# Patient Record
Sex: MALE | Race: WHITE | URBAN - METROPOLITAN AREA
[De-identification: names, ages, dates, MRNs, and addresses within clinical notes are randomized per-mention and may not be internally consistent; named-entity substitution may affect disease eponyms.]

---

## 2017-02-16 ENCOUNTER — TELEPHONE (OUTPATIENT)
Dept: DERMATOLOGY | Facility: CLINIC | Age: 47
End: 2017-02-16

## 2017-02-16 NOTE — TELEPHONE ENCOUNTER
History of a solid organ transplant:  no     Bone Marrow Transplant :  no    HIV or Hepatitis B or C :  no    Any Bleeding disorders:  no    Do you have a Pacemaker or Defibrillator: no    Do you wear a C Pap Mask: no  If yes and it is a face procedure ask Pt to bring mask only in with them    Do you have any mobility issues: no    Joint Replacement in the last 2 years:  no      Do you eder Prophylactic Antibiotics:  no  If yes patient will be instructed to take them one hour before they come to clinic    History of Skin cancer : yes    History of Mohs Surgery:  Yes      Smoking History:  Yes, 1/2 pack per day     Immunosuppresisve Medication:  no      Do you have any other health issues that we should know about?: no    Do you take ASA:  no IBU: as needed Vit. E.:  no Fish Oil:   noDo you take Plavix:  no  Warfarin:  no  Pradaxa:  No      Allergies to medication : NKDA    Are medications in Epic:  Yes     The patient was reminded that this can be an all day long appointment, to bring a  and his medications.

## 2017-02-27 ENCOUNTER — OFFICE VISIT (OUTPATIENT)
Dept: DERMATOLOGY | Facility: CLINIC | Age: 47
End: 2017-02-27

## 2017-02-27 VITALS — SYSTOLIC BLOOD PRESSURE: 120 MMHG | HEART RATE: 90 BPM | DIASTOLIC BLOOD PRESSURE: 76 MMHG

## 2017-02-27 DIAGNOSIS — C44.311 BASAL CELL CARCINOMA OF RIGHT SIDE OF NOSE: Primary | ICD-10-CM

## 2017-02-27 NOTE — MR AVS SNAPSHOT
After Visit Summary   2/27/2017    Valeriano Rendon    MRN: [id number]           Patient Information     Date Of Birth          1970        Visit Information        Provider Department      2/27/2017 8:30 AM Grace Mercado MD [facility] Dermatologic Surgery        Care Instructions    Sutured Wound Care  Caring for your skin after surgery:    After your surgery, a pressure bandage will be placed over the area that has stitches. This will prevent bleeding. Please follow these instructions over the next 1 to 2 weeks. Following this regimen will help to prevent complications as your wound heals.      No strenuous activity for 48 hours. Resume moderate activity in 48 hours. No heavy exercising until you are seen for follow up.    Take Tylenol one hour after surgery. Take Tylenol every 4 hours as needed and do not take any aspirin products for pain (continue taking aspirin if prescribed by your doctor to prevent heart attacks and strokes).    Do not drink alcoholic beverages for 48 hours.    No dietary restrictions.    Keep the pressure bandage in place for 24 hours. If the bandage becomes blood tinged or loose, reinforce it with gauze and tape.     Keep the bandage dry. Wash around it carefully    If the tape becomes soiled or starts to come off, reinforce it with additional paper tape.    Do not smoke for 3 weeks; smoking is detrimental to wound healing.    It is normal to have swelling and bruising around the surgical site. The bruising will fade in approximately 10-14 days. Elevate the area to reduce swelling.    Numbness, itchiness and sensitivity to temperature changes can occur after surgery and may take up to 18 months to normalize.  Remove the outer pressure bandage in 24 hours. Leave the flat bandage in place for 10 days or until your follow-up appointment if it is within those 10 days.     Bleeding:  You may see a small amount of drainage or blood on your pressure dressing. This  is normal and the following instructions should be followed if the wound is bleeding saturates the dressing.    1. Leave the bandage in place.  2. Use tightly rolled up gauze or a cloth to apply direct pressure over the bandage for 20 minutes.  3. Reapply pressure for an additional 20 minutes if necessary.  4. Call the office or go to the nearest emergency room if pressure fails to stop the bleeding.  5. Use additional gauze and tape to maintain pressure once the bleeding has stopped.    Pain:  1. Post operative pain should slowly get better, never worse.  2. A severe increase in pain may indicate a problem. Call the office if this occurs.  3. You may use ice directly over the dressing, but make sure the dressing does not get wet.    Phone numbers:  During business hours (M-F 8:00-4:30 p.m.)  [facility]-  [phone] Option 1 appt. desk  [phone]  Option 3 nurse triage line  ---------------------------------------------------------  Evenings/Weekends/Holidays  Hospital - [phone]   TTY for hearing gnybzzpb-[phone]  *Ask  to page dermatologist on-call  Emergency Qupu-[phone]  TTY for hearing impaired- [phone]          Follow-ups after your visit        Who to contact     Please call your clinic at [phone] to:    Ask questions about your health    Make or cancel appointments    Discuss your medicines    Learn about your test results    Speak to your doctor   If you have compliments or concerns about an experience at your clinic, or if you wish to file a complaint, please contact [facility] Patient Relations at [phone] or email us at [email or web address]         Additional Information About Your Visit        [employer] Information     [employer] is an electronic gateway that provides easy, online access to your medical records. With [employer], you can request a clinic appointment, read your test results, renew a  prescription or communicate with your care team.     To sign up for MyChart visit the website at [email or web address]   You will be asked to enter the access code listed below, as well as some personal information. Please follow the directions to create your username and password.     Your access code is: [email or web address]  Expires: 2017  6:31 AM     Your access code will  in 90 days. If you need help or a new code, please contact your [facility] or call [phone] for assistance.        Care EveryWhere ID     This is your Care EveryWhere ID. This could be used by other organizations to access your [facility] medical records  [id number]        Your Vitals Were     Pulse                   90            Blood Pressure from Last 3 Encounters:   17 120/76   16 106/72   16 116/72    Weight from Last 3 Encounters:   16 72.1 kg (159 lb)   08/10/16 72.2 kg (159 lb 3.2 oz)   16 72.2 kg (159 lb 3.2 oz)              Today, you had the following     No orders found for display       Primary Care Provider Office Phone # Fax #    Cosme Nando Parham [phone]       [facility] [address] [address]        Thank you!     Thank you for choosing [facility] DERMATOLOGIC SURGERY  for your care. Our goal is always to provide you with excellent care. Hearing back from our patients is one way we can continue to improve our services. Please take a few minutes to complete the written survey that you may receive in the mail after your visit with us. Thank you!             Your Updated Medication List - Protect others around you: Learn how to safely use, store and throw away your medicines at www.disposemymeds.org.      Notice  As of 2017 12:17 PM    You have not been prescribed any medications.

## 2017-02-27 NOTE — PATIENT INSTRUCTIONS
Sutured Wound Care  Caring for your skin after surgery:    After your surgery, a pressure bandage will be placed over the area that has stitches. This will prevent bleeding. Please follow these instructions over the next 1 to 2 weeks. Following this regimen will help to prevent complications as your wound heals.      No strenuous activity for 48 hours. Resume moderate activity in 48 hours. No heavy exercising until you are seen for follow up.    Take Tylenol one hour after surgery. Take Tylenol every 4 hours as needed and do not take any aspirin products for pain (continue taking aspirin if prescribed by your doctor to prevent heart attacks and strokes).    Do not drink alcoholic beverages for 48 hours.    No dietary restrictions.    Keep the pressure bandage in place for 24 hours. If the bandage becomes blood tinged or loose, reinforce it with gauze and tape.     Keep the bandage dry. Wash around it carefully    If the tape becomes soiled or starts to come off, reinforce it with additional paper tape.    Do not smoke for 3 weeks; smoking is detrimental to wound healing.    It is normal to have swelling and bruising around the surgical site. The bruising will fade in approximately 10-14 days. Elevate the area to reduce swelling.    Numbness, itchiness and sensitivity to temperature changes can occur after surgery and may take up to 18 months to normalize.  Remove the outer pressure bandage in 24 hours. Leave the flat bandage in place for 10 days or until your follow-up appointment if it is within those 10 days.     Bleeding:  You may see a small amount of drainage or blood on your pressure dressing. This is normal and the following instructions should be followed if the wound is bleeding saturates the dressing.    1. Leave the bandage in place.  2. Use tightly rolled up gauze or a cloth to apply direct pressure over the bandage for 20 minutes.  3. Reapply pressure for an additional 20 minutes if necessary.  4. Call  the office or go to the nearest emergency room if pressure fails to stop the bleeding.  5. Use additional gauze and tape to maintain pressure once the bleeding has stopped.    Pain:  1. Post operative pain should slowly get better, never worse.  2. A severe increase in pain may indicate a problem. Call the office if this occurs.  3. You may use ice directly over the dressing, but make sure the dressing does not get wet.    Phone numbers:  During business hours (M-F 8:00-4:30 p.m.)  [facility]-  [phone] Option 1 appt. desk  [phone]  Option 3 nurse triage line  ---------------------------------------------------------  Evenings/Weekends/Holidays  Hospital - [phone]   TTY for hearing haxgkswu-[phone]  *Ask  to page dermatologist on-call  Emergency Tjnj-[phone]  TTY for hearing impaired- [phone]

## 2017-02-27 NOTE — NURSING NOTE
Closure performed on the right sulcus. Injected 9.0ml of Xylocaine  (Lidocaine 1% and Epinephrine  (1:100,000)      Present for procedure: Dr. Mercado and Karoline Calzada, CMA

## 2017-02-27 NOTE — LETTER
2/27/2017       RE: Valeriano Rendon  [address] [facility] [address]     Dear Colleague,    Thank you for referring your patient, Valeriano Rendon, to the [facility] DERMATOLOGIC SURGERY at [facility]. Please see a copy of my visit note below.    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 Mohs surgery was indicated. After appropriate discussion and informed consent for Mohs surgery and possible repair of the Mohs surgery defect the patient underwent Mohs surgery using the fresh tissue technique as follows:    STAGE I:  The patient was placed on the operating room table.  The area was infiltrated with 1% Lidocaine and epinephrine. Using a #15-blade, complete excision was made around the tumor in 1 section.  Hemostasis was obtained by electrodesiccation.  A dressing was placed.  Tissue was divided into 1 tissue block that was subsequently mapped, color coded and processed in the Mohs Laboratory.  Microscopic tumor  was not found in the tissue block.    With the lesion clear of micrographic tumor, surgery was considered complete.  The defect extended to the subcutis and measured 0.8 cm x 0.8 cm.      RECONSTRUCTIVE DERMATOLOGIC SURGERY REPORT  We discussed the options for wound management in full with the patient including risks/benefits/possible outcomes.     Crescentic Advancement Flap  Because of the full-thickness nature of the defect, tightness of surrounding skin, and proximity to the nasal ala a crescentic advancement flap was carefully planned. Patient was prepped with  Betasept,  local anesthesia administered, and the site draped in a sterile fashion. The Mohs defect/wound was circumferentially debeveled. A Burow's triangle was then excised superior to the defect.  A tangential and curvilinear incision was made laterally onto the cheek paralleling the alar crease and nasolabial fold. Here a crescentic triangle was excised.  The laterally-based flap was then raised by dissection in the deep subcutaneous plane and the remainder of the wound edges were also undermined.  After hemostasis, the flap was advanced into the defect with periosteal sutures placed to sink the flap into the nasofacial sulcus. The flap was then sutured together in a layered fashion.   Postoperative size was 8.4 cm2.  Estimated blood loss, minimal; complications, none; bandaging and wound care, routine. Patient was discharged in good condition and will return for bandage change in 1 week.    Grace Mercado MD  , Department of Dermatology  Director, [facility]  Phone: [phone]; Fax: [phone]    [facility]   [address] [place] [address]   Mail Code [id number]   [place] [address]          [facility] Mohs Micrographic Surgery Note:  Feb 27, 2017  Valeriano Rendon is a 46 year old male who returns for Mohs excision of a BCC, nodular with fibrosis located on the right nasofacial sulcus. The lesion was excised using Mohs micrographic surgery in 1 stage(s), and the defect repaired by crescentic advancement flap.Please refer to the operative report(s).     Smoking cessation discussed.     SCAR FROM PRIOR MOHS, Left nasal sidewall  Additionally, scar from left nasofacial sulcus addressed. No spitting sutures were discovered on nicking of skin. Some comedones were expressed. Scar is firm. Discussed that resulting scar appearance is due to combination of factors, most notably smoking cigarettes, as well as  small post-op hematoma.       The patient was asked to return in 1 week(s) for bandage change/wound check.     Follow up with referring physician/provider for history of skin cancer.    Thank you for the opportunity to see and treat your patient. Please do not hesitate to contact me with any questions or concerns regarding the findings and/or operation.    Grace Mercado MD  , Department of Dermatology  Director, [facility]  Phone: [phone]; Fax: [phone]  [email or web address] Dermatologic Surgery [facility]   [address] [place] [address]   Mail Code [id number]   [place] [address]

## 2017-02-27 NOTE — PROGRESS NOTES
[facility] Mohs Micrographic Surgery Note:  Feb 27, 2017  Valeriano Rendon is a 46 year old male who returns for Mohs excision of a BCC, nodular with fibrosis located on the right nasofacial sulcus. The lesion was excised using Mohs micrographic surgery in 1 stage(s), and the defect repaired by crescentic advancement flap.Please refer to the operative report(s).     Smoking cessation discussed.     SCAR FROM PRIOR MOHS, Left nasal sidewall  Additionally, scar from left nasofacial sulcus addressed. No spitting sutures were discovered on nicking of skin. Some comedones were expressed. Scar is firm. Discussed that resulting scar appearance is due to combination of factors, most notably smoking cigarettes, as well as small post-op hematoma.       The patient was asked to return in 1 week(s) for bandage change/wound check.     Follow up with referring physician/provider for history of skin cancer.    Thank you for the opportunity to see and treat your patient. Please do not hesitate to contact me with any questions or concerns regarding the findings and/or operation.    Grace Mercado MD  , Department of Dermatology  Director, D[facility]  Phone: [phone]; Fax: [phone]  [email or web address]   [address] [place] [address]   Mail Code [id number] [address]

## 2017-02-27 NOTE — NURSING NOTE
1st layer performed on the right sulcus. Injected 3.0ml of Xylocaine  (Lidocaine 1% and Epinephrine  (1:100,000)      Present for procedure: Dr. Mercado and Karoline Calzada, CMA

## 2017-02-27 NOTE — NURSING NOTE
Dermatology Rooming Note    Valeriano Rendon's goals for this visit include:   Chief Complaint   Patient presents with     Skin Cancer     MOHS x1 left sulcus     Karoline Calzada, ALFONSO

## 2017-02-28 NOTE — PROGRESS NOTES
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 Mohs surgery was indicated. After appropriate discussion and informed consent for Mohs surgery and possible repair of the Mohs surgery defect the patient underwent Mohs surgery using the fresh tissue technique as follows:    STAGE I:  The patient was placed on the operating room table.  The area was infiltrated with 1% Lidocaine and epinephrine. Using a #15-blade, complete excision was made around the tumor in 1 section.  Hemostasis was obtained by electrodesiccation.  A dressing was placed.  Tissue was divided into 1 tissue block that was subsequently mapped, color coded and processed in the Mohs Laboratory.  Microscopic tumor  was not found in the tissue block.    With the lesion clear of micrographic tumor, surgery was considered complete.  The defect extended to the subcutis and measured 0.8 cm x 0.8 cm.      RECONSTRUCTIVE DERMATOLOGIC SURGERY REPORT  We discussed the options for wound management in full with the patient including risks/benefits/possible outcomes.     Crescentic Advancement Flap  Because of the full-thickness nature of the defect, tightness of surrounding skin, and proximity to the nasal ala a crescentic advancement flap was carefully planned. Patient was prepped with Betasept,  local anesthesia administered, and the site draped in a sterile fashion. The Mohs defect/wound was circumferentially debeveled. A Burow's triangle was then excised superior to the defect.  A tangential and curvilinear incision was made laterally onto the cheek paralleling the alar crease and  nasolabial fold. Here a crescentic triangle was excised.  The laterally-based flap was then raised by dissection in the deep subcutaneous plane and the remainder of the wound edges were also undermined.  After hemostasis, the flap was advanced into the defect with periosteal sutures placed to sink the flap into the nasofacial sulcus. The flap was then sutured together in a layered fashion.   Postoperative size was 8.4 cm2.  Estimated blood loss, minimal; complications, none; bandaging and wound care, routine. Patient was discharged in good condition and will return for bandage change in 1 week.    Grace Mercado MD  , Department of Dermatology  Director, [facility]  Phone: [phone]; Fax: [phone]    [facility]   [address] [place] [address]   Mail Code [id number]   [place] [address]

## 2017-03-03 ENCOUNTER — TELEPHONE (OUTPATIENT)
Dept: DERMATOLOGY | Facility: CLINIC | Age: 47
End: 2017-03-03

## 2017-03-03 NOTE — TELEPHONE ENCOUNTER
Spoke with Valeriano and discussed wound care instructions. He notes this area is not actively bleeding. He states that gauze and tape are not sticking to the area. I explained that Valeriano should remove the initial bandage (not the steri strips) and clean with water, pat dry and apply gauze and tape. Valeriano questioned if the steri strips came loose under his bandage, what would his next steps be? He informed me that he was sent home with steri strips and that his girlfriend is an RN. I explained that she could apply steri strips and we can recheck this wound on Wednesday. Valeriano was happy with the call back.

## 2017-03-03 NOTE — TELEPHONE ENCOUNTER
----- Message from Suni Elise sent at 3/3/2017  4:00 PM CST -----  Regarding: URGENT:  Pt had MOHS surgery on Monday with Dr. Mercado...  Contact: [phone]  He said that his dressing is so saturated that he cannot keep it on.  Can he come in yet today and get it clean up and changed?    Please call Pt at ph# [phone].    Thank you,    Natan    Please DO NOT send this message and/or reply back to sender.  Call Center Representatives DO NOT respond to messages.

## 2017-03-06 ENCOUNTER — ALLIED HEALTH/NURSE VISIT (OUTPATIENT)
Dept: DERMATOLOGY | Facility: CLINIC | Age: 47
End: 2017-03-06

## 2017-03-06 DIAGNOSIS — T81.40XA POSTOPERATIVE INFECTION, INITIAL ENCOUNTER: Primary | ICD-10-CM

## 2017-03-06 RX ORDER — CEPHALEXIN 500 MG/1
500 CAPSULE ORAL 3 TIMES DAILY
Qty: 30 CAPSULE | Refills: 0 | Status: SHIPPED | OUTPATIENT
Start: 2017-03-06 | End: 2017-05-18

## 2017-03-06 NOTE — MR AVS SNAPSHOT
After Visit Summary   3/6/2017    Valeriano Rendon    MRN: [id number]           Patient Information     Date Of Birth          1970        Visit Information        Provider Department      3/6/2017 10:30 AM Nurse, Caroline Dowling [facility] Dermatologic Surgery        Today's Diagnoses     Postoperative infection, initial encounter    -  1      Care Instructions    Wound care instructions for  ONE WEEK AFTER SURGERY      1. If the wound is open or bleeding in any part of the incision/graft site, you should cover the area with a bandage daily as directed below:    1. Clean and dry area with plain water using a Q-tip or sterile gauze pad.  2. Apply vaseline, aquaphor, polysporin, or bacitracin ointment to the wound  3. Cover the wound with a band-aid or a sterile non-stick gauze pad and micropore paper tape.      Repeat the instructions above until wound is completely healed    If you notice that the scar is red and firm (after you remove the dressing) this is normal and will fade over time. It may take up to 6-12 months for this to occur.    Massaging the area helps the scar fade and soften quicker. Begin to massage the area one month after the dressings have been removed. Apply pressure directly and firmly over the scar with the fingertips and move in circular motions. You may massage up to 10 times daily, each time for 30 seconds.    It is normal for there to be a tender, pimple-like bump along the scar about 6-8 weeks after surgery. This sometimes happens as the scar matures and the stitches beneath begin to dissolve. Do not pick or squeeze. It will resolve in time. If an area of the wound does open and there appears to be drainage, you may apply one of the ointments listed in the above directions a few times every day until the wound is completely healed.    Numbness around the surgical site is normal. It can take up to 12-18 months for the feeling to return to normal. Itchiness, tingling,  and occasional sharp pains might be present during this portion of the healing. All of these feelings will subside once the nerves have completely healed.      Phone numbers:  During business hours (M-F 8:00-4:30 p.m.)  [facility]-  [phone] Option 1 appt. desk  [phone]  Option 3 nurse triage line  ---------------------------------------------------------  Evenings/Weekends/Holidays  Hospital - [phone]   TTY for hearing [email or web address]-[phone]  *Ask  to page dermatologist on-call  Emergency Tisr-[phone]  TTY for hearing impaired- [phone]            Follow-ups after your visit        Who to contact     Please call your clinic at [phone] to:    Ask questions about your health    Make or cancel appointments    Discuss your medicines    Learn about your test results    Speak to your doctor   If you have compliments or concerns about an experience at your clinic, or if you wish to file a complaint, please contact [facility] Patient Relations at [phone] or email us at [email or web address]         Additional Information About Your Visit        [employer] Information     [employer] is an electronic gateway that provides easy, online access to your medical records. With [employer], you can request a clinic appointment, read your test results, renew a prescription or communicate with your care team.     To sign up for [employer] visit the website at [email or web address]   You will be asked to enter the access code listed below, as well as some personal information. Please follow the directions to create your username and password.     Your access code is: [email or web address]  Expires: 2017  6:31 AM     Your access code will  in 90 days. If you need help or a new code, please contact your [facility] or call [phone] for assistance.        Care EveryWhere ID     This is your Care  EveryWhere ID. This could be used by other organizations to access your [place] medical records  [id number]         Blood Pressure from Last 3 Encounters:   02/27/17 120/76   11/28/16 106/72   08/24/16 116/72    Weight from Last 3 Encounters:   08/24/16 72.1 kg (159 lb)   08/10/16 72.2 kg (159 lb 3.2 oz)   05/09/16 72.2 kg (159 lb 3.2 oz)              We Performed the Following     Skin Culture Aerobic Bacterial          Today's Medication Changes          These changes are accurate as of: 3/6/17 11:11 AM.  If you have any questions, ask your nurse or doctor.               Start taking these medicines.        Dose/Directions    cephALEXin 500 MG capsule   Commonly known as:  KEFLEX   Used for:  Postoperative infection, initial encounter        Dose:  500 mg   Take 1 capsule (500 mg) by mouth 3 times daily   Quantity:  30 capsule   Refills:  0            Where to get your medicines      These medications were sent to [address] [place] [address] [address] [place] [address]    Hours:  TRANSPLANT PHONE NUMBER [phone] Phone:  [phone]     cephALEXin 500 MG capsule                Primary Care Provider Office Phone # Fax #    Cosme Nando Parham [phone]       [facility] [address] [facility] [address]  [facility] [address]        Thank you!     Thank you for choosing [facility] DERMATOLOGIC SURGERY  for your care. Our goal is always to provide you with excellent care. Hearing back from our patients is one way we can continue to improve our services. Please take a few minutes to complete the written survey that you may receive in the mail after your visit with us. Thank you!             Your Updated Medication List - Protect others around you: Learn how to safely use, store and throw away your medicines at www.disposemymeds.org.          This list is accurate as of: 3/6/17 11:11 AM.  Always use your most recent  med list.                   Brand Name Dispense Instructions for use    cephALEXin 500 MG capsule    KEFLEX    30 capsule    Take 1 capsule (500 mg) by mouth 3 times daily

## 2017-03-06 NOTE — PATIENT INSTRUCTIONS
Wound care instructions for  ONE WEEK AFTER SURGERY      1. If the wound is open or bleeding in any part of the incision/graft site, you should cover the area with a bandage daily as directed below:    1. Clean and dry area with plain water using a Q-tip or sterile gauze pad.  2. Apply vaseline, aquaphor, polysporin, or bacitracin ointment to the wound  3. Cover the wound with a band-aid or a sterile non-stick gauze pad and micropore paper tape.      Repeat the instructions above until wound is completely healed    If you notice that the scar is red and firm (after you remove the dressing) this is normal and will fade over time. It may take up to 6-12 months for this to occur.    Massaging the area helps the scar fade and soften quicker. Begin to massage the area one month after the dressings have been removed. Apply pressure directly and firmly over the scar with the fingertips and move in circular motions. You may massage up to 10 times daily, each time for 30 seconds.    It is normal for there to be a tender, pimple-like bump along the scar about 6-8 weeks after surgery. This sometimes happens as the scar matures and the stitches beneath begin to dissolve. Do not pick or squeeze. It will resolve in time. If an area of the wound does open and there appears to be drainage, you may apply one of the ointments listed in the above directions a few times every day until the wound is completely healed.    Numbness around the surgical site is normal. It can take up to 12-18 months for the feeling to return to normal. Itchiness, tingling, and occasional sharp pains might be present during this portion of the healing. All of these feelings will subside once the nerves have completely healed.      Phone numbers:  During business hours (M-F 8:00-4:30 p.m.)  [facility]-  [phone] Option 1 appt. desk  [phone]  Option 3 nurse triage  line  ---------------------------------------------------------  Evenings/Weekends/Holidays  Hospital - [phone]   TTY for hearing ukxbovsa-[phone]  *Ask  to page dermatologist on-call  Emergency Jbzj-[phone]  TTY for hearing impaired- [phone]

## 2017-03-06 NOTE — NURSING NOTE
"Chief Complaint   Patient presents with     Follow Up For     Mohs F/U one week BCC right side of nose      Shea Sidhu RN       Erythema,swelling, purulent drainage noted. Md Mercado aware. Abx Rx given. Aerobic bacterial culture taken of site. Denies pain at this time. Patient continues to smoke. He reports \"cutting down 50%:. Granulating wound care instructions given. All questions answered.     Shea Sidhu RN     "

## 2017-03-08 LAB
BACTERIA SPEC CULT: NORMAL
Lab: NORMAL
MICRO REPORT STATUS: NORMAL
SPECIMEN SOURCE: NORMAL

## 2017-03-09 ENCOUNTER — TELEPHONE (OUTPATIENT)
Dept: DERMATOLOGY | Facility: CLINIC | Age: 47
End: 2017-03-09

## 2017-03-09 NOTE — TELEPHONE ENCOUNTER
"I called the patient and he was given the following message per Dr. Mercado: \"OK to continue antibiotics - can help decrease inflammation/redness. As long as he is not experiencing side effects, best to complete a course once it has been started.     Thanks!\"  The patient verbalized that he understood.   Karoline Calzada [facility]     "

## 2017-05-18 ENCOUNTER — RADIANT APPOINTMENT (OUTPATIENT)
Dept: GENERAL RADIOLOGY | Facility: CLINIC | Age: 47
End: 2017-05-18
Attending: PHYSICIAN ASSISTANT
Payer: OTHER MISCELLANEOUS

## 2017-05-18 ENCOUNTER — TELEPHONE (OUTPATIENT)
Dept: FAMILY MEDICINE | Facility: CLINIC | Age: 47
End: 2017-05-18

## 2017-05-18 ENCOUNTER — OFFICE VISIT (OUTPATIENT)
Dept: URGENT CARE | Facility: URGENT CARE | Age: 47
End: 2017-05-18
Payer: OTHER MISCELLANEOUS

## 2017-05-18 VITALS
WEIGHT: 163 LBS | HEART RATE: 82 BPM | OXYGEN SATURATION: 97 % | DIASTOLIC BLOOD PRESSURE: 84 MMHG | SYSTOLIC BLOOD PRESSURE: 118 MMHG | BODY MASS INDEX: 27.12 KG/M2 | TEMPERATURE: 98 F

## 2017-05-18 DIAGNOSIS — M54.50 ACUTE BILATERAL LOW BACK PAIN WITHOUT SCIATICA: Primary | ICD-10-CM

## 2017-05-18 DIAGNOSIS — M62.830 BACK MUSCLE SPASM: ICD-10-CM

## 2017-05-18 DIAGNOSIS — M54.50 ACUTE BILATERAL LOW BACK PAIN WITHOUT SCIATICA: ICD-10-CM

## 2017-05-18 DIAGNOSIS — S39.92XA BACK INJURY, INITIAL ENCOUNTER: Primary | ICD-10-CM

## 2017-05-18 PROCEDURE — 72100 X-RAY EXAM L-S SPINE 2/3 VWS: CPT

## 2017-05-18 PROCEDURE — 99214 OFFICE O/P EST MOD 30 MIN: CPT | Performed by: PHYSICIAN ASSISTANT

## 2017-05-18 RX ORDER — IBUPROFEN 800 MG/1
800 TABLET, FILM COATED ORAL EVERY 8 HOURS PRN
Qty: 30 TABLET | Refills: 1 | Status: SHIPPED | OUTPATIENT
Start: 2017-05-18

## 2017-05-18 RX ORDER — METHOCARBAMOL 500 MG/1
1000 TABLET, FILM COATED ORAL 3 TIMES DAILY PRN
Qty: 30 TABLET | Refills: 1 | Status: SHIPPED | OUTPATIENT
Start: 2017-05-18

## 2017-05-18 ASSESSMENT — PAIN SCALES - GENERAL: PAINLEVEL: SEVERE PAIN (6)

## 2017-05-18 NOTE — LETTER
[address] [address]  [phone]        May 18, 2017    REPORT OF WORK ABILITY    PATIENT DATA  Employee Name: Valeriano Rendon        : 1970   [id number]  Work related injury: YES  Today's date: May 18, 2017  Date of injury: 2017     PROVIDER EVALUATION: Please fill in as needed.  Please give copy to employee for employer.  1. Diagnosis: lower back pain, spasms  2. Treatment: ice, rest, medication  3. Medication: robaxin, motrin  NOTE: When ordering a medication, [place] Rules require Work Comp or WC on prescriptions.  4. Return to work date: 17      RESTRICTIONS:  No lifting below the knee  No lifting over 20 lbs  No working at heights  No driving if taking muscle relaxant  Pushing and pulling limitations over 50 lbs    Provider comments:   Medical Examiner: Jeff Grier  [facility] PAC      Next appointment: As needed   Work comp restrictions  17.    CC: Employer, Managed Care Plan/Payor, Patient

## 2017-05-18 NOTE — MR AVS SNAPSHOT
"              After Visit Summary   5/18/2017    Valeriano Rendon    MRN: [id number]           Patient Information     Date Of Birth          1970        Visit Information        Provider Department      5/18/2017 2:05 PM Jeff Grier PA-C [facility]        Today's Diagnoses     Acute bilateral low back pain without sciatica    -  1    Back muscle spasm           Follow-ups after your visit        Your next 10 appointments already scheduled     May 22, 2017  2:00 PM CDT   Office Visit with Cosme Parham MD   [facility] ([facility])    [address] [place] [address] [phone]           Bring a current list of meds and any records pertaining to this visit.  For Physicals, please bring immunization records and any forms needing to be filled out.  Please arrive 10 minutes early to complete paperwork.              Who to contact     If you have questions or need follow up information about today's clinic visit or your schedule please contact [facility] directly at [phone].  Normal or non-critical lab and imaging results will be communicated to you by [employer], letter or phone within 4 business days after the clinic has received the results. If you do not hear from us within 7 days, please contact the clinic through FAD ? IOt or phone. If you have a critical or abnormal lab result, we will notify you by phone as soon as possible.  Submit refill requests through [email or web address] or call your pharmacy and they will forward the refill request to us. Please allow 3 business days for your refill to be completed.          Additional Information About Your Visit        MyChart Information     [email or web address] lets you send messages to your doctor, view your test results, renew your prescriptions, schedule appointments and more. To sign up, go to [email or web address] . Click on \"Log in\" on the " "left side of the screen, which will take you to the Welcome page. Then click on \"Sign up Now\" on the right side of the page.     You will be asked to enter the access code listed below, as well as some personal information. Please follow the directions to create your username and password.     Your access code is: [email or web address]  Expires: 2017 12:39 PM     Your access code will  in 90 days. If you need help or a new code, please call your [facility] or [phone].        Care EveryWhere ID     This is your Care EveryWhere ID. This could be used by other organizations to access your [place] medical records  [id number]        Your Vitals Were     Pulse Temperature Pulse Oximetry BMI (Body Mass Index)          82 98  F (36.7  C) (Oral) 97% 27.12 kg/m2         Blood Pressure from Last 3 Encounters:   17 118/84   17 120/76   16 106/72    Weight from Last 3 Encounters:   17 163 lb (73.9 kg)   16 159 lb (72.1 kg)   08/10/16 159 lb 3.2 oz (72.2 kg)                 Today's Medication Changes          These changes are accurate as of: 17 11:59 PM.  If you have any questions, ask your nurse or doctor.               Start taking these medicines.        Dose/Directions    ibuprofen 800 MG tablet   Commonly known as:  ADVIL/MOTRIN   Used for:  Acute bilateral low back pain without sciatica   Started by:  Jeff Grier PA-C        Dose:  800 mg   Take 1 tablet (800 mg) by mouth every 8 hours as needed for moderate pain   Quantity:  30 tablet   Refills:  1       methocarbamol 500 MG tablet   Commonly known as:  ROBAXIN   Used for:  Back muscle spasm   Started by:  Jeff Grier PA-C        Dose:  1000 mg   Take 2 tablets (1,000 mg) by mouth 3 times daily as needed for muscle spasms   Quantity:  30 tablet   Refills:  1            Where to get your medicines      Some of these will need a paper prescription and others can be bought over the counter.  Ask your nurse if you " have questions.     Bring a paper prescription for each of these medications     ibuprofen 800 MG tablet    methocarbamol 500 MG tablet                Primary Care Provider Office Phone # Fax #    Cosme Parham [phone]       [facility] [address] [facility] [address]  [facility] [address]        Thank you!     Thank you for choosing [facility]  for your care. Our goal is always to provide you with excellent care. Hearing back from our patients is one way we can continue to improve our services. Please take a few minutes to complete the written survey that you may receive in the mail after your visit with us. Thank you!             Your Updated Medication List - Protect others around you: Learn how to safely use, store and throw away your medicines at www.disposemymeds.org.          This list is accurate as of: 5/18/17 11:59 PM.  Always use your most recent med list.                   Brand Name Dispense Instructions for use    ibuprofen 800 MG tablet    ADVIL/MOTRIN    30 tablet    Take 1 tablet (800 mg) by mouth every 8 hours as needed for moderate pain       methocarbamol 500 MG tablet    ROBAXIN    30 tablet    Take 2 tablets (1,000 mg) by mouth 3 times daily as needed for muscle spasms

## 2017-05-18 NOTE — TELEPHONE ENCOUNTER
Pt is requesting a referral for a back specialist due to a workman's comp issue    Pt can be reached @ [phone] penny

## 2017-05-18 NOTE — NURSING NOTE
"Chief Complaint   Patient presents with     Work Comp     back and shoulder injury. DOI: 05/17/2017 at 11:30p. Patient was lifting a door on a truck        Initial /84 (BP Location: Right arm, Patient Position: Chair, Cuff Size: Adult Regular)  Pulse 82  Temp 98  F (36.7  C) (Oral)  Wt 163 lb (73.9 kg)  SpO2 97%  BMI 27.12 kg/m2 Estimated body mass index is 27.12 kg/(m^2) as calculated from the following:    Height as of 8/24/16: 5' 5\" (1.651 m).    Weight as of this encounter: 163 lb (73.9 kg).  Medication Reconciliation: complete  "

## 2017-05-18 NOTE — TELEPHONE ENCOUNTER
Call to patient. Informed him of provider message below.     He has scheduled an appointment with Dr. Parham on 5/22/17 at 1400.     Chelo Price RN  [facility]

## 2017-05-18 NOTE — TELEPHONE ENCOUNTER
Dr. 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     He has not taken anything for pain, reports that it is tolerable but causing discomfort. Asked patient to schedule an appointment, he requests a message be sent to MD.    He is requesting a referral to a back specialist    Triage: please call patient back on his cell number [phone]    Chelo Price RN  [facility]

## 2017-05-18 NOTE — TELEPHONE ENCOUNTER
I can refer him to an orthopedic/ spine clinic BUT he needs to first notify with WORKMANS COMP person and find out what their requirements are

## 2017-05-19 NOTE — PROGRESS NOTES
SUBJECTIVE  HPI: Valeriano Rendon is a 46 year old male who presents for evaluation of back pain lower and mild upper back  Symptoms began 1 day(s) ago, have been onset gradual and are worse.  Pain is located in the low back bilateral, middle of back bilateral and upper back bilateral region, with radiation to does not radiate, and are at worst a 5 on a scale of 1-10.  Recent injury:recent injury at work  , recent heavy lifting and turning/bending   Personal hx of back pain is recurrent self limited episodes of low back pain in the past.  Pain is exacerbated by: bending, changing position and lifting.  Pain is relieved by: none   Associated sx include: spasms.  Red flag symptoms: negative, fever, chills, night sweats.    PMH:  None    Allergies   Allergen Reactions     Vicodin [Hydrocodone-Acetaminophen] GI Disturbance     Social History   Substance Use Topics     Smoking status: Current Every Day Smoker     Smokeless tobacco: Never Used     Alcohol use No       ROS:  CONSTITUTIONAL:NEGATIVE for fever, chills, change in weight  INTEGUMENTARY/SKIN: NEGATIVE for worrisome rashes, moles or lesions  ENT/MOUTH: NEGATIVE for ear, mouth and throat problems  RESP:NEGATIVE for significant cough or SOB  CV: NEGATIVE for chest pain, palpitations or peripheral edema  GI: NEGATIVE for nausea, abdominal pain, heartburn, or change in bowel habits  MUSCULOSKELETAL: POSITIVE  for lower back, mid back and upper back pain with spasms  NEURO: NEGATIVE for weakness, dizziness or paresthesias    OBJECTIVE:  /84 (BP Location: Right arm, Patient Position: Chair, Cuff Size: Adult Regular)  Pulse 82  Temp 98  F (36.7  C) (Oral)  Wt 163 lb (73.9 kg)  SpO2 97%  BMI 27.12 kg/m2  Back examination: positive findings: paraspinal muscle spasm, tenderness to palpation lower lumbar region, mid and upper back laterally  STRAIGHT leg raise test: negative  GENERAL APPEARANCE: healthy, alert and no distress  RESP: lungs clear to  auscultation - no rales, rhonchi or wheezes  CV: regular rates and rhythm, normal S1 S2, no murmur noted  ABDOMEN:  soft, nontender, no HSM or masses and bowel sounds normal  NEURO: Normal strength and tone with no weakness or sensory deficit noted, reflexes normal   Extremities: no peripheral edema or tenderness, peripheral pulses normal  SKIN: no suspicious lesions or rashes    Lumbar xray Negative for acute findings, read by Jeff ESPINOSA at time of visit.    ASSESSMENT/IMPRESSION/PLAN:      ICD-10-CM    1. Acute bilateral low back pain without sciatica M54.5 XR Lumbar Spine 2/3 Views     ibuprofen (ADVIL/MOTRIN) 800 MG tablet   2. Back muscle spasm M62.830 methocarbamol (ROBAXIN) 500 MG tablet     EDUCATION      1.  Continue stretching and strengthening exercises.       2.  Continue prn heat or ice application.      Alternate ice and warm moist compresses  Follow up as needed

## 2017-05-22 ENCOUNTER — OFFICE VISIT (OUTPATIENT)
Dept: FAMILY MEDICINE | Facility: CLINIC | Age: 47
End: 2017-05-22
Payer: OTHER MISCELLANEOUS

## 2017-05-22 VITALS
SYSTOLIC BLOOD PRESSURE: 120 MMHG | DIASTOLIC BLOOD PRESSURE: 74 MMHG | HEART RATE: 85 BPM | WEIGHT: 163.1 LBS | HEIGHT: 65 IN | OXYGEN SATURATION: 100 % | BODY MASS INDEX: 27.17 KG/M2 | TEMPERATURE: 97.3 F

## 2017-05-22 DIAGNOSIS — M54.50 BILATERAL LOW BACK PAIN WITHOUT SCIATICA, UNSPECIFIED CHRONICITY: Primary | ICD-10-CM

## 2017-05-22 DIAGNOSIS — F34.1 DYSTHYMIC DISORDER: ICD-10-CM

## 2017-05-22 PROCEDURE — 99213 OFFICE O/P EST LOW 20 MIN: CPT | Performed by: FAMILY MEDICINE

## 2017-05-22 NOTE — PROGRESS NOTES
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   Symptoms have been improving since that time. Prior history of back problems: recurrent self limited episodes of low back pain melissa the right  in the past. There is no numbness in the legs.    OBJECTIVE:  Vital signs as noted above. Patient appears to be in mild   pain, normal  gait noted. Lumbosacral spine area reveals mild lower LS local tenderness but no  Painful but normal  LS ROM noted. Straight leg raise is negative  on both sides. DTR's, motor strength and sensation normal, including heel and toe gait.  Peripheral pulses are palpable.     ASSESSMENT:   lumbar strain , mostly resolved on the left with still some slight tendernes son the right     PLAN:  For acute pain, rest, intermittent application of cold packs (later, may switch to heat, but do not sleep on heating pad), analgesics and muscle relaxants are recommended. Discussed longer term treatment plan of prn NSAID's and discussed a home back care exercise program with flexion exercise routine. Proper lifting with avoidance of heavy lifting discussed. Will try r Physical Therapy and " consider XRay studies if not improving. Call or return to clinic prn if these symptoms worsen or fail to improve as anticipated.    Orders Placed This Encounter     [facility]     Referral Type:   [facility]

## 2017-05-22 NOTE — PROGRESS NOTES
"Chief Complaint   Patient presents with     Back Pain       Initial /74  Pulse 85  Temp 97.3  F (36.3  C) (Oral)  Ht 5' 5\" (1.651 m)  Wt 163 lb 1.6 oz (74 kg)  SpO2 100%  BMI 27.14 kg/m2 Estimated body mass index is 27.14 kg/(m^2) as calculated from the following:    Height as of this encounter: 5' 5\" (1.651 m).    Weight as of this encounter: 163 lb 1.6 oz (74 kg).  Medication Reconciliation: complete     Jessi Jameson MA    \  "

## 2017-05-22 NOTE — MR AVS SNAPSHOT
After Visit Summary   5/22/2017    Valeriano Rendon    MRN: [id number]           Patient Information     Date Of Birth          1970        Visit Information        Provider Department      5/22/2017 2:00 PM [place]ham [place] [facility] [place]        Today's Diagnoses     Bilateral low back pain without sciatica, unspecified chronicity    -  1       Follow-ups after your visit        Additional Services     CONNIE PT, HAND, AND CHIROPRACTIC REFERRAL       **This order will print in the [facility] Scheduling Office**    Physical Therapy, Hand Therapy and Chiropractic Care are available through:    *[facility]  *[facility]  *[facility]    Call one number to schedule at any of the above locations: [phone].    Your provider has referred you to: Chiropractic at [facility] or [facility]    Indication/Reason for Referral: Bilateral low back pain without sciatica, unspecified chronicity  (primary encounter diagnosis)   Onset of Illness: 5/17/2017  Therapy Orders: Evaluate and Treat  Special Programs: None  Special Request: None    Alis Snell      Additional Comments for the Therapist or Chiropractor:     Please be aware that coverage of these services is subject to the terms and limitations of your health insurance plan.  Call member services at your health plan with any benefit or coverage questions.      Please bring the following to your appointment:    *Your personal calendar for scheduling future appointments  *Comfortable clothing                  Who to contact     If you have questions or need follow up information about today's clinic visit or your schedule please contact [facility] directly at [phone].  Normal or non-critical lab and imaging results will be communicated to you by MyChart, letter or phone within 4 business days after the clinic has received the results. If you do not hear from us within 7  "days, please contact the clinic through SightCine or phone. If you have a critical or abnormal lab result, we will notify you by phone as soon as possible.  Submit refill requests through SightCine or call your pharmacy and they will forward the refill request to us. Please allow 3 business days for your refill to be completed.          Additional Information About Your Visit        SightCine Information     SightCine lets you send messages to your doctor, view your test results, renew your prescriptions, schedule appointments and more. To sign up, go to [email or web address] . Click on \"Log in\" on the left side of the screen, which will take you to the Welcome page. Then click on \"Sign up Now\" on the right side of the page.     You will be asked to enter the access code listed below, as well as some personal information. Please follow the directions to create your username and password.     Your access code is: [email or web address]  Expires: 2017 12:39 PM     Your access code will  in 90 days. If you need help or a new code, please call your [place] clinic or [phone].        Care EveryWhere ID     This is your Care EveryWhere ID. This could be used by other organizations to access your [place] medical records  [id number]        Your Vitals Were     Pulse Temperature Height Pulse Oximetry BMI (Body Mass Index)       85 97.3  F (36.3  C) (Oral) 5' 5\" (1.651 m) 100% 27.14 kg/m2        Blood Pressure from Last 3 Encounters:   17 120/74   17 118/84   17 120/76    Weight from Last 3 Encounters:   17 163 lb 1.6 oz (74 kg)   17 163 lb (73.9 kg)   16 159 lb (72.1 kg)              We Performed the Following     CONNIE PT, HAND, AND CHIROPRACTIC REFERRAL        Primary Care Provider Office Phone # Fax #    Cosme Parham [phone]       [facility] [address] [facility] [address]  [facility] [address]        Thank you!     Thank you for choosing [facility] [place]  for your care. Our goal is always to provide you with excellent care. Hearing back from our patients is one way we can continue to improve our services. Please take a few minutes to complete the written survey that you may receive in the mail after your visit with us. Thank you!             Your Updated Medication List - Protect others around you: Learn how to safely use, store and throw away your medicines at www.disposemymeds.org.          This list is accurate as of: 5/22/17  2:25 PM.  Always use your most recent med list.                   Brand Name Dispense Instructions for use    ibuprofen 800 MG tablet    ADVIL/MOTRIN    30 tablet    Take 1 tablet (800 mg) by mouth every 8 hours as needed for moderate pain       methocarbamol 500 MG tablet    ROBAXIN    30 tablet    Take 2 tablets (1,000 mg) by mouth 3 times daily as needed for muscle spasms

## 2017-06-02 ENCOUNTER — TELEPHONE (OUTPATIENT)
Dept: FAMILY MEDICINE | Facility: CLINIC | Age: 47
End: 2017-06-02

## 2017-06-02 DIAGNOSIS — M54.50 BILATERAL LOW BACK PAIN WITHOUT SCIATICA, UNSPECIFIED CHRONICITY: Primary | ICD-10-CM

## 2017-06-02 NOTE — TELEPHONE ENCOUNTER
MD message noted. No further action needed, closing encounter.     Chelo Price RN  [facility]

## 2017-06-02 NOTE — TELEPHONE ENCOUNTER
Call from Viktor Garcia . She reports she is unable to schedule PT for patient as referral is listed for chiropractic.     Dr. Parham,     Please review and sign cued referral for PT, if okay.     Thank you,   Chelo Price RN  [facility]

## 2017-06-02 NOTE — TELEPHONE ENCOUNTER
I called and spoke to Radha, this is the SAME referral that I  already did  He does not have to do chiro but it is an option

## 2017-06-02 NOTE — TELEPHONE ENCOUNTER
Radha is calling from [employer] who is handling the w/c for Valeriano and they had some questions regarding his therapy orders. She can be reached at [phone] and her extension is [phone].

## 2017-06-02 NOTE — TELEPHONE ENCOUNTER
Left VM for Radha from [employer] to return call to clinic with best way to get ahold of her    Moni Haas RN

## 2017-06-06 ENCOUNTER — THERAPY VISIT (OUTPATIENT)
Dept: PHYSICAL THERAPY | Facility: CLINIC | Age: 47
End: 2017-06-06
Payer: OTHER MISCELLANEOUS

## 2017-06-06 DIAGNOSIS — M54.50 BILATERAL LOW BACK PAIN WITHOUT SCIATICA: Primary | ICD-10-CM

## 2017-06-06 PROCEDURE — 97110 THERAPEUTIC EXERCISES: CPT | Mod: GP | Performed by: PHYSICAL THERAPIST

## 2017-06-06 PROCEDURE — 97161 PT EVAL LOW COMPLEX 20 MIN: CPT | Mod: GP | Performed by: PHYSICAL THERAPIST

## 2017-06-06 NOTE — PROGRESS NOTES
Subjective:    HPI                  Physical Therapy Initial Examination/Evaluation  June 6, 2017    Valeriano Rendon is a 46 year old male referred to physical therapy by Dr. 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  Low back pain lifting up the door to the back of the truck.  Pt saw MD the next day-musculoskeletal-ice and meds decreased the tightness and nodule.  Pt persists more on the right side vs left.  Subjective:  DOI/onset: 5/17/17   Acute Injury or Gradual Onset?: Acute injury onset  Mechanism of Injury: Lifting work injury  Related PMH: chronic low back pain Previous Treatment: NA   Imaging: none  Chief Complaint/Functional Limitations:   Pain with upright sitting and see below in therapy evaluation codes   Pain: rest 3 /10, activity 4/10 Location: LY lumbar region Frequency: Constant Described as: aching and dull Alleviated by: Nothing Progression of Symptoms: Unchanged-right side, left is better Time of day when pain is worse: Activity related  Sleeping: Interrupted due to current issue   Occupation: trunk   Job duties: prolonged sitting, repetitive tasks, lifting/carrying  Current HEP/exercise regimen: none  Patient's goals are see chief complaints return to prior level    Other pertinent PMH/Red Flags: smoking   Barriers at home/work: None as reported by patient  Pertinent Surgical History: none  Medications: Anti-inflammatory  General health as reported by patient: good  Return to MD:  none    No current work restrictions  Normal exercise is walking the dog.  Golf 2-3 times a week.  No numbness or tingling  Objective:    System         Lumbar/SI Evaluation  ROM:    AROM Lumbar:   Flexion:            100%  Ext:                    75% and pain   Side Bend:        Left:  100% and pain    Right:  100%  Rotation:           Left:  100%    Right:  100% and pain  Side Glide:         Left:     Right:           Lumbar Myotomes:  normal                    Lumbar Palpation:  Palpation (lumbar): TTP: right LS fascia.                                                         General     ROS  SI screen: negative   Assessment/Plan:      Patient is a 46 year old male with lumbar complaints.    Patient has the following significant findings with corresponding treatment plan.                Diagnosis 1:  Lumbar strain  Pain -  hot/cold therapy, US, manual therapy, self management, education and home program  Decreased strength - therapeutic exercise and therapeutic activities  Decreased function - therapeutic activities  Impaired posture - neuro re-education    Therapy Evaluation Codes:   1) History comprised of:   Personal factors that impact the plan of care:      None.    Comorbidity factors that impact the plan of care are:      None.     Medications impacting care: None.  2) Examination of Body Systems comprised of:   Body structures and functions that impact the plan of care:      Lumbar spine.   Activity limitations that impact the plan of care are:      Driving, Lifting and Sitting.  3) Clinical presentation characteristics are:   Stable/Uncomplicated.  4) Decision-Making    Low complexity using standardized patient assessment instrument and/or measureable assessment of functional outcome.  Cumulative Therapy Evaluation is: Low complexity.    Previous and current functional limitations:  (See Goal Flow Sheet for this information)    Short term and Long term goals: (See Goal Flow Sheet for this information)     Communication ability:  Patient appears to be able to clearly communicate and understand verbal and written communication and follow directions correctly.  Treatment Explanation - The following has been discussed with the patient:   RX ordered/plan of care  Anticipated outcomes  Possible risks and side effects  This patient would benefit from PT intervention to resume normal activities.    Rehab potential is good.    Frequency:  2 X week, once daily  Duration:  for 2 weeks tapering to 1 X a week over 6 weeks  Discharge Plan:  Achieve all LTG.  Independent in home treatment program.  Reach maximal therapeutic benefit.    Please refer to the daily flowsheet for treatment today, total treatment time and time spent performing 1:1 timed codes.

## 2017-06-06 NOTE — MR AVS SNAPSHOT
"              After Visit Summary   6/6/2017    Valeriano Rendon    MRN: [id number]           Patient Information     Date Of Birth          1970        Visit Information        Provider Department      6/6/2017 4:10 PM Pepe Bermudez, PT [facility]        Today's Diagnoses     Bilateral low back pain without sciatica    -  1       Follow-ups after your visit        Your next 10 appointments already scheduled     Jun 12, 2017  2:50 PM CDT   CONNIE Spine with Pepe Bermudez PT   [facility] ( [facility]    [address] [place] [address] [phone]            Jun 20, 2017  3:30 PM CDT   CONNIE Spine with Pepe Bermudez PT   [facility]    [address] [place] [address] [phone]              Who to contact     If you have questions or need follow up information about today's clinic visit or your schedule please contact [facility] directly at [phone].  Normal or non-critical lab and imaging results will be communicated to you by Mitrohart, letter or phone within 4 business days after the clinic has received the results. If you do not hear from us within 7 days, please contact the clinic through Stickyt or phone. If you have a critical or abnormal lab result, we will notify you by phone as soon as possible.  Submit refill requests through Ubertesters or call your pharmacy and they will forward the refill request to us. Please allow 3 business days for your refill to be completed.          Additional Information About Your Visit        Mitrohart Information     Ubertesters lets you send messages to your doctor, view your test results, renew your prescriptions, schedule appointments and more. To sign up, go to [email or web address] . Click on \"Log in\" on the left side of " "the screen, which will take you to the Welcome page. Then click on \"Sign up Now\" on the right side of the page.     You will be asked to enter the access code listed below, as well as some personal information. Please follow the directions to create your username and password.     Your access code is: [email or web address]  Expires: 2017 12:39 PM     Your access code will  in 90 days. If you need help or a new code, please call your [facility] or [phone].        Care EveryWhere ID     This is your Care EveryWhere ID. This could be used by other organizations to access your [facility] medical records  [id number]         Blood Pressure from Last 3 Encounters:   17 120/74   17 118/84   17 120/76    Weight from Last 3 Encounters:   17 74 kg (163 lb 1.6 oz)   17 73.9 kg (163 lb)   16 72.1 kg (159 lb)              We Performed the Following     CONNIE Inital Eval Report     PT Eval, Low Complexity (93235)     Therapeutic Exercises        Primary Care Provider Office Phone # Fax #    Cosme Nando Parham [phone]       [facility] [address] [address]        Thank you!     Thank you for choosing [facility]  for your care. Our goal is always to provide you with excellent care. Hearing back from our patients is one way we can continue to improve our services. Please take a few minutes to complete the written survey that you may receive in the mail after your visit with us. Thank you!             Your Updated Medication List - Protect others around you: Learn how to safely use, store and throw away your medicines at www.disposemymeds.org.          This list is accurate as of: 17  4:57 PM.  Always use your most recent med list.                   Brand Name Dispense Instructions for use    ibuprofen 800 MG tablet    ADVIL/MOTRIN    30 tablet    Take 1 tablet (800 mg) by mouth every 8 " hours as needed for moderate pain       methocarbamol 500 MG tablet    ROBAXIN    30 tablet    Take 2 tablets (1,000 mg) by mouth 3 times daily as needed for muscle spasms

## 2017-06-12 ENCOUNTER — THERAPY VISIT (OUTPATIENT)
Dept: PHYSICAL THERAPY | Facility: CLINIC | Age: 47
End: 2017-06-12
Payer: OTHER MISCELLANEOUS

## 2017-06-12 DIAGNOSIS — M54.50 BILATERAL LOW BACK PAIN WITHOUT SCIATICA: ICD-10-CM

## 2017-06-12 PROCEDURE — 97110 THERAPEUTIC EXERCISES: CPT | Mod: GP | Performed by: PHYSICAL THERAPIST

## 2017-06-12 PROCEDURE — 97140 MANUAL THERAPY 1/> REGIONS: CPT | Mod: GP | Performed by: PHYSICAL THERAPIST

## 2017-06-20 ENCOUNTER — THERAPY VISIT (OUTPATIENT)
Dept: PHYSICAL THERAPY | Facility: CLINIC | Age: 47
End: 2017-06-20
Payer: OTHER MISCELLANEOUS

## 2017-06-20 DIAGNOSIS — M54.50 BILATERAL LOW BACK PAIN WITHOUT SCIATICA: ICD-10-CM

## 2017-06-20 PROCEDURE — 97140 MANUAL THERAPY 1/> REGIONS: CPT | Mod: GP | Performed by: PHYSICAL THERAPIST

## 2017-06-20 PROCEDURE — 97110 THERAPEUTIC EXERCISES: CPT | Mod: GP | Performed by: PHYSICAL THERAPIST

## 2017-07-05 ENCOUNTER — THERAPY VISIT (OUTPATIENT)
Dept: PHYSICAL THERAPY | Facility: CLINIC | Age: 47
End: 2017-07-05
Payer: OTHER MISCELLANEOUS

## 2017-07-05 DIAGNOSIS — M54.50 BILATERAL LOW BACK PAIN WITHOUT SCIATICA: ICD-10-CM

## 2017-07-05 PROCEDURE — 97140 MANUAL THERAPY 1/> REGIONS: CPT | Mod: GP | Performed by: PHYSICAL THERAPIST

## 2017-07-05 PROCEDURE — 97110 THERAPEUTIC EXERCISES: CPT | Mod: GP | Performed by: PHYSICAL THERAPIST

## 2017-07-19 ENCOUNTER — THERAPY VISIT (OUTPATIENT)
Dept: PHYSICAL THERAPY | Facility: CLINIC | Age: 47
End: 2017-07-19
Payer: OTHER MISCELLANEOUS

## 2017-07-19 DIAGNOSIS — M54.50 BILATERAL LOW BACK PAIN WITHOUT SCIATICA: ICD-10-CM

## 2017-07-19 PROCEDURE — 97140 MANUAL THERAPY 1/> REGIONS: CPT | Mod: GP | Performed by: PHYSICAL THERAPIST

## 2017-07-19 PROCEDURE — 97110 THERAPEUTIC EXERCISES: CPT | Mod: GP | Performed by: PHYSICAL THERAPIST

## 2017-08-07 ENCOUNTER — OFFICE VISIT (OUTPATIENT)
Dept: FAMILY MEDICINE | Facility: CLINIC | Age: 47
End: 2017-08-07
Payer: OTHER MISCELLANEOUS

## 2017-08-07 VITALS
OXYGEN SATURATION: 97 % | DIASTOLIC BLOOD PRESSURE: 76 MMHG | HEART RATE: 85 BPM | TEMPERATURE: 97.6 F | BODY MASS INDEX: 26.66 KG/M2 | WEIGHT: 160.2 LBS | SYSTOLIC BLOOD PRESSURE: 120 MMHG

## 2017-08-07 DIAGNOSIS — G89.29 CHRONIC RIGHT-SIDED LOW BACK PAIN WITHOUT SCIATICA: Primary | ICD-10-CM

## 2017-08-07 DIAGNOSIS — M54.50 CHRONIC RIGHT-SIDED LOW BACK PAIN WITHOUT SCIATICA: Primary | ICD-10-CM

## 2017-08-07 PROCEDURE — 99213 OFFICE O/P EST LOW 20 MIN: CPT | Performed by: FAMILY MEDICINE

## 2017-08-07 NOTE — MR AVS SNAPSHOT
"              After Visit Summary   2017    Valeriano Rendon    MRN: [id number]           Patient Information     Date Of Birth          1970        Visit Information        Provider Department      2017 8:00 AM Cosme Parham MD [facility]        Today's Diagnoses     Chronic right-sided low back pain without sciatica    -  1       Follow-ups after your visit        Who to contact     If you have questions or need follow up information about today's clinic visit or your schedule please contact [facility] directly at [phone].  Normal or non-critical lab and imaging results will be communicated to you by Maskless Lithographyhart, letter or phone within 4 business days after the clinic has received the results. If you do not hear from us within 7 days, please contact the clinic through Maskless Lithographyhart or phone. If you have a critical or abnormal lab result, we will notify you by phone as soon as possible.  Submit refill requests through OneSchool or call your pharmacy and they will forward the refill request to us. Please allow 3 business days for your refill to be completed.          Additional Information About Your Visit        MyChart Information     OneSchool lets you send messages to your doctor, view your test results, renew your prescriptions, schedule appointments and more. To sign up, go to [email or web address] . Click on \"Log in\" on the left side of the screen, which will take you to the Welcome page. Then click on \"Sign up Now\" on the right side of the page.     You will be asked to enter the access code listed below, as well as some personal information. Please follow the directions to create your username and password.     Your access code is: [email or web address]  Expires: 2017 12:39 PM     Your access code will  in 90 days. If you need help or a new code, please call your [facility] or [phone].        Care EveryWhere ID     This is your Care " EveryWhere ID. This could be used by other organizations to access your [facility] medical records  [id number]        Your Vitals Were     Pulse Temperature Pulse Oximetry BMI (Body Mass Index)          85 97.6  F (36.4  C) (Oral) 97% 26.66 kg/m2         Blood Pressure from Last 3 Encounters:   08/07/17 120/76   05/22/17 120/74   05/18/17 118/84    Weight from Last 3 Encounters:   08/07/17 160 lb 3.2 oz (72.7 kg)   05/22/17 163 lb 1.6 oz (74 kg)   05/18/17 163 lb (73.9 kg)              Today, you had the following     No orders found for display       Primary Care Provider Office Phone # Fax #    Cosme Nando Parham [phone]       [facility] [address] [facility] [address]  [facility] [address]        Equal Access to Services     JAVIER ALVAREZ : Hadii aad ku hadasho Soarminali, waaxda luqadaha, qaybta kaalmada adeegyada, waxay mayurin hayvik cazares . So [facility] [phone].    ATENCIÓN: Si habla español, tiene a jean-baptiste disposición servicios gratuitos de asistencia lingüística. Llame al [phone].    We comply with applicable federal civil rights laws and [place] laws. We do not discriminate on the basis of race, color, national origin, age, disability sex, sexual orientation or gender identity.            Thank you!     Thank you for choosing [facility]  for your care. Our goal is always to provide you with excellent care. Hearing back from our patients is one way we can continue to improve our services. Please take a few minutes to complete the written survey that you may receive in the mail after your visit with us. Thank you!             Your Updated Medication List - Protect others around you: Learn how to safely use, store and throw away your medicines at www.disposemymeds.org.          This list is accurate as of: 8/7/17  8:56 AM.  Always use your most recent med list.                   Brand Name Dispense Instructions for use Diagnosis    ibuprofen 800 MG  tablet    ADVIL/MOTRIN    30 tablet    Take 1 tablet (800 mg) by mouth every 8 hours as needed for moderate pain    Acute bilateral low back pain without sciatica       methocarbamol 500 MG tablet    ROBAXIN    30 tablet    Take 2 tablets (1,000 mg) by mouth 3 times daily as needed for muscle spasms    Back muscle spasm

## 2017-08-07 NOTE — PROGRESS NOTES
"  SUBJECTIVE:                                                    Valeriano Rendon is a 47 year old male who presents to clinic today for the following health issues:    Back Injury Follow up      Duration: Middle of May 2017        Specific cause: work-related    Description:    Location of pain: low back right  Character of pain: dull ache and soreness on and off on the right side\" no clear pattern\"   PHYSICAL THERAPY has helped a lot, still doing exercises  Feels like he is 85-90% betttr  Is able to exercise  No job limitaions  Pain radiation:none  New numbness or weakness in legs, not attributed to pain:  no     Intensity: Currently 0-3/10    History:   Pain interferes with job: No,   History of back problems: no prior back problems  Any previous MRI or X-rays: Yes- at [facility].  Date 5/17-18/2017  Sees a specialist for back pain:  No  Therapies tried without relief: none    Alleviating factors:   Improved by: Physical Therapy      Precipitating factors:  Worsened by: Sitting and laying in wrong position      Functional and Psychosocial Screen (Alis STarT Back):      Not performed today          Accompanying Signs & Symptoms:  Risk of Fracture:  None  Risk of Cauda Equina:  None  Risk of Infection:  None  Risk of Cancer:  None  Risk of Ankylosing Spondylitis:  Onset at age <35, male, AND morning back stiffness. no                       Problem list and histories reviewed & adjusted, as indicated.  Additional history: as documented    Labs reviewed in EPIC    Reviewed and updated as needed this visit by clinical staff       Reviewed and updated as needed this visit by Provider         ROS:  Constitutional, HEENT, cardiovascular, pulmonary, gi and gu systems are negative, except as otherwise noted.      OBJECTIVE:   /76  Pulse 85  Temp 97.6  F (36.4  C) (Oral)  Wt 160 lb 3.2 oz (72.7 kg)  SpO2 97%  BMI 26.66 kg/m2  Body mass index is 26.66 kg/(m^2).  GENERAL: healthy, alert and no distress  NECK: " no adenopathy, no asymmetry, masses, or scars and thyroid normal to palpation  RESP: lungs clear to auscultation - no rales, rhonchi or wheezes  CV: regular rate and rhythm, normal S1 S2, no S3 or S4, no murmur, click or rub, no peripheral edema and peripheral pulses strong  ABDOMEN: soft, nontender, no hepatosplenomegaly, no masses and bowel sounds normal  MS: normal muscle tone, normal range of motion and tenderness to palpation right lower SI and right  paraspinal tenderness  negative straight leg raising tests  normal ROM  SKIN: no suspicious lesions or rashes  NEURO: Normal strength and tone, mentation intact and speech normal          ASSESSMENT/PLAN:             1. Chronic right-sided low back pain without sciatica  Slowly improving but not up to MMI  Continue PHYSICAL THERAPY  Follow up in 2 months.       See Patient Instructions    Cosme Parham MD  [facility]

## 2017-08-07 NOTE — NURSING NOTE
"Chief Complaint   Patient presents with     Musculoskeletal Problem       Initial /76  Pulse 85  Temp 97.6  F (36.4  C) (Oral)  Wt 160 lb 3.2 oz (72.7 kg)  SpO2 97%  BMI 26.66 kg/m2 Estimated body mass index is 26.66 kg/(m^2) as calculated from the following:    Height as of 5/22/17: 5' 5\" (1.651 m).    Weight as of this encounter: 160 lb 3.2 oz (72.7 kg).  Medication Reconciliation: complete     Jefe Pearce MA         "
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
## 2017-10-30 ENCOUNTER — HOSPITAL ENCOUNTER (OUTPATIENT)
Dept: GENERAL RADIOLOGY | Facility: CLINIC | Age: 47
Discharge: HOME | End: 2017-10-30
Attending: FAMILY MEDICINE | Admitting: FAMILY MEDICINE
Payer: OTHER MISCELLANEOUS

## 2017-10-30 ENCOUNTER — OFFICE VISIT (OUTPATIENT)
Dept: FAMILY MEDICINE | Facility: CLINIC | Age: 47
End: 2017-10-30
Payer: OTHER MISCELLANEOUS

## 2017-10-30 VITALS
HEART RATE: 114 BPM | OXYGEN SATURATION: 99 % | BODY MASS INDEX: 27.87 KG/M2 | SYSTOLIC BLOOD PRESSURE: 116 MMHG | WEIGHT: 167.5 LBS | DIASTOLIC BLOOD PRESSURE: 82 MMHG | TEMPERATURE: 98 F

## 2017-10-30 DIAGNOSIS — G89.29 CHRONIC RIGHT-SIDED LOW BACK PAIN WITHOUT SCIATICA: Primary | ICD-10-CM

## 2017-10-30 DIAGNOSIS — G89.29 CHRONIC RIGHT-SIDED LOW BACK PAIN WITHOUT SCIATICA: ICD-10-CM

## 2017-10-30 DIAGNOSIS — M54.50 CHRONIC RIGHT-SIDED LOW BACK PAIN WITHOUT SCIATICA: Primary | ICD-10-CM

## 2017-10-30 DIAGNOSIS — M54.50 CHRONIC RIGHT-SIDED LOW BACK PAIN WITHOUT SCIATICA: ICD-10-CM

## 2017-10-30 PROCEDURE — 72100 X-RAY EXAM L-S SPINE 2/3 VWS: CPT

## 2017-10-30 PROCEDURE — 99213 OFFICE O/P EST LOW 20 MIN: CPT | Performed by: FAMILY MEDICINE

## 2017-10-30 NOTE — NURSING NOTE
"Chief Complaint   Patient presents with     Back Pain     Work Comp       Initial /82 (BP Location: Right arm, Patient Position: Chair, Cuff Size: Adult Regular)  Pulse 114  Temp 98  F (36.7  C) (Oral)  Wt 167 lb 8 oz (76 kg)  SpO2 99%  BMI 27.87 kg/m2 Estimated body mass index is 27.87 kg/(m^2) as calculated from the following:    Height as of 5/22/17: 5' 5\" (1.651 m).    Weight as of this encounter: 167 lb 8 oz (76 kg).  Medication Reconciliation: complete     Denisse Hong CMA    "

## 2017-10-30 NOTE — MR AVS SNAPSHOT
After Visit Summary   10/30/2017    Valeriano Rendon    MRN: [id number]           Patient Information     Date Of Birth          1970        Visit Information        Provider Department      10/30/2017 2:00 PM Cosme Parham MD [facility]        Today's Diagnoses     Chronic right-sided low back pain without sciatica    -  1       Follow-ups after your visit        Additional Services     ORTHO  REFERRAL       [facility] is referring you to the Orthopedic  Services at [facility].       The  Representative will assist you in the coordination of your Orthopedic and Musculoskeletal Care as prescribed by your physician.    The  Representative will call you within 1 business day to help schedule your appointment, or you may contact the [facility] Representative at:    All areas ~ [phone]     Type of Referral : Spine: Lumbar  **Choose Medical Spine Specialist (unless patient was seen by a Medical Spine Specialist within the past 6 months).**  Surgical Evaluation is advised if the patient presents with one or more of the following red flags: Evidence of Spinal Tumor, Infection or Fracture, Cauda Equina Syndrome, Sudden or Progressive Weakness, Loss of Bowel or Bladder Control, or any other documented emergent neurological condition resulting from a Lumbar Spinal Condition. Medical Spine Specialist        Timeframe requested: Within 2 weeks    Coverage of these services is subject to the terms and limitations of your health insurance plan.  Please call member services at your health plan with any benefit or coverage questions.      If X-rays, CT or MRI's have been performed, please contact the facility where they were done to arrange for , prior to your scheduled appointment.  Please bring this referral request to your appointment and present it to your specialist.                 "  Future tests that were ordered for you today     Open Future Orders        Priority Expected Expires Ordered    XR Lumbar Spine 2/3 Views Routine 10/30/2017 10/30/2018 10/30/2017            Who to contact     If you have questions or need follow up information about today's clinic visit or your schedule please contact [facility] directly at [phone].  Normal or non-critical lab and imaging results will be communicated to you by MyChart, letter or phone within 4 business days after the clinic has received the results. If you do not hear from us within 7 days, please contact the clinic through [employer] or phone. If you have a critical or abnormal lab result, we will notify you by phone as soon as possible.  Submit refill requests through [employer] or call your pharmacy and they will forward the refill request to us. Please allow 3 business days for your refill to be completed.          Additional Information About Your Visit        [employer] Information     [employer] lets you send messages to your doctor, view your test results, renew your prescriptions, schedule appointments and more. To sign up, go to [email or web address] . Click on \"Log in\" on the left side of the screen, which will take you to the Welcome page. Then click on \"Sign up Now\" on the right side of the page.     You will be asked to enter the access code listed below, as well as some personal information. Please follow the directions to create your username and password.     Your access code is: [email or web address]  Expires: 2018  2:21 PM     Your access code will  in 90 days. If you need help or a new code, please call your [facility]] or [phone].        Care EveryWhere ID     This is your Care EveryWhere ID. This could be used by other organizations to access your [place] medical records  [id number]        Your Vitals Were     Pulse Temperature Pulse Oximetry BMI (Body Mass Index)          114 98  F (36.7  C) (Oral) " 99% 27.87 kg/m2         Blood Pressure from Last 3 Encounters:   10/30/17 116/82   08/07/17 120/76   05/22/17 120/74    Weight from Last 3 Encounters:   10/30/17 167 lb 8 oz (76 kg)   08/07/17 160 lb 3.2 oz (72.7 kg)   05/22/17 163 lb 1.6 oz (74 kg)              We Performed the Following     ORTHO  REFERRAL        Primary Care Provider Office Phone # Fax #    Cosme Nando Parham [phone]       [address] [facility] [address]  [facility] [address]        Equal Access to Services     [facility]: Hadii aad kishan hadcolbyo Sooctavio, waaxda luqadaha, qaybta kaalmada adebudyada, nam cazares . So [facility] [phone].    ATENCIÓN: Si habla español, tiene a jean-baptiste disposición servicios gratuitos de asistencia lingüística. [facility] [phone].    We comply with applicable federal civil rights laws and [place] laws. We do not discriminate on the basis of race, color, national origin, age, disability, sex, sexual orientation, or gender identity.            Thank you!     Thank you for choosing [facility]  for your care. Our goal is always to provide you with excellent care. Hearing back from our patients is one way we can continue to improve our services. Please take a few minutes to complete the written survey that you may receive in the mail after your visit with us. Thank you!             Your Updated Medication List - Protect others around you: Learn how to safely use, store and throw away your medicines at www.disposemymeds.org.          This list is accurate as of: 10/30/17  2:21 PM.  Always use your most recent med list.                   Brand Name Dispense Instructions for use Diagnosis    ibuprofen 800 MG tablet    ADVIL/MOTRIN    30 tablet    Take 1 tablet (800 mg) by mouth every 8 hours as needed for moderate pain    Acute bilateral low back pain without sciatica       methocarbamol 500 MG tablet    ROBAXIN    30 tablet    Take 2 tablets (1,000 mg) by  mouth 3 times daily as needed for muscle spasms    Back muscle spasm

## 2017-11-08 ENCOUNTER — OFFICE VISIT (OUTPATIENT)
Dept: NEUROSURGERY | Facility: CLINIC | Age: 47
End: 2017-11-08
Attending: PHYSICIAN ASSISTANT
Payer: OTHER MISCELLANEOUS

## 2017-11-08 VITALS
TEMPERATURE: 98.2 F | BODY MASS INDEX: 27.49 KG/M2 | HEART RATE: 118 BPM | SYSTOLIC BLOOD PRESSURE: 119 MMHG | WEIGHT: 165 LBS | HEIGHT: 65 IN | OXYGEN SATURATION: 96 % | DIASTOLIC BLOOD PRESSURE: 79 MMHG

## 2017-11-08 DIAGNOSIS — M54.16 LUMBAR RADICULOPATHY: Primary | ICD-10-CM

## 2017-11-08 PROCEDURE — 99211 OFF/OP EST MAY X REQ PHY/QHP: CPT | Performed by: PHYSICIAN ASSISTANT

## 2017-11-08 PROCEDURE — 99244 OFF/OP CNSLTJ NEW/EST MOD 40: CPT | Performed by: PHYSICIAN ASSISTANT
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
## 2017-11-08 NOTE — MR AVS SNAPSHOT
"              After Visit Summary   11/8/2017    Valeriano Rendon    MRN: [id number]           Patient Information     Date Of Birth          1970        Visit Information        Provider Department      11/8/2017 11:45 AM Tonya Mercer PA-C [facility]        Today's Diagnoses     Lumbar radiculopathy    -  1      Care Instructions    Lumbar MRI ordered - I will call you with results          Follow-ups after your visit        Future tests that were ordered for you today     Open Future Orders        Priority Expected Expires Ordered    MR Lumbar Spine w/o Contrast Routine  11/8/2018 11/8/2017            Who to contact     If you have questions or need follow up information about today's clinic visit or your schedule please contact [facility] directly at [phone].  Normal or non-critical lab and imaging results will be communicated to you by MyChart, letter or phone within 4 business days after the clinic has received the results. If you do not hear from us within 7 days, please contact the clinic through MyChart or phone. If you have a critical or abnormal lab result, we will notify you by phone as soon as possible.  Submit refill requests through [employer] or call your pharmacy and they will forward the refill request to us. Please allow 3 business days for your refill to be completed.          Additional Information About Your Visit        MyChart Information     [employer] lets you send messages to your doctor, view your test results, renew your prescriptions, schedule appointments and more. To sign up, go to [email or web address] . Click on \"Log in\" on the left side of the screen, which will take you to the Welcome page. Then click on \"Sign up Now\" on the right side of the page.     You will be asked to enter the access code listed below, as well as some personal information. Please follow the directions to create your username and " "password.     Your access code is: [email or web address]  Expires: 2018  1:21 PM     Your access code will  in 90 days. If you need help or a new code, please call your [place] clinic or [phone].        Care EveryWhere ID     This is your Care EveryWhere ID. This could be used by other organizations to access your [place] medical records  [id number]        Your Vitals Were     Pulse Temperature Height Pulse Oximetry BMI (Body Mass Index)       118 98.2  F (36.8  C) (Oral) 5' 5\" (1.651 m) 96% 27.46 kg/m2        Blood Pressure from Last 3 Encounters:   17 119/79   10/30/17 116/82   17 120/76    Weight from Last 3 Encounters:   17 165 lb (74.8 kg)   10/30/17 167 lb 8 oz (76 kg)   17 160 lb 3.2 oz (72.7 kg)               Primary Care Provider Office Phone # Fax #    Cosme Nando Parham [phone]       [address]  [facility] [address]        Equal Access to Services     [facility]: Hadii anne-marie holley hadasho Soomaali, waaxda luqadaha, qaybta kaalmada adeegyada, nam gonzalez. So [facility] [phone].    ATENCIÓN: Si habla español, tiene a jean-baptiste disposición servicios gratuitos de asistencia lingüística. Llame al [phone].    We comply with applicable federal civil rights laws and [place] laws. We do not discriminate on the basis of race, color, national origin, age, disability, sex, sexual orientation, or gender identity.            Thank you!     Thank you for choosing [facility]  for your care. Our goal is always to provide you with excellent care. Hearing back from our patients is one way we can continue to improve our services. Please take a few minutes to complete the written survey that you may receive in the mail after your visit with us. Thank you!             Your Updated Medication List - Protect others around you: Learn how to safely use, store and throw away your medicines at " www.disposemymeds.org.          This list is accurate as of: 11/8/17 12:24 PM.  Always use your most recent med list.                   Brand Name Dispense Instructions for use Diagnosis    ibuprofen 800 MG tablet    ADVIL/MOTRIN    30 tablet    Take 1 tablet (800 mg) by mouth every 8 hours as needed for moderate pain    Acute bilateral low back pain without sciatica       methocarbamol 500 MG tablet    ROBAXIN    30 tablet    Take 2 tablets (1,000 mg) by mouth 3 times daily as needed for muscle spasms    Back muscle spasm

## 2017-11-08 NOTE — NURSING NOTE
"Valeriano Rendon is a 47 year old male who presents for:  Chief Complaint   Patient presents with     Neurologic Problem     referral from Dr. Yates for right sided low back pain with sciatica        Initial Vitals:  There were no vitals taken for this visit. Estimated body mass index is 27.87 kg/(m^2) as calculated from the following:    Height as of 5/22/17: 5' 5\" (1.651 m).    Weight as of 10/30/17: 167 lb 8 oz (76 kg).. There is no height or weight on file to calculate BSA. BP completed using cuff size: regular  Data Unavailable    Do you feel safe in your environment?  Yes  Do you need any refills today? No    Nursing Comments: referral from Dr. Yates for right sided low back pain with sciatica.  Patient rates his pain today as 1-2 now,  6-7 at worst,  W/C  5-18-17      5 min. nursing intake time  Ella Hernandez CMA, AAS      Discharge plan:   See providers dictation   2 min. nursing discharge time  Ella Hernandez CMA, AAS       "

## 2017-12-12 ENCOUNTER — HOSPITAL ENCOUNTER (OUTPATIENT)
Dept: MRI IMAGING | Facility: CLINIC | Age: 47
Discharge: HOME | End: 2017-12-12
Attending: PHYSICIAN ASSISTANT | Admitting: PHYSICIAN ASSISTANT
Payer: OTHER MISCELLANEOUS

## 2017-12-12 DIAGNOSIS — M54.16 LUMBAR RADICULOPATHY: ICD-10-CM

## 2017-12-12 PROCEDURE — 72148 MRI LUMBAR SPINE W/O DYE: CPT

## 2017-12-14 ENCOUNTER — TELEPHONE (OUTPATIENT)
Dept: NEUROSURGERY | Facility: CLINIC | Age: 47
End: 2017-12-14

## 2017-12-14 DIAGNOSIS — M54.16 LUMBAR RADICULOPATHY: Primary | ICD-10-CM

## 2017-12-14 NOTE — TELEPHONE ENCOUNTER
Tonya Mercer PA-C reviewed recent MRI, and recommends referral for LESI and PT. Discussed this with patient, and he's in agreement with PT, but would like to hold off on an injection at this point. He will call back if he decides on trying an injection.     Placed order for physical therapy. Patient will call back with any questions or concerns.

## 2018-01-02 ENCOUNTER — THERAPY VISIT (OUTPATIENT)
Dept: PHYSICAL THERAPY | Facility: CLINIC | Age: 48
End: 2018-01-02
Payer: OTHER MISCELLANEOUS

## 2018-01-02 DIAGNOSIS — M54.50 BILATERAL LOW BACK PAIN WITHOUT SCIATICA: ICD-10-CM

## 2018-01-02 PROCEDURE — 97530 THERAPEUTIC ACTIVITIES: CPT | Mod: GP | Performed by: PHYSICAL THERAPIST

## 2018-01-02 PROCEDURE — 97110 THERAPEUTIC EXERCISES: CPT | Mod: GP | Performed by: PHYSICAL THERAPIST

## 2018-01-02 PROCEDURE — 97140 MANUAL THERAPY 1/> REGIONS: CPT | Mod: GP | Performed by: PHYSICAL THERAPIST

## 2018-01-02 NOTE — PROGRESS NOTES
Subjective:  HPI                    Objective:  System    Physical Exam    General     ROS    Assessment/Plan:    PROGRESS  REPORT      SUBJECTIVE  Pt reports the right low back pain never really improved but the left side did from the core exercises.  Pt reports he also got an MRI which showed a mild disc herniation.  Pt reports he also got a pop out of his low back which really seemed to loosen things up.   Pt reports he would like to try something that helps stretch his back out.    OBJECTIVE     Trailed lumbar traction     ASSESSMENT/PLAN  Updated problem list and treatment plan: Diagnosis 1:  LBP  Pain -  hot/cold therapy  Decreased ROM/flexibility - manual therapy and therapeutic exercise  Decreased joint mobility - manual therapy and therapeutic exercise  Decreased strength - therapeutic exercise and therapeutic activities  Impaired balance - neuro re-education and therapeutic activities  Decreased proprioception - neuro re-education and therapeutic activities  Inflammation - cold therapy  Impaired muscle performance - neuro re-education  Decreased function - therapeutic activities  Impaired posture - neuro re-education  STG/LTGs have been met or progress has been made towards goals:  Yes (See Goal flow sheet completed today.)  Assessment of Progress: The patient's condition is improving.  Self Management Plans:  Patient has been instructed in a home treatment program.  Patient is independent in a home treatment program.  I have re-evaluated this patient and find that the nature, scope, duration and intensity of the therapy is appropriate for the medical condition of the patient.  Valeriano continues to require the following intervention to meet STG and LTG's:  PT    Recommendations:  This patient would benefit from continued therapy.     Frequency:  1 X week, once daily  Duration:  for 6 weeks          Please refer to the daily flowsheet for treatment today, total treatment time and time spent performing 1:1  timed codes.

## 2018-01-02 NOTE — MR AVS SNAPSHOT
"              After Visit Summary   1/2/2018    Valeriano Rendon    MRN: [id number]           Patient Information     Date Of Birth          1970        Visit Information        Provider Department      1/2/2018 2:40 PM Ron Lund, PT [facility]        Today's Diagnoses     Bilateral low back pain without sciatica           Follow-ups after your visit        Your next 10 appointments already scheduled     Jan 09, 2018 12:40 PM CST   CONN[facility] Spine with Ron Lund PT   [facility] ( [facility])    [address] [place] [address] [id number] [phone]              Who to contact     If you have questions or need follow up information about today's clinic visit or your schedule please contact [facility] directly at [phone].  Normal or non-critical lab and imaging results will be communicated to you by [employer], letter or phone within 4 business days after the clinic has received the results. If you do not hear from us within 7 days, please contact the clinic through [employer] or phone. If you have a critical or abnormal lab result, we will notify you by phone as soon as possible.  Submit refill requests through [employer] or call your pharmacy and they will forward the refill request to us. Please allow 3 business days for your refill to be completed.          Additional Information About Your Visit        [employer] Information     [employer] lets you send messages to your doctor, view your test results, renew your prescriptions, schedule appointments and more. To sign up, go to [email or web address] . Click on \"Log in\" on the left side of the screen, which will take you to the Welcome page. Then click on \"Sign up Now\" on the right side of the page.     You will be asked to enter the access code listed below, as well as some personal information. Please " follow the directions to create your username and password.     Your access code is: [email or web address]  Expires: 2018  1:21 PM     Your access code will  in 90 days. If you need help or a new code, please call your [place] clinic or [phone].        Care EveryWhere ID     This is your Care EveryWhere ID. This could be used by other organizations to access your [place] medical records  [id number]         Blood Pressure from Last 3 Encounters:   17 119/79   10/30/17 116/82   17 120/76    Weight from Last 3 Encounters:   17 74.8 kg (165 lb)   10/30/17 76 kg (167 lb 8 oz)   17 72.7 kg (160 lb 3.2 oz)              We Performed the Following     Manual Ther Tech, 1+Regions, EA 15 min     Therapeutic Activities     Therapeutic Exercises        Primary Care Provider Office Phone # Fax #    Cosme Nando Parham [phone]       [address] [address]        Equal Access to Services     [facility] : Hadii aad ku hadasho Soomaali, waaxda luqadaha, qaybta kaalmada donald, nam cazares . So [facility] [phone].    ATENCIÓN: Si habla español, tiene a jean-baptiste disposición servicios gratuitos de asistencia lingüística. Kindra al [phone].    We comply with applicable federal civil rights laws and [place] laws. We do not discriminate on the basis of race, color, national origin, age, disability, sex, sexual orientation, or gender identity.            Thank you!     Thank you for choosing [facility] [place]  for your care. Our goal is always to provide you with excellent care. Hearing back from our patients is one way we can continue to improve our services. Please take a few minutes to complete the written survey that you may receive in the mail after your visit with us. Thank you!             Your Updated Medication List - Protect others around you: Learn how to safely use, store and throw away  your medicines at www.disposemymeds.org.          This list is accurate as of: 1/2/18  3:56 PM.  Always use your most recent med list.                   Brand Name Dispense Instructions for use Diagnosis    ibuprofen 800 MG tablet    ADVIL/MOTRIN    30 tablet    Take 1 tablet (800 mg) by mouth every 8 hours as needed for moderate pain    Acute bilateral low back pain without sciatica       methocarbamol 500 MG tablet    ROBAXIN    30 tablet    Take 2 tablets (1,000 mg) by mouth 3 times daily as needed for muscle spasms    Back muscle spasm

## 2018-01-09 ENCOUNTER — TELEPHONE (OUTPATIENT)
Dept: PHYSICAL THERAPY | Facility: CLINIC | Age: 48
End: 2018-01-09

## 2018-01-09 NOTE — TELEPHONE ENCOUNTER
Contacted and left voicemail for :Avinash Angel [phone] (claim [id number]) to request 4 more PT visits.

## 2018-01-16 ENCOUNTER — THERAPY VISIT (OUTPATIENT)
Dept: PHYSICAL THERAPY | Facility: CLINIC | Age: 48
End: 2018-01-16
Payer: OTHER MISCELLANEOUS

## 2018-01-16 DIAGNOSIS — M54.50 BILATERAL LOW BACK PAIN WITHOUT SCIATICA: ICD-10-CM

## 2018-01-16 PROCEDURE — 97530 THERAPEUTIC ACTIVITIES: CPT | Mod: GP | Performed by: PHYSICAL THERAPIST

## 2018-01-16 PROCEDURE — 97012 MECHANICAL TRACTION THERAPY: CPT | Mod: GP | Performed by: PHYSICAL THERAPIST

## 2018-01-23 ENCOUNTER — THERAPY VISIT (OUTPATIENT)
Dept: PHYSICAL THERAPY | Facility: CLINIC | Age: 48
End: 2018-01-23
Payer: OTHER MISCELLANEOUS

## 2018-01-23 DIAGNOSIS — M54.50 BILATERAL LOW BACK PAIN WITHOUT SCIATICA: ICD-10-CM

## 2018-01-23 PROCEDURE — 97530 THERAPEUTIC ACTIVITIES: CPT | Mod: GP | Performed by: PHYSICAL THERAPIST

## 2018-01-23 PROCEDURE — 97012 MECHANICAL TRACTION THERAPY: CPT | Mod: GP | Performed by: PHYSICAL THERAPIST

## 2018-01-30 ENCOUNTER — THERAPY VISIT (OUTPATIENT)
Dept: PHYSICAL THERAPY | Facility: CLINIC | Age: 48
End: 2018-01-30
Payer: OTHER MISCELLANEOUS

## 2018-01-30 DIAGNOSIS — M54.50 BILATERAL LOW BACK PAIN WITHOUT SCIATICA: ICD-10-CM

## 2018-01-30 PROCEDURE — 97530 THERAPEUTIC ACTIVITIES: CPT | Mod: GP | Performed by: PHYSICAL THERAPIST

## 2018-01-30 PROCEDURE — 97012 MECHANICAL TRACTION THERAPY: CPT | Mod: GP | Performed by: PHYSICAL THERAPIST

## 2018-02-06 ENCOUNTER — THERAPY VISIT (OUTPATIENT)
Dept: PHYSICAL THERAPY | Facility: CLINIC | Age: 48
End: 2018-02-06
Payer: OTHER MISCELLANEOUS

## 2018-02-06 DIAGNOSIS — M54.50 BILATERAL LOW BACK PAIN WITHOUT SCIATICA: ICD-10-CM

## 2018-02-06 PROCEDURE — 97530 THERAPEUTIC ACTIVITIES: CPT | Mod: GP | Performed by: PHYSICAL THERAPIST

## 2018-02-06 PROCEDURE — 97110 THERAPEUTIC EXERCISES: CPT | Mod: GP | Performed by: PHYSICAL THERAPIST

## 2018-02-06 PROCEDURE — 97012 MECHANICAL TRACTION THERAPY: CPT | Mod: GP | Performed by: PHYSICAL THERAPIST

## 2018-02-06 NOTE — MR AVS SNAPSHOT
"              After Visit Summary   2018    Valeriano Rendon    MRN: [id number]           Patient Information     Date Of Birth          1970        Visit Information        Provider Department      2018 2:00 PM Ron Lund PT [facility]        Today's Diagnoses     Bilateral low back pain without sciatica           Follow-ups after your visit        Who to contact     If you have questions or need follow up information about today's clinic visit or your schedule please contact [facility] directly at [phone].  Normal or non-critical lab and imaging results will be communicated to you by Dot[employer]art, letter or phone within 4 business days after the clinic has received the results. If you do not hear from us within 7 days, please contact the clinic through [employer] or phone. If you have a critical or abnormal lab result, we will notify you by phone as soon as possible.  Submit refill requests through [employer] or call your pharmacy and they will forward the refill request to us. Please allow 3 business days for your refill to be completed.          Additional Information About Your Visit        MyChart Information     [employer] lets you send messages to your doctor, view your test results, renew your prescriptions, schedule appointments and more. To sign up, go to [email or web address] . Click on \"Log in\" on the left side of the screen, which will take you to the Welcome page. Then click on \"Sign up Now\" on the right side of the page.     You will be asked to enter the access code listed below, as well as some personal information. Please follow the directions to create your username and password.     Your access code is: [email or web address]  Expires: 2018  2:42 PM     Your access code will  in 90 days. If you need help or a new code, please call your [place] clinic or [phone].        Care EveryWhere ID     " This is your Care EveryWhere ID. This could be used by other organizations to access your [place] medical records  [id number]         Blood Pressure from Last 3 Encounters:   11/08/17 119/79   10/30/17 116/82   08/07/17 120/76    Weight from Last 3 Encounters:   11/08/17 74.8 kg (165 lb)   10/30/17 76 kg (167 lb 8 oz)   08/07/17 72.7 kg (160 lb 3.2 oz)              We Performed the Following     Mechanical Traction Therapy     Therapeutic Activities        Primary Care Provider Office Phone # Fax #    Cosme Nando Parham [phone]       [address] [facility] [address]  [facility] [address]        Equal Access to Services     EDGAR ALVAREZ : Hadii anne-marie velásquezo Sooctavio, waaxda luqadaha, qaybta kaalmada adeegyada, nam cazares . So [facility] [phone].    ATENCIÓN: Si habla español, tiene a jean-baptiste disposición servicios gratuitos de asistencia lingüística. [facility] [phone].    We comply with applicable federal civil rights laws and [place] laws. We do not discriminate on the basis of race, color, national origin, age, disability, sex, sexual orientation, or gender identity.            Thank you!     [facility] [place]  for your care. Our goal is always to provide you with excellent care. Hearing back from our patients is one way we can continue to improve our services. Please take a few minutes to complete the written survey that you may receive in the mail after your visit with us. Thank you!             Your Updated Medication List - Protect others around you: Learn how to safely use, store and throw away your medicines at www.disposemymeds.org.          This list is accurate as of 2/6/18  2:25 PM.  Always use your most recent med list.                   Brand Name Dispense Instructions for use Diagnosis    ibuprofen 800 MG tablet    ADVIL/MOTRIN    30 tablet    Take 1 tablet (800 mg) by mouth every 8 hours as needed  for moderate pain    Acute bilateral low back pain without sciatica       methocarbamol 500 MG tablet    ROBAXIN    30 tablet    Take 2 tablets (1,000 mg) by mouth 3 times daily as needed for muscle spasms    Back muscle spasm

## 2018-02-13 ENCOUNTER — THERAPY VISIT (OUTPATIENT)
Dept: PHYSICAL THERAPY | Facility: CLINIC | Age: 48
End: 2018-02-13
Payer: OTHER MISCELLANEOUS

## 2018-02-13 ENCOUNTER — TELEPHONE (OUTPATIENT)
Dept: PHYSICAL THERAPY | Facility: CLINIC | Age: 48
End: 2018-02-13

## 2018-02-13 DIAGNOSIS — M54.50 BILATERAL LOW BACK PAIN WITHOUT SCIATICA: ICD-10-CM

## 2018-02-13 PROCEDURE — 97530 THERAPEUTIC ACTIVITIES: CPT | Mod: GP | Performed by: PHYSICAL THERAPIST

## 2018-02-13 PROCEDURE — 97012 MECHANICAL TRACTION THERAPY: CPT | Mod: GP | Performed by: PHYSICAL THERAPIST

## 2018-02-13 NOTE — MR AVS SNAPSHOT
"              After Visit Summary   2018    Valeriano Rendon    MRN: [id number]           Patient Information     Date Of Birth          1970        Visit Information        Provider Department      2018 4:00 PM Ron Lund PT [facility]        Today's Diagnoses     Bilateral low back pain without sciatica           Follow-ups after your visit        Who to contact     If you have questions or need follow up information about today's clinic visit or your schedule please contact [facility] directly at [phone].  Normal or non-critical lab and imaging results will be communicated to you by [employer], letter or phone within 4 business days after the clinic has received the results. If you do not hear from us within 7 days, please contact the clinic through [employer] or phone. If you have a critical or abnormal lab result, we will notify you by phone as soon as possible.  Submit refill requests through [employer] or call your pharmacy and they will forward the refill request to us. Please allow 3 business days for your refill to be completed.          Additional Information About Your Visit        MyChart Information     [employer] lets you send messages to your doctor, view your test results, renew your prescriptions, schedule appointments and more. To sign up, go to [email or web address] . Click on \"Log in\" on the left side of the screen, which will take you to the Welcome page. Then click on \"Sign up Now\" on the right side of the page.     You will be asked to enter the access code listed below, as well as some personal information. Please follow the directions to create your username and password.     Your access code is: [email or web address]  Expires: 2018  2:42 PM     Your access code will  in 90 days. If you need help or a new code, please call your [place] clinic or [phone].        Care EveryWhere ID     " This is your Care EveryWhere ID. This could be used by other organizations to access your [place] medical records  [id number]         Blood Pressure from Last 3 Encounters:   11/08/17 119/79   10/30/17 116/82   08/07/17 120/76    Weight from Last 3 Encounters:   11/08/17 74.8 kg (165 lb)   10/30/17 76 kg (167 lb 8 oz)   08/07/17 72.7 kg (160 lb 3.2 oz)              We Performed the Following     Mechanical Traction Therapy     Therapeutic Activities        Primary Care Provider Office Phone # Fax #    Cosme Nando Parham [phone]       [address] [facility] [address]  [facility] [address]        Equal Access to Services     EDGAR ALVAREZ : Hadii anne-marie velásquezo Sooctavio, waaxda luqadaha, qaybta kaalmada adeegyada, nam cazares . So [facility] [phone].    ATENCIÓN: Si habla español, tiene a jean-baptiste disposición servicios gratuitos de asistencia lingüística. [facility] [phone].    We comply with applicable federal civil rights laws and [place] laws. We do not discriminate on the basis of race, color, national origin, age, disability, sex, sexual orientation, or gender identity.            Thank you!     [facility] [place]  for your care. Our goal is always to provide you with excellent care. Hearing back from our patients is one way we can continue to improve our services. Please take a few minutes to complete the written survey that you may receive in the mail after your visit with us. Thank you!             Your Updated Medication List - Protect others around you: Learn how to safely use, store and throw away your medicines at www.disposemymeds.org.          This list is accurate as of 2/13/18  4:32 PM.  Always use your most recent med list.                   Brand Name Dispense Instructions for use Diagnosis    ibuprofen 800 MG tablet    ADVIL/MOTRIN    30 tablet    Take 1 tablet (800 mg) by mouth every 8 hours as needed  for moderate pain    Acute bilateral low back pain without sciatica       methocarbamol 500 MG tablet    ROBAXIN    30 tablet    Take 2 tablets (1,000 mg) by mouth 3 times daily as needed for muscle spasms    Back muscle spasm

## 2018-02-13 NOTE — PROGRESS NOTES
Subjective:  HPI                    Objective:  System    Physical Exam    General     ROS2    Assessment/Plan:    PROGRESS  REPORT    Overall impression:  Pt could benefit from future PT to decreased pain and allow to for functional bending and lifting.      SUBJECTIVE  JOCELYNE 22.  Patient reports the last few days the low back has felt pretty sore.  He is not sure why.  Overall he feels 70-80% better.  He feels like the traction is helping and would to try a few more sessions.   He has gotten the most benefit from the traction.        OBJECTIVE    Lumbar AROM:   Flex 50%, ++   Ext 80%, +   Rot WNL   SB WNL    Squat WNL    SL squat WNL.    Amb WNL without deviation    Lifting: painful lifting more than 5lbs that is in front of his body      ASSESSMENT/PLAN  Updated problem list and treatment plan: Diagnosis 1:  LBP  Pain -  hot/cold therapy  Decreased ROM/flexibility - manual therapy and therapeutic exercise  Decreased joint mobility - manual therapy and therapeutic exercise  Decreased strength - therapeutic exercise and therapeutic activities  Impaired balance - neuro re-education and therapeutic activities  Decreased proprioception - neuro re-education and therapeutic activities  Inflammation - cold therapy  Impaired gait - gait training  Impaired muscle performance - neuro re-education  Decreased function - therapeutic activities  Impaired posture - neuro re-education  STG/LTGs have been met or progress has been made towards goals:  Yes (See Goal flow sheet completed today.)  Assessment of Progress: The patient's condition has potential to improve.  Self Management Plans:  Patient has been instructed in a home treatment program.  I have re-evaluated this patient and find that the nature, scope, duration and intensity of the therapy is appropriate for the medical condition of the patient.  Valeriano continues to require the following intervention to meet STG and LTG's:  PT    Recommendations:  This patient would  benefit from continued therapy.     Frequency:  1 X week, once daily  Duration:  for 4 weeks        Please refer to the daily flowsheet for treatment today, total treatment time and time spent performing 1:1 timed codes.

## 2018-02-13 NOTE — LETTER
[facility] [place] [id number] [address]  [address]  [facility] [address]  [phone]    2018    Re: Valeriano Rendon   :   1970  MRN:  [id number]   REFERRING PHYSICIAN:   Tonya Mercer    [facility] [place]    Date of Initial Evaluation:  17  Visits:  Rxs Used: 11  Reason for Referral:  Bilateral low back pain without sciatica    EVALUATION SUMMARY    Assessment/Plan:    PROGRESS  REPORT    Overall impression:  Pt could benefit from future PT to decreased pain and allow to for functional bending and lifting.      SUBJECTIVE  JOCELYNE 22.  Patient reports the last few days the low back has felt pretty sore.  He is not sure why.  Overall he feels 70-80% better.  He feels like the traction is helping and would to try a few more sessions.   He has gotten the most benefit from the traction.        OBJECTIVE    Lumbar AROM:   Flex 50%, ++   Ext 80%, +   Rot WNL   SB WNL    Squat WNL    SL squat WNL.    Amb WNL without deviation    Lifting: painful lifting more than 5lbs that is in front of his body      ASSESSMENT/PLAN  Updated problem list and treatment plan: Diagnosis 1:  LBP  Pain -  hot/cold therapy  Decreased ROM/flexibility - manual therapy and therapeutic exercise  Decreased joint mobility - manual therapy and therapeutic exercise  Decreased strength - therapeutic exercise and therapeutic activities  Impaired balance - neuro re-education and therapeutic activities  Decreased proprioception - neuro re-education and therapeutic activities  Inflammation - cold therapy  Impaired gait - gait training  Impaired muscle performance - neuro re-education  Decreased function - therapeutic activities  Impaired posture - neuro re-education  STG/LTGs have been met or progress has been made towards goals:  Yes (See Goal flow sheet completed today.)  Assessment of Progress: The patient's condition has potential to improve.  Self  Management Plans:  Patient has been instructed in a home treatment program.  I have re-evaluated this patient and find that the nature, scope, duration and intensity of the therapy is appropriate for the medical condition of the patient.  Valeriano continues to require the following intervention to meet STG and LTG's:  PT    Recommendations:  This patient would benefit from continued therapy.     Frequency:  1 X week, once daily  Duration:  for 4 weeks        Please refer to the daily flowsheet for treatment today, total treatment time and time spent performing 1:1 timed codes.              Thank you for your referral.    INQUIRIES  Therapist: Ron Lund, PT, DPT, SCS, [facility]  [address] [place] [address]  Phone: [phone]  Fax: [phone]

## 2018-02-20 ENCOUNTER — THERAPY VISIT (OUTPATIENT)
Dept: PHYSICAL THERAPY | Facility: CLINIC | Age: 48
End: 2018-02-20
Payer: OTHER MISCELLANEOUS

## 2018-02-20 ENCOUNTER — TELEPHONE (OUTPATIENT)
Dept: PHYSICAL THERAPY | Facility: CLINIC | Age: 48
End: 2018-02-20

## 2018-02-20 DIAGNOSIS — M54.50 BILATERAL LOW BACK PAIN WITHOUT SCIATICA: ICD-10-CM

## 2018-02-20 PROCEDURE — 97530 THERAPEUTIC ACTIVITIES: CPT | Mod: GP | Performed by: PHYSICAL THERAPIST

## 2018-02-20 PROCEDURE — 97012 MECHANICAL TRACTION THERAPY: CPT | Mod: GP | Performed by: PHYSICAL THERAPIST

## 2018-02-27 ENCOUNTER — THERAPY VISIT (OUTPATIENT)
Dept: PHYSICAL THERAPY | Facility: CLINIC | Age: 48
End: 2018-02-27
Payer: OTHER MISCELLANEOUS

## 2018-02-27 DIAGNOSIS — M54.50 BILATERAL LOW BACK PAIN WITHOUT SCIATICA: ICD-10-CM

## 2018-02-27 PROCEDURE — 97012 MECHANICAL TRACTION THERAPY: CPT | Mod: GP | Performed by: PHYSICAL THERAPIST

## 2018-02-27 PROCEDURE — 97530 THERAPEUTIC ACTIVITIES: CPT | Mod: GP | Performed by: PHYSICAL THERAPIST

## 2018-03-06 ENCOUNTER — THERAPY VISIT (OUTPATIENT)
Dept: PHYSICAL THERAPY | Facility: CLINIC | Age: 48
End: 2018-03-06
Payer: OTHER MISCELLANEOUS

## 2018-03-06 DIAGNOSIS — M54.50 BILATERAL LOW BACK PAIN WITHOUT SCIATICA: ICD-10-CM

## 2018-03-06 PROCEDURE — 97012 MECHANICAL TRACTION THERAPY: CPT | Mod: GP | Performed by: PHYSICAL THERAPIST

## 2018-03-06 PROCEDURE — 97530 THERAPEUTIC ACTIVITIES: CPT | Mod: GP | Performed by: PHYSICAL THERAPIST

## 2018-03-13 ENCOUNTER — THERAPY VISIT (OUTPATIENT)
Dept: PHYSICAL THERAPY | Facility: CLINIC | Age: 48
End: 2018-03-13
Payer: OTHER MISCELLANEOUS

## 2018-03-13 DIAGNOSIS — M54.50 BILATERAL LOW BACK PAIN WITHOUT SCIATICA: ICD-10-CM

## 2018-03-13 PROCEDURE — 97012 MECHANICAL TRACTION THERAPY: CPT | Mod: GP | Performed by: PHYSICAL THERAPIST

## 2018-03-13 PROCEDURE — 97530 THERAPEUTIC ACTIVITIES: CPT | Mod: GP | Performed by: PHYSICAL THERAPIST

## 2018-03-13 NOTE — MR AVS SNAPSHOT
"              After Visit Summary   3/13/2018    Valeriano Rendon    MRN: [id number]           Patient Information     Date Of Birth          1970        Visit Information        Provider Department      3/13/2018 1:50 PM Ron Lund PT [facility]        Today's Diagnoses     Bilateral low back pain without sciatica           Follow-ups after your visit        Who to contact     If you have questions or need follow up information about today's clinic visit or your schedule please contact [facility] directly at [phone].  Normal or non-critical lab and imaging results will be communicated to you by eTippinghart, letter or phone within 4 business days after the clinic has received the results. If you do not hear from us within 7 days, please contact the clinic through Globitelt or phone. If you have a critical or abnormal lab result, we will notify you by phone as soon as possible.  Submit refill requests through [email or web address] or call your pharmacy and they will forward the refill request to us. Please allow 3 business days for your refill to be completed.          Additional Information About Your Visit        MyChart Information     [email or web address] lets you send messages to your doctor, view your test results, renew your prescriptions, schedule appointments and more. To sign up, go to [email or web address] . Click on \"Log in\" on the left side of the screen, which will take you to the Welcome page. Then click on \"Sign up Now\" on the right side of the page.     You will be asked to enter the access code listed below, as well as some personal information. Please follow the directions to create your username and password.     Your access code is: [email or web address]  Expires: 2018  3:42 PM     Your access code will  in 90 days. If you need help or a new code, please call your [facility] clinic or [phone].        Care EveryWhere ID     This is " your Care EveryWhere ID. This could be used by other organizations to access your [facility] medical records  [id number]         Blood Pressure from Last 3 Encounters:   11/08/17 119/79   10/30/17 116/82   08/07/17 120/76    Weight from Last 3 Encounters:   11/08/17 74.8 kg (165 lb)   10/30/17 76 kg (167 lb 8 oz)   08/07/17 72.7 kg (160 lb 3.2 oz)              We Performed the Following     CONNIE Progress Notes Report     Mechanical Traction Therapy     Therapeutic Activities        Primary Care Provider Office Phone # Fax #    Cosme Nando Parham [phone]       [address] [facility] [address]  [facility] [address]        Equal Access to Services     EDGAR ALVAREZ : Hadii anne-marie ku hadasho Sooctavio, waaxda luqadaha, qaybta kaalmada adeegyada, waxay mayurin rebeka cazares . So [facility] [phone].    ATENCIÓN: Si habla español, tiene a jean-baptiste disposición servicios gratuitos de asistencia lingüística. [facility] [phone].    We comply with applicable federal civil rights laws and [place] laws. We do not discriminate on the basis of race, color, national origin, age, disability, sex, sexual orientation, or gender identity.            Thank you!     Thank you for choosing [facility] [place]  for your care. Our goal is always to provide you with excellent care. Hearing back from our patients is one way we can continue to improve our services. Please take a few minutes to complete the written survey that you may receive in the mail after your visit with us. Thank you!             Your Updated Medication List - Protect others around you: Learn how to safely use, store and throw away your medicines at www.disposemymeds.org.          This list is accurate as of 3/13/18  2:37 PM.  Always use your most recent med list.                   Brand Name Dispense Instructions for use Diagnosis    ibuprofen 800 MG tablet    ADVIL/MOTRIN    30 tablet    Take 1 tablet (800 mg) by mouth every 8  hours as needed for moderate pain    Acute bilateral low back pain without sciatica       methocarbamol 500 MG tablet    ROBAXIN    30 tablet    Take 2 tablets (1,000 mg) by mouth 3 times daily as needed for muscle spasms    Back muscle spasm

## 2018-03-13 NOTE — PROGRESS NOTES
Subjective:  HPI                    Objective:  System    Physical Exam    General     ROS    Assessment/Plan:    PROGRESS  REPORT    Overall impression:  Patient has shown mild improvements with PT focusing on strengthening, ROM, and lumbar mechanical traction.  Pt has plateaued with PT at this time and I recommend he follows up with referring MD for further care as the patient is still having pain that is preventing him from returning to all physical activities.      SUBJECTIVE  Pt reports he feels a little better recently.  He still feels pain with bending forward and back.  His walking has felt off like something is out of place, but since he got a pop out of his back the last week its felt a little better.  Pt still feels he can't do anything too physical without flaring things.    OBJECTIVE    Amb: Pt amb without AD with increased L rotation  Lumbar AROM:   Flex 75%, +   Ext 100%, +   Rot WNL   SB R+, L WNL    Squat WNL  SL Squat L leg unsteadiness and low back pain    Strength: mild hip abductor weakness      ASSESSMENT/PLAN  Updated problem list and treatment plan: Diagnosis 1:  LBP  Pain -  hot/cold therapy  Decreased ROM/flexibility - manual therapy and therapeutic exercise  Decreased joint mobility - manual therapy and therapeutic exercise  Decreased strength - therapeutic exercise and therapeutic activities  Impaired balance - neuro re-education and therapeutic activities  Decreased proprioception - neuro re-education and therapeutic activities  Inflammation - cold therapy  Impaired gait - gait training  Impaired muscle performance - neuro re-education  Decreased function - therapeutic activities  Impaired posture - neuro re-education  STG/LTGs have been met or progress has been made towards goals:  Yes (See Goal flow sheet completed today.)  Assessment of Progress: The patient's condition is improving.  Self Management Plans:  Patient has been instructed in a home treatment program.  I have re-evaluated  this patient and find that the nature, scope, duration and intensity of the therapy is appropriate for the medical condition of the patient.  Valeriano continues to require the following intervention to meet STG and LTG's:  PT    Recommendations:  This patient is ready to be discharged from therapy and continue their home treatment program.    Please refer to the daily flowsheet for treatment today, total treatment time and time spent performing 1:1 timed codes.

## 2018-04-30 ENCOUNTER — OFFICE VISIT (OUTPATIENT)
Dept: NEUROSURGERY | Facility: CLINIC | Age: 48
End: 2018-04-30
Attending: NEUROLOGICAL SURGERY
Payer: OTHER MISCELLANEOUS

## 2018-04-30 VITALS
OXYGEN SATURATION: 98 % | HEART RATE: 101 BPM | TEMPERATURE: 98.6 F | SYSTOLIC BLOOD PRESSURE: 125 MMHG | DIASTOLIC BLOOD PRESSURE: 77 MMHG

## 2018-04-30 DIAGNOSIS — M47.816 LUMBAR SPONDYLOSIS: Primary | ICD-10-CM

## 2018-04-30 PROCEDURE — 99213 OFFICE O/P EST LOW 20 MIN: CPT | Performed by: NEUROLOGICAL SURGERY

## 2018-04-30 PROCEDURE — G0463 HOSPITAL OUTPT CLINIC VISIT: HCPCS | Performed by: NEUROLOGICAL SURGERY

## 2018-04-30 ASSESSMENT — PAIN SCALES - GENERAL: PAINLEVEL: MODERATE PAIN (4)
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
## 2018-04-30 NOTE — PATIENT INSTRUCTIONS
1. Referral to [facility] for facet rhizotomy.     2. Please call our clinic with any questions or concerns: [phone]

## 2018-04-30 NOTE — NURSING NOTE
"Valeriano Rendon is a 47 year old male who presents for:  Chief Complaint   Patient presents with     Neurologic Problem     follow up lumbar radiculopathy- discuss surgical options        Initial Vitals:  There were no vitals taken for this visit. Estimated body mass index is 27.46 kg/(m^2) as calculated from the following:    Height as of 11/8/17: 5' 5\" (1.651 m).    Weight as of 11/8/17: 165 lb (74.8 kg).. There is no height or weight on file to calculate BSA. BP completed using cuff size: regular  Data Unavailable    Do you feel safe in your environment?  Yes  Do you need any refills today? No    Nursing Comments: follow up lumbar radiculopathy- discuss surgical options.  Patient rates his pain today as 4        Ella Hernandez CMA, AAS      Discharge plan:   See providers dictation   Ella Hernandez CMA, AAS       "

## 2018-04-30 NOTE — MR AVS SNAPSHOT
After Visit Summary   4/30/2018    Valeriano Rendon    MRN: [id number]           Patient Information     Date Of Birth          1970        Visit Information        Provider Department      4/30/2018 10:40 AM Elio Vaca MD [facility] Neurosurgery [facility]        Today's Diagnoses     Lumbar radiculopathy    -  1      Care Instructions    1. Referral to Dr. Killian for facet rhizotomy. You can call to schedule: [phone]     2. Please call our clinic with any questions or concerns: [phone]            Follow-ups after your visit        Additional Services     PHYSIATRY REFERRAL       Your provider has referred you to: Buck Killian MD - [place] [phone]   [email or web address]  Reason: facet rhizotomy    Please note these locations do not prescribe narcotics or manage chronic pain.    Please be aware that coverage of these services is subject to the terms and limitations of your health insurance plan.  Call member services at your health plan with any benefit or coverage questions.      Please bring the following to your appointment:  >>   Any x-rays, CTs or MRIs which have been performed.  Contact the facility where they were done to arrange for  prior to your scheduled appointment.    >>   List of current medications   >>   This referral request   >>   Any documents/labs given to you for this referral                  Who to contact     If you have questions or need follow up information about today's clinic visit or your schedule please contact [facility] directly at [phone].  Normal or non-critical lab and imaging results will be communicated to you by MyChart, letter or phone within 4 business days after the clinic has received the results. If you do not hear from us within 7 days, please contact the clinic through MyChart or phone. If you have a critical or abnormal lab result, we will notify you by  "phone as soon as possible.  Submit refill requests through Primitive Makeup or call your pharmacy and they will forward the refill request to us. Please allow 3 business days for your refill to be completed.          Additional Information About Your Visit        iProcureharANDA Networks Information     Primitive Makeup lets you send messages to your doctor, view your test results, renew your prescriptions, schedule appointments and more. To sign up, go to [email or web address] . Click on \"Log in\" on the left side of the screen, which will take you to the Welcome page. Then click on \"Sign up Now\" on the right side of the page.     You will be asked to enter the access code listed below, as well as some personal information. Please follow the directions to create your username and password.     Your access code is: [email or web address]  Expires: 2018  3:42 PM     Your access code will  in 90 days. If you need help or a new code, please call your [place] clinic or [phone].        Care EveryWhere ID     This is your Care EveryWhere ID. This could be used by other organizations to access your [place] medical records  [id number]        Your Vitals Were     Pulse Temperature Pulse Oximetry             101 98.6  F (37  C) (Oral) 98%          Blood Pressure from Last 3 Encounters:   18 125/77   17 119/79   10/30/17 116/82    Weight from Last 3 Encounters:   17 165 lb (74.8 kg)   10/30/17 167 lb 8 oz (76 kg)   17 160 lb 3.2 oz (72.7 kg)              We Performed the Following     PHYSIATRY REFERRAL        Primary Care Provider Office Phone # Fax #    Cosme Parham [phone]       [address] [address]        Equal Access to Services     EDGAR ALVAREZ : Mireille Dodson, watorstenda mónica, qaybta kaalrichie bennett, nam gonzalez. So [facility] [phone].    ATENCIÓN: Si habla español, tiene a jean-baptiste disposición servicios gratuitos de " asistencia lingüística. Kindra al [phone].    We comply with applicable federal civil rights laws and [place] laws. We do not discriminate on the basis of race, color, national origin, age, disability, sex, sexual orientation, or gender identity.            Thank you!     Thank you for choosing [facility]INIC  for your care. Our goal is always to provide you with excellent care. Hearing back from our patients is one way we can continue to improve our services. Please take a few minutes to complete the written survey that you may receive in the mail after your visit with us. Thank you!             Your Updated Medication List - Protect others around you: Learn how to safely use, store and throw away your medicines at www.disposemymeds.org.          This list is accurate as of 4/30/18 11:15 AM.  Always use your most recent med list.                   Brand Name Dispense Instructions for use Diagnosis    ibuprofen 800 MG tablet    ADVIL/MOTRIN    30 tablet    Take 1 tablet (800 mg) by mouth every 8 hours as needed for moderate pain    Acute bilateral low back pain without sciatica       methocarbamol 500 MG tablet    ROBAXIN    30 tablet    Take 2 tablets (1,000 mg) by mouth 3 times daily as needed for muscle spasms    Back muscle spasm

## 2018-05-07 ENCOUNTER — TELEPHONE (OUTPATIENT)
Dept: FAMILY MEDICINE | Facility: CLINIC | Age: 48
End: 2018-05-07

## 2018-05-07 NOTE — TELEPHONE ENCOUNTER
Dr. Parham,    Patient is wondering your advise on a procedure that he is considering for his chronic lower back pain acquired from an injury.    Would prefer to talk to you if possible. Best # to reach pt is [phone], best between 3-5pm, but anytime is okay.    Pt has done PT and traction which, but is looking for a more permanent solution.    Considering Rhizotomy. Has worker's comp, so is concerned about the procedure being covered in the future. Pt has been told nerves will regenerate and often procedure needs to be repeated in 6 months. Is the information correct? Doesn't mind the idea of going back, as long as it is covered.     Pt saw neurosurgery, Dr. Vaca at [facility] who referred him to Dr. Buck Killian whose clinic no longer does those procedures. They referred him to [facility], and he is sceptical about this clinic and whether they are reputable.    Would like your thoughts and recommendation.    Rosina Arenas RN  [facility]

## 2018-05-07 NOTE — TELEPHONE ENCOUNTER
I called .patient and answered his questions, reviewed Rhizotomy, recommended getting a second opinion  i.e. [facility] or [facility] ( he would see who was covered)

## 2018-05-07 NOTE — TELEPHONE ENCOUNTER
Reason for call:  Other   Patient called regarding (reason for call): call back  Additional comments: Pt would like Dr. Parham to call him back sometime today when he has a moment. He has some questions regarding a procedure he is thinking about having done, stemming from a back injury he has which Dr. Parham has treated him for in the past.    Phone number to reach patient:  Home number on file [phone] (home)    Best Time:  Any    Can we leave a detailed message on this number?  YES

## 2021-03-12 ENCOUNTER — IMMUNIZATION (OUTPATIENT)
Dept: NURSING | Facility: CLINIC | Age: 51
End: 2021-03-12
Payer: OTHER GOVERNMENT

## 2021-03-12 PROCEDURE — 0001A PR COVID VAC PFIZER DIL RECON 30 MCG/0.3 ML IM: CPT

## 2021-03-12 PROCEDURE — 91300 PR COVID VAC PFIZER DIL RECON 30 MCG/0.3 ML IM: CPT

## 2021-03-20 ENCOUNTER — HEALTH MAINTENANCE LETTER (OUTPATIENT)
Age: 51
End: 2021-03-20

## 2021-04-02 ENCOUNTER — IMMUNIZATION (OUTPATIENT)
Dept: NURSING | Facility: CLINIC | Age: 51
End: 2021-04-02
Attending: INTERNAL MEDICINE
Payer: OTHER GOVERNMENT

## 2021-04-02 PROCEDURE — 0002A PR COVID VAC PFIZER DIL RECON 30 MCG/0.3 ML IM: CPT

## 2021-04-02 PROCEDURE — 91300 PR COVID VAC PFIZER DIL RECON 30 MCG/0.3 ML IM: CPT

## 2021-09-04 ENCOUNTER — HEALTH MAINTENANCE LETTER (OUTPATIENT)
Age: 51
End: 2021-09-04

## 2022-04-16 ENCOUNTER — HEALTH MAINTENANCE LETTER (OUTPATIENT)
Age: 52
End: 2022-04-16

## 2022-10-16 ENCOUNTER — HEALTH MAINTENANCE LETTER (OUTPATIENT)
Age: 52
End: 2022-10-16

## 2023-06-01 ENCOUNTER — HEALTH MAINTENANCE LETTER (OUTPATIENT)
Age: 53
End: 2023-06-01

## 2024-12-16 ENCOUNTER — OFFICE VISIT (OUTPATIENT)
Dept: FAMILY MEDICINE | Facility: CLINIC | Age: 54
End: 2024-12-16
Payer: MEDICAID

## 2024-12-16 VITALS
HEART RATE: 84 BPM | OXYGEN SATURATION: 98 % | SYSTOLIC BLOOD PRESSURE: 103 MMHG | RESPIRATION RATE: 16 BRPM | DIASTOLIC BLOOD PRESSURE: 68 MMHG | BODY MASS INDEX: 27.07 KG/M2 | TEMPERATURE: 98 F | WEIGHT: 162.5 LBS | HEIGHT: 65 IN

## 2024-12-16 DIAGNOSIS — M25.641 STIFFNESS OF JOINTS OF BOTH HANDS: ICD-10-CM

## 2024-12-16 DIAGNOSIS — Z12.11 SCREENING FOR COLON CANCER: ICD-10-CM

## 2024-12-16 DIAGNOSIS — K40.90 INDIRECT RIGHT INGUINAL HERNIA: ICD-10-CM

## 2024-12-16 DIAGNOSIS — M79.18 MYALGIA, MULTIPLE SITES: Primary | ICD-10-CM

## 2024-12-16 DIAGNOSIS — S16.1XXD STRAIN OF NECK MUSCLE, SUBSEQUENT ENCOUNTER: ICD-10-CM

## 2024-12-16 DIAGNOSIS — B35.3 TINEA PEDIS OF BOTH FEET: ICD-10-CM

## 2024-12-16 DIAGNOSIS — M25.642 STIFFNESS OF JOINTS OF BOTH HANDS: ICD-10-CM

## 2024-12-16 DIAGNOSIS — Z12.5 SCREENING FOR PROSTATE CANCER: ICD-10-CM

## 2024-12-16 DIAGNOSIS — Z13.1 SCREENING FOR DIABETES MELLITUS: ICD-10-CM

## 2024-12-16 LAB
ALBUMIN SERPL BCG-MCNC: 4.1 G/DL (ref 3.5–5.2)
ALP SERPL-CCNC: 106 U/L (ref 40–150)
ALT SERPL W P-5'-P-CCNC: 13 U/L (ref 0–70)
ANION GAP SERPL CALCULATED.3IONS-SCNC: 8 MMOL/L (ref 7–15)
AST SERPL W P-5'-P-CCNC: 19 U/L (ref 0–45)
BILIRUB SERPL-MCNC: 0.2 MG/DL
BUN SERPL-MCNC: 20.1 MG/DL (ref 6–20)
CALCIUM SERPL-MCNC: 9.3 MG/DL (ref 8.8–10.4)
CHLORIDE SERPL-SCNC: 102 MMOL/L (ref 98–107)
CK SERPL-CCNC: 204 U/L (ref 39–308)
CREAT SERPL-MCNC: 0.94 MG/DL (ref 0.67–1.17)
CRP SERPL-MCNC: 18.6 MG/L
EGFRCR SERPLBLD CKD-EPI 2021: >90 ML/MIN/1.73M2
EST. AVERAGE GLUCOSE BLD GHB EST-MCNC: 120 MG/DL
GLUCOSE SERPL-MCNC: 110 MG/DL (ref 70–99)
HBA1C MFR BLD: 5.8 % (ref 0–5.6)
HCO3 SERPL-SCNC: 27 MMOL/L (ref 22–29)
POTASSIUM SERPL-SCNC: 4.5 MMOL/L (ref 3.4–5.3)
PROT SERPL-MCNC: 7.7 G/DL (ref 6.4–8.3)
PSA SERPL DL<=0.01 NG/ML-MCNC: 0.71 NG/ML (ref 0–3.5)
SODIUM SERPL-SCNC: 137 MMOL/L (ref 135–145)
TSH SERPL DL<=0.005 MIU/L-ACNC: 1.34 UIU/ML (ref 0.3–4.2)
URATE SERPL-MCNC: 4.7 MG/DL (ref 3.4–7)
VIT D+METAB SERPL-MCNC: 15 NG/ML (ref 20–50)

## 2024-12-16 PROCEDURE — G2211 COMPLEX E/M VISIT ADD ON: HCPCS | Performed by: FAMILY MEDICINE

## 2024-12-16 PROCEDURE — G0103 PSA SCREENING: HCPCS | Performed by: FAMILY MEDICINE

## 2024-12-16 PROCEDURE — 83036 HEMOGLOBIN GLYCOSYLATED A1C: CPT | Performed by: FAMILY MEDICINE

## 2024-12-16 PROCEDURE — 99214 OFFICE O/P EST MOD 30 MIN: CPT | Performed by: FAMILY MEDICINE

## 2024-12-16 PROCEDURE — 86431 RHEUMATOID FACTOR QUANT: CPT | Performed by: FAMILY MEDICINE

## 2024-12-16 PROCEDURE — 80053 COMPREHEN METABOLIC PANEL: CPT | Performed by: FAMILY MEDICINE

## 2024-12-16 PROCEDURE — 84443 ASSAY THYROID STIM HORMONE: CPT | Performed by: FAMILY MEDICINE

## 2024-12-16 PROCEDURE — 86618 LYME DISEASE ANTIBODY: CPT | Performed by: FAMILY MEDICINE

## 2024-12-16 PROCEDURE — 84550 ASSAY OF BLOOD/URIC ACID: CPT | Performed by: FAMILY MEDICINE

## 2024-12-16 PROCEDURE — 82550 ASSAY OF CK (CPK): CPT | Performed by: FAMILY MEDICINE

## 2024-12-16 PROCEDURE — 36415 COLL VENOUS BLD VENIPUNCTURE: CPT | Performed by: FAMILY MEDICINE

## 2024-12-16 PROCEDURE — 86140 C-REACTIVE PROTEIN: CPT | Performed by: FAMILY MEDICINE

## 2024-12-16 PROCEDURE — 82306 VITAMIN D 25 HYDROXY: CPT | Performed by: FAMILY MEDICINE

## 2024-12-16 RX ORDER — KETOCONAZOLE 20 MG/G
CREAM TOPICAL DAILY
Qty: 60 G | Refills: 1 | Status: SHIPPED | OUTPATIENT
Start: 2024-12-16

## 2024-12-16 ASSESSMENT — PAIN SCALES - GENERAL: PAINLEVEL_OUTOF10: SEVERE PAIN (6)

## 2024-12-16 NOTE — PROGRESS NOTES
"  Assessment & Plan   Has multiple concerns' will schedule physical and do that on the next month    Myalgia, multiple sites  For 3-4 years   Worse especially / shoulder / hands no joint swelling but very stiff/ painful  - Vitamin D Deficiency; Future  - CK total; Future  - CRP, inflammation; Future  - TSH with free T4 reflex; Future  - Comprehensive metabolic panel (BMP + Alb, Alk Phos, ALT, AST, Total. Bili, TP); Future  - Physical Therapy  Referral; Future  - Occupational Therapy  Referral; Future    Stiffness of joints of both hands  Reduced ROM   - Vitamin D Deficiency; Future  - CK total; Future  - CRP, inflammation; Future  - Physical Therapy  Referral; Future  - Occupational Therapy  Referral; Future    Strain of neck muscle, subsequent encounter  Worse   - CRP, inflammation; Future  - Physical Therapy  Referral; Future  - Occupational Therapy  Referral; Future    Indirect right inguinal hernia  enlarging  - Adult Gen Surg  Referral; Future    Screening for colon cancer  Will do  - Colonoscopy Screening  Referral; Future    Screening for prostate cancer  sent  - PSA, screen; Future    Screening for diabetes mellitus  sent    Tinea pedis of both feet  Use smartwool  - Hemoglobin A1c; Future  - ketoconazole (NIZORAL) 2 % external cream; Apply topically daily.          Nicotine/Tobacco Cessation  He reports that he has been smoking. He has quit using smokeless tobacco.  Nicotine/Tobacco Cessation Plan  Information offered: Patient not interested at this time      BMI  Estimated body mass index is 27.41 kg/m  as calculated from the following:    Height as of this encounter: 1.64 m (5' 4.57\").    Weight as of this encounter: 73.7 kg (162 lb 8 oz).         Regular exercise  See Patient Instructions    Roxanna Cisneros is a 54 year old, presenting for the following health issues:  Arthritis, Neck Pain, Shoulder Pain, and Wrist Pain      " "12/16/2024     9:00 AM   Additional Questions   Roomed by loula   Accompanied by self     Arthritis    Shoulder Pain    History of Present Illness       Reason for visit:  Neck and shoulder pain   He is taking medications regularly.           Rash  Onset/Duration: months  Description  Location: toe web/ groin  Character: round, blotchy  Itching: mild  Intensity:  mild  Progression of Symptoms:  worsening  Accompanying signs and symptoms:   Fever: No    Sore throat symptoms: No  Recent cold symptoms: No  History:           Previous episodes of similar rash: athletes foot  New exposures:  None  Recent travel: No  Exposure to similar rash: No      Concern - joint pain  Onset: 3-4 years  Description: stiff/ pain shoulders/ hands/ neck  Intensity: moderate  Progression of Symptoms:  worsening and waxing and waning    Previous history of similar problem: yes  Precipitating factors:        Worsened by: certain activity  Alleviating factors:        Improved by: warmth  Therapies tried and outcome:  none   The longitudinal plan of care for the diagnosis(es)/condition(s) as documented were addressed during this visit. Due to the added complexity in care, I will continue to support Blayne in the subsequent management and with ongoing continuity of care.     Review of Systems  Constitutional, HEENT, cardiovascular, pulmonary, gi and gu systems are negative, except as otherwise noted.      Objective    /68   Pulse 84   Temp 98  F (36.7  C) (Temporal)   Resp 16   Ht 1.64 m (5' 4.57\")   Wt 73.7 kg (162 lb 8 oz)   SpO2 98%   BMI 27.41 kg/m    Body mass index is 27.41 kg/m .  Physical Exam   GENERAL: alert and no distress  EYES: Eyes grossly normal to inspection, PERRL and conjunctivae and sclerae normal  HENT: ear canals and TM's normal, nose and mouth without ulcers or lesions  NECK: no adenopathy, no asymmetry, masses, or scars  RESP: lungs clear to auscultation - no rales, rhonchi or wheezes  CV: regular rate and " rhythm, normal S1 S2, no S3 or S4, no murmur, click or rub, no peripheral edema  ABDOMEN: soft, nontender, no hepatosplenomegaly, soft reducible right groin mass and bowel sounds normal  MS: normal muscle tone and tenderness to palpation hands diffusely  Neck exam: tenderness over lower cervical spine and nuchal area, tenderness over trapezial muscles, normal neurological exam of arms; normal DTR's, motor, sensory exam.  X-Ray: not indicated.  SKIN: maculopapular eruption - groin and toe webs  NEURO: Normal strength and tone, mentation intact and speech normal  PSYCH: mentation appears normal, affect normal/bright            Signed Electronically by: Cosme Parham MD

## 2024-12-17 LAB
B BURGDOR IGG+IGM SER QL: 0.02
RHEUMATOID FACT SERPL-ACNC: <10 IU/ML

## 2024-12-17 NOTE — PROGRESS NOTES
PHYSICAL THERAPY EVALUATION  Type of Visit: Evaluation              Subjective         Presenting condition or subjective complaint: (Patient-Rptd) neck and shoulder pain  Date of onset: 12/16/24 (PT referral date)    Relevant medical history:     Dates & types of surgery:      Prior diagnostic imaging/testing results:       Prior therapy history for the same diagnosis, illness or injury: (Patient-Rptd) No      Living Environment  Social support: (Patient-Rptd) With a significant other or spouse   Type of home: (Patient-Rptd) House   Stairs to enter the home: (Patient-Rptd) Yes       Ramp: (Patient-Rptd) No   Stairs inside the home: (Patient-Rptd) Yes (Patient-Rptd) 3 Is there a railing: (Patient-Rptd) No     Help at home: (Patient-Rptd) None  Equipment owned:       Employment:      Hobbies/Interests: (Patient-Rptd) golf hiking camping swimming biking    Patient goals for therapy: (Patient-Rptd) everything    Valeriano Rendon is a 54 year old male with a neck condition. Mechanism of injury: Chronic pain and stiffness in neck, shoulders and hands. Patient also has hand therapy referral. Where: (home, work, MVA, community, recreation/sport, unknown, other): NA. Location of symptoms: left neck, left upper trap, left lateral shoulder and upper arm, occasional pain in forearm and middle finger. Pain level on number scale: 5-7/10. Quality of pain: stabbing, dull, sharp. Associated symptoms: stiffness, numbness and tingling. Pain frequency (constant/intermittent): constant. Symptoms are exacerbated by: sleeping, lying prone, sitting, poor posture, driving. Symptoms are relieved by: heat, advil, being upright. Progression of symptoms since onset (same/better/worse): worse. Special tests (x-ray, MRI, CT scan, EMG, bone scan): none recently. Previous treatment: None recently. Improvement with previous treatment: NA. General health as reported by patient is good. Pertinent medical history includes:  see Epic. Medical  allergies includes: see Epic. Surgical history includes: see Epic. Current medications include: see Epic. Occupation: None. Patient is (working in normal job without restrictions/working in normal job with restrictions/working in an alternate job/not working due to present treatment problem): NA. Primary job tasks: NA. Barriers at home/work: None reported by patient. Red flags: None reported by patient.     Objective   Posture     Sitting (good/fair/poor):  poor  Standing (good/fair/poor):  fair  Protruded head (yes/no):  no  Wry neck (right/left/nil):  nil  Relevant wry neck (yes/no):  no  Correction of posture (better/worse/no effect): better    Neurological    Myotomes L R   C4 (shoulder elevation) 5/5 5/5   C5 (shoulder abduction) 5/5 5/5   C6 (elbow flexion) 5/5 5/5   C7 (elbow extension) 5/5 5/5   C8 (thumb extension) 5/5 5/5   T1 (finger add/abd) 5/5 5/5     Sensory Deficit, Reflexes, Dural Signs: lumbar dermatomes WNL    Cervical Movement Loss Cecil Mod Min Nil Pain   Protrusion    x    Flexion   x  +   Retraction   x  +   Extension   x  +   Rotation Left   x  +   Rotation Right   x  +   Lateral Flexion Right   x  +   Lateral Flexion Left   x  +     Assessment & Plan   CLINICAL IMPRESSIONS  Medical Diagnosis: Myalgia, multiple sites, Strain of neck muscle    Treatment Diagnosis: Myalgia, multiple sites, Strain of neck muscle   Impression/Assessment: Patient is a 54 year old male with cervical complaints.  The following significant findings have been identified: Pain, Decreased ROM/flexibility, Decreased joint mobility, Decreased strength, Impaired muscle performance, Decreased activity tolerance, and Impaired posture. These impairments interfere with their ability to perform self care tasks, recreational activities, household chores, and driving  as compared to previous level of function.     Clinical Decision Making (Complexity):  Clinical Presentation: Stable/Uncomplicated  Clinical Presentation Rationale:  based on medical and personal factors listed in PT evaluation  Clinical Decision Making (Complexity): Low complexity    PLAN OF CARE  Treatment Interventions:  Interventions: Manual Therapy, Neuromuscular Re-education, Therapeutic Activity, Therapeutic Exercise, Self-Care/Home Management    Long Term Goals     PT Goal 1  Goal Description: Patient will be able to drive with 0/10 pain.  Rationale: to maximize safety and independence with transportation  Target Date: 03/12/25      Frequency of Treatment: 1x per week  Duration of Treatment: for 4 weeks tapering down to 2x per month for 8 weeks    Recommended Referrals to Other Professionals:  None  Education Assessment:   Learner/Method: Patient;No Barriers to Learning    Risks and benefits of evaluation/treatment have been explained.   Patient/Family/caregiver agrees with Plan of Care.     Evaluation Time:     PT Eval, Low Complexity Minutes (97186): 15  Signing Clinician: Minh Cueva PT        [facility]                                                                                   OUTPATIENT PHYSICAL THERAPY      PLAN OF TREATMENT FOR OUTPATIENT REHABILITATION   Patient's Last Name, First Name, Valeriano Duron YOB: 1970   Provider's Name   [facility]   Medical Record No.  [id number]     Onset Date: 12/16/24 (PT referral date)  Start of Care Date: 12/18/24     Medical Diagnosis:  Myalgia, multiple sites, Strain of neck muscle      PT Treatment Diagnosis:  Myalgia, multiple sites, Strain of neck muscle Plan of Treatment  Frequency/Duration: 1x per week/ for 4 weeks tapering down to 2x per month for 8 weeks    Certification date from 12/18/24 to 03/12/25         See note for plan of treatment details and functional goals     Minh Cueva PT                         I CERTIFY THE NEED FOR THESE SERVICES FURNISHED UNDER        THIS PLAN OF TREATMENT AND WHILE UNDER MY  CARE     (Physician attestation of this document indicates review and certification of the therapy plan).              Referring Provider:  Cosme Parham    Initial Assessment  See Epic Evaluation- Start of Care Date: 12/18/24

## 2024-12-17 NOTE — ADDENDUM NOTE
Addended by: SUSSY FERRIS on: 12/17/2024 08:16 AM     Modules accepted: Orders     spontaneous rupture

## 2024-12-18 ENCOUNTER — THERAPY VISIT (OUTPATIENT)
Dept: PHYSICAL THERAPY | Facility: CLINIC | Age: 54
End: 2024-12-18
Attending: FAMILY MEDICINE
Payer: MEDICAID

## 2024-12-18 DIAGNOSIS — S16.1XXD STRAIN OF NECK MUSCLE, SUBSEQUENT ENCOUNTER: ICD-10-CM

## 2024-12-18 DIAGNOSIS — M79.18 MYALGIA, MULTIPLE SITES: ICD-10-CM

## 2024-12-18 DIAGNOSIS — M25.641 STIFFNESS OF JOINTS OF BOTH HANDS: ICD-10-CM

## 2024-12-18 DIAGNOSIS — M25.642 STIFFNESS OF JOINTS OF BOTH HANDS: ICD-10-CM

## 2024-12-18 PROBLEM — M54.50 BILATERAL LOW BACK PAIN WITHOUT SCIATICA: Status: RESOLVED | Noted: 2017-06-06 | Resolved: 2024-12-18

## 2024-12-18 PROCEDURE — 97110 THERAPEUTIC EXERCISES: CPT | Mod: GP | Performed by: PHYSICAL THERAPIST

## 2024-12-18 PROCEDURE — 97530 THERAPEUTIC ACTIVITIES: CPT | Mod: GP | Performed by: PHYSICAL THERAPIST

## 2024-12-18 PROCEDURE — 97161 PT EVAL LOW COMPLEX 20 MIN: CPT | Mod: GP | Performed by: PHYSICAL THERAPIST

## 2024-12-19 ENCOUNTER — LAB (OUTPATIENT)
Dept: LAB | Facility: CLINIC | Age: 54
End: 2024-12-19
Payer: MEDICAID

## 2024-12-19 ENCOUNTER — THERAPY VISIT (OUTPATIENT)
Dept: OCCUPATIONAL THERAPY | Facility: CLINIC | Age: 54
End: 2024-12-19
Payer: MEDICAID

## 2024-12-19 DIAGNOSIS — M25.641 STIFFNESS OF JOINTS OF BOTH HANDS: ICD-10-CM

## 2024-12-19 DIAGNOSIS — M25.642 STIFFNESS OF JOINTS OF BOTH HANDS: ICD-10-CM

## 2024-12-19 DIAGNOSIS — Z13.6 SCREENING FOR CARDIOVASCULAR CONDITION: Primary | ICD-10-CM

## 2024-12-19 DIAGNOSIS — M79.18 MYALGIA, MULTIPLE SITES: ICD-10-CM

## 2024-12-19 DIAGNOSIS — S16.1XXD STRAIN OF NECK MUSCLE, SUBSEQUENT ENCOUNTER: ICD-10-CM

## 2024-12-19 LAB
CHOLEST SERPL-MCNC: 270 MG/DL
FASTING STATUS PATIENT QL REPORTED: YES
HDLC SERPL-MCNC: 37 MG/DL
LDLC SERPL CALC-MCNC: 213 MG/DL
NONHDLC SERPL-MCNC: 233 MG/DL
TRIGL SERPL-MCNC: 102 MG/DL

## 2024-12-19 NOTE — PROGRESS NOTES
"OCCUPATIONAL THERAPY EVALUATION  Type of Visit: Evaluation              Subjective        Presenting condition or subjective complaint: (Patient-Rptd) neck and shoulder pain  Date of onset: 12/16/24 (Referral)    Relevant medical history:     Dates & types of surgery:      Patient comes to therapy today for evaluation and treatment of myalgias of multiple sites, stiffness of joints of both hands.  Endorses symptoms beginning in approximately May 2022, at that time with breathing difficulties and myalgias, extended well into June but never fully resolved. Recently with normal lyme and uric acid levels, CRP elevated, LILIA pending. He endorses stiffness more so than pain in the wrist, hands, and forearms. Also complains of associated weakness. Thus far has tried rest and stretch with little relief. Does tend to sleep on the stomach or side with the wrists \"kinked.\" Stopped working in 2022 when these symptoms began. 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     Prior diagnostic imaging/testing results:       Prior therapy history for the same diagnosis, illness or injury: (Patient-Rptd) No      Prior Level of Function  Transfers: Independent  Ambulation: Independent  ADL: Independent  IADL: Driving, Finances, Housekeeping, Laundry, Meal preparation, Medication management, Work, Yard work    Living Environment  Social support: (Patient-Rptd) With a significant other or spouse   Type of home: (Patient-Rptd) House   Stairs to enter the home: (Patient-Rptd) Yes       Ramp: (Patient-Rptd) No   Stairs inside the home: (Patient-Rptd) Yes (Patient-Rptd) 3 Is there a railing: (Patient-Rptd) No     Help at home: (Patient-Rptd) None  Equipment owned:       Employment:      Hobbies/Interests: (Patient-Rptd) golf hiking camping swimming biking    Patient goals for therapy: (Patient-Rptd) everything       Objective   ADDITIONAL HISTORY:  Right hand dominant  Patient reports symptoms of pain, stiffness/loss of motion, " weakness/loss of strength, and tingling (median distribution)  Transportation: drives  Currently not working.     Functional Outcome Measure:   Upper Extremity Functional Index Score:  SCORE:   Column Totals: /80: (Patient-Rptd) 44   (A lower score indicates greater disability.)    PAIN:  Pain Level at Rest: 0/10  Pain Level with Use: 4/10  Pain Location: Volar elbow, forearm and wrist. Mild and diffuse to the hands at times.   Pain Quality: Aching and Dull  Pain Frequency: intermittent  Pain is Worst: daytime or nighttime  Pain is Exacerbated By: Heavy gripping, lifting, home maintenance  Pain is Relieved By: rest and stretch  Pain Progression: Unchanged    POSTURE: Forward Neck Posture and Rounded Forward Shoulders     EDEMA: None     SENSATION: Decreased Median Nerve distribution per pt report, right-side only upon waking. Tends to resolve shortly after.     SCREENS:  Refer to physical therapy evaluation. Patient tends to complain of left-sided shoulder and neck pack regardless of movement plane at the cervical spine.       ROM:  AROM of bilateral elbow, FA, wrist, thumb and digits are grossly WNL bilaterally. Digit, wrist, and elbow flexion in particular are deliberate.     OBSERVATIONS/APPEARANCE:  Marked extrinsic extensor and flexor tightness, more severe on the left, more severe in flexors.       SPECIAL TESTS:   CTS Special Tests  Pain Report Left Right   Carpal Compression Test-Durkan Test (30 sec) Negative Negative   Queen Test for Lumbrical Incursion (fist x30 sec) Negative Negative   Tinel's at Carpal Tunnel Negative Negative   Phalen's Sign Negative Negative     Mild to moderate positive Finkelstein's and WHAT Test on the left, negative on the right.     NEURAL TENSION TESTING: MNT: Median Neurodynamic Test (based on CHRIS Franklin's ULNT)   12/19/2024   0-5 Scale NT   Position:   0/5: Arm across abdomen in coronal plane  1/5: Depress shoulder, ER to neutral ABD shoulder to 45 degrees  2/5: ER shoulder to  end range, keep elbow at 90 degrees  3/5: Extend elbow to 0 degrees  4/5: Fully supinate forearm  5/5: Extend wrist, fingers and thumb  Notes:  (+) indicates beyond grade level but less than half-way to next level  (-) indicates over half-way to level  S1 onset/change of patient's symptoms  S2 definite stop point based on patient's discomfort level    RESISTED TESTING:  Non-painful resisted bilateral biceps, brachialis, brachioradialis, FCR/FCU      STRENGTH:     Measured in pounds 12/19/2024 12/19/2024    Left Right   Trial 1 108# 117#     Lateral Pinch  Measured in pounds 12/19/2024 12/19/2024    Left Right   Trial 1 24# 24#     Assessment & Plan   CLINICAL IMPRESSIONS  Medical Diagnosis: Myalgia of multiple sites, stiffness of joints of both hands    Treatment Diagnosis: Myalgia of multiple sites, stiffness of joints of both hands    Impression/Assessment: Pt is a 54 year old male presenting to Occupational Therapy due to multiple sites, stiffness of joints of both hands.  The following significant findings have been identified: Impaired activity tolerance, Impaired coordination, Impaired ROM, Impaired sensation, Impaired strength, and Pain.  These identified deficits interfere with their ability to perform self care tasks, work tasks, recreational activities, household chores, driving , household mobility, medication management, financial management,  yard work, care of others, and meal planning and preparation as compared to previous level of function.   Patient's limitations or Problem List includes: Pain, Decreased ROM/motion, Weakness, Sensory disturbance, Hypomobility, Decreased , Decreased pinch, Decreased coordination, Decreased dexterity, and Tightness in musculature of the bilateral elbow, wrist, hand, thumb, index finger, long finger, ring finger, and small finger which interferes with the patient's ability to perform Self Care Tasks (dressing, eating, bathing, hygiene/toileting), Work Tasks,  Sleep Patterns, Recreational Activities, Household Chores, and Driving  as compared to previous level of function.    Clinical Decision Making (Complexity):  Assessment of Occupational Performance: 3-5 Performance Deficits  Occupational Performance Limitations: dressing, hygiene and grooming, care of others, communication management, driving and community mobility, health management and maintenance, home establishment and management, meal preparation and cleanup, shopping, sleep, work, leisure activities, and social participation  Clinical Decision Making (Complexity): Low complexity    PLAN OF CARE  Treatment Interventions:  Therapeutic Exercise:  AROM, AAROM, PROM, Tendon Gliding, Blocking, Reverse Blocking, Place and Hold, Contract Relax, Extensor Tracking, Isotonics, Isometrics, and Stabilization  Neuromuscular re-education:  Nerve Gliding, Coordination/Dexterity, Kinesthetic Training, Proprioceptive Training, Posture, Kinesiotaping, Strain Counter Strain, Isometrics, and Stabilization  Manual Techniques:  Coordination/Dexterity, Joint mobilization, Friction massage, Myofascial release, and Manual edema mobilization  Orthotic Fabrication:  Static, Finger based, Hand based, and Forearm based  Self Care:  Self Care Tasks, Ergonomic Considerations, and Work Tasks    Long Term Goals   OT Goal 1  Goal Identifier: Goal I  Goal Description: Patient will complete required ADL, IADL, sleep and leisure tasks with mild or less pain in the hands and wrist using activity modification principles, orthotics and AE as needed.  (0-2/10)  Rationale: In order to maximize safety and independence with performance of self-care activities;In order to maximize safety and independence with ADL/IADLs  Goal Progress: New  Target Date: 02/13/25      Frequency of Treatment: 1x/week  Duration of Treatment: 8 weeks     Education Assessment: Learner/Method: Patient;No Barriers to  Learning;Pictures/Video;Demonstration;Reading;Listening     Risks and benefits of evaluation/treatment have been explained.   Patient/Family/caregiver agrees with Plan of Care.     Evaluation Time:    OT Eval, Low Complexity Minutes (85559): 19    Signing Clinician: Petr Quesada OT        [facility]                                                                                   OUTPATIENT OCCUPATIONAL THERAPY      PLAN OF TREATMENT FOR OUTPATIENT REHABILITATION   Patient's Last Name, First Name, Valeriano Duron YOB: 1970   Provider's Name   [facility]   Medical Record No.  [id number]     Onset Date: 12/16/24 (Referral) Start of Care Date: 12/19/24     Medical Diagnosis:  Myalgia of multiple sites, stiffness of joints of both hands      OT Treatment Diagnosis:  Myalgia of multiple sites, stiffness of joints of both hands Plan of Treatment  Frequency/Duration:1x/week/8 weeks    Certification date from 12/19/24   To 02/13/25        See note for plan of treatment details and functional goals     Petr Quesada OT                         I CERTIFY THE NEED FOR THESE SERVICES FURNISHED UNDER        THIS PLAN OF TREATMENT AND WHILE UNDER MY CARE     (Physician attestation of this document indicates review and certification of the therapy plan).              Referring Provider:  Cosme Parham    Initial Assessment  See Epic Evaluation- 12/19/24

## 2024-12-22 ENCOUNTER — HEALTH MAINTENANCE LETTER (OUTPATIENT)
Age: 54
End: 2024-12-22

## 2024-12-26 ENCOUNTER — THERAPY VISIT (OUTPATIENT)
Dept: OCCUPATIONAL THERAPY | Facility: CLINIC | Age: 54
End: 2024-12-26
Payer: MEDICAID

## 2024-12-26 DIAGNOSIS — M25.641 STIFFNESS OF JOINTS OF BOTH HANDS: ICD-10-CM

## 2024-12-26 DIAGNOSIS — M79.18 MYALGIA, MULTIPLE SITES: Primary | ICD-10-CM

## 2024-12-26 DIAGNOSIS — M25.642 STIFFNESS OF JOINTS OF BOTH HANDS: ICD-10-CM

## 2024-12-31 NOTE — TELEPHONE ENCOUNTER
REFERRAL INFORMATION:  Referring Provider:  Cosme Parham MD   Referring Clinic:  [facility]   Reason for Visit/Diagnosis: K40.90 (ICD-10-CM) - Indirect right inguinal hernia        FUTURE VISIT INFORMATION:  Appointment Date: 1/9/25  Appointment Time:      NOTES RECORD STATUS  DETAILS   OFFICE NOTE from Referring Provider Internal 12/16/24   [facility]      Records Requested    Facility    Fax:    Outcome

## 2025-01-02 ENCOUNTER — THERAPY VISIT (OUTPATIENT)
Dept: OCCUPATIONAL THERAPY | Facility: CLINIC | Age: 55
End: 2025-01-02
Payer: MEDICAID

## 2025-01-02 DIAGNOSIS — M25.642 STIFFNESS OF JOINTS OF BOTH HANDS: ICD-10-CM

## 2025-01-02 DIAGNOSIS — M25.641 STIFFNESS OF JOINTS OF BOTH HANDS: ICD-10-CM

## 2025-01-02 DIAGNOSIS — M79.18 MYALGIA, MULTIPLE SITES: Primary | ICD-10-CM

## 2025-01-08 ENCOUNTER — HOSPITAL ENCOUNTER (OUTPATIENT)
Dept: GENERAL RADIOLOGY | Facility: CLINIC | Age: 55
Discharge: HOME | End: 2025-01-08
Attending: FAMILY MEDICINE
Payer: MEDICAID

## 2025-01-08 ENCOUNTER — OFFICE VISIT (OUTPATIENT)
Dept: FAMILY MEDICINE | Facility: CLINIC | Age: 55
End: 2025-01-08
Payer: MEDICAID

## 2025-01-08 VITALS
TEMPERATURE: 98.2 F | BODY MASS INDEX: 26.82 KG/M2 | WEIGHT: 161 LBS | HEIGHT: 65 IN | RESPIRATION RATE: 16 BRPM | HEART RATE: 78 BPM | DIASTOLIC BLOOD PRESSURE: 64 MMHG | OXYGEN SATURATION: 96 % | SYSTOLIC BLOOD PRESSURE: 106 MMHG

## 2025-01-08 DIAGNOSIS — R06.09 DOE (DYSPNEA ON EXERTION): ICD-10-CM

## 2025-01-08 DIAGNOSIS — G89.29 CHRONIC LEFT SHOULDER PAIN: ICD-10-CM

## 2025-01-08 DIAGNOSIS — Z87.891 HISTORY OF SMOKING 25-50 PACK YEARS: ICD-10-CM

## 2025-01-08 DIAGNOSIS — F17.200 NICOTINE DEPENDENCE, UNCOMPLICATED, UNSPECIFIED NICOTINE PRODUCT TYPE: ICD-10-CM

## 2025-01-08 DIAGNOSIS — M25.512 CHRONIC LEFT SHOULDER PAIN: ICD-10-CM

## 2025-01-08 DIAGNOSIS — E78.00 HYPERCHOLESTEREMIA: ICD-10-CM

## 2025-01-08 DIAGNOSIS — Z00.00 ROUTINE HISTORY AND PHYSICAL EXAMINATION OF ADULT: Primary | ICD-10-CM

## 2025-01-08 DIAGNOSIS — M79.18 MYALGIA, MULTIPLE SITES: ICD-10-CM

## 2025-01-08 PROCEDURE — 71046 X-RAY EXAM CHEST 2 VIEWS: CPT

## 2025-01-08 PROCEDURE — 99396 PREV VISIT EST AGE 40-64: CPT | Performed by: FAMILY MEDICINE

## 2025-01-08 PROCEDURE — 99213 OFFICE O/P EST LOW 20 MIN: CPT | Mod: 25 | Performed by: FAMILY MEDICINE

## 2025-01-08 PROCEDURE — 73030 X-RAY EXAM OF SHOULDER: CPT | Mod: LT

## 2025-01-08 SDOH — HEALTH STABILITY: PHYSICAL HEALTH: ON AVERAGE, HOW MANY MINUTES DO YOU ENGAGE IN EXERCISE AT THIS LEVEL?: 60 MIN

## 2025-01-08 SDOH — HEALTH STABILITY: PHYSICAL HEALTH: ON AVERAGE, HOW MANY DAYS PER WEEK DO YOU ENGAGE IN MODERATE TO STRENUOUS EXERCISE (LIKE A BRISK WALK)?: 5 DAYS

## 2025-01-08 ASSESSMENT — SOCIAL DETERMINANTS OF HEALTH (SDOH): HOW OFTEN DO YOU GET TOGETHER WITH FRIENDS OR RELATIVES?: ONCE A WEEK

## 2025-01-08 NOTE — PROGRESS NOTES
"Preventive Care Visit  [facility]GR[facility]  Cosme Parham MD, Family Medicine  Jan 8, 2025      Assessment & Plan     Routine history and physical examination of adult  Working on exercise' still limited by chest/ shoulder discomfort with deep inspiration    ZARAGOZA (dyspnea on exertion)  Intermittently   - XR Chest 2 Views; Future    Chronic left shoulder pain  Felt better after massaging \"Popping it\" this weakened  - XR Shoulder Left G/E 3 Views; Future    Myalgia, multiple sites  Better when down in [place]   Has started Vit D     History of smoking 25-50 pack years  Quit 4 year sago ( vapes nicotine now)  Needs screen  - CT Chest Lung Cancer Scrn Low Dose wo; Future    Nicotine dependence, uncomplicated, unspecified nicotine product type      Hypercholesterolemia  The 10-year ASCVD risk score (Salvador DK, et al., 2019) is: 13.2%    Values used to calculate the score:      Age: 54 years      Sex: Male      Is Non- : No      Diabetic: No      Tobacco smoker: Yes      Systolic Blood Pressure: 106 mmHg      Is BP treated: No      HDL Cholesterol: 37 mg/dL      Total Cholesterol: 270 mg/dL     Consider statin once testing done        Counseling  Appropriate preventive services were addressed with this patient via screening, questionnaire, or discussion as appropriate for fall prevention, nutrition, physical activity, Tobacco-use cessation, social engagement, weight loss and cognition.  Checklist reviewing preventive services available has been given to the patient.  Reviewed patient's diet, addressing concerns and/or questions.   The patient was instructed to see the dentist every 6 months.   He is at risk for psychosocial distress and has been provided with information to reduce risk.       Regular exercise    Roxanna Cisneros is a 54 year old, presenting for the following:.    Physical        1/8/2025     3:11 PM   Additional Questions   Roomed by Elda "          HPI          Health Care Directive  Patient does not have a Health Care Directive: Discussed advance care planning with patient; however, patient declined at this time.      1/8/2025   General Health   How would you rate your overall physical health? (!) FAIR   Feel stress (tense, anxious, or unable to sleep) Rather much   (!) STRESS CONCERN      1/8/2025   Nutrition   Three or more servings of calcium each day? Yes   Diet: Regular (no restrictions)   How many servings of fruit and vegetables per day? (!) 0-1   How many sweetened beverages each day? (!) 2         1/8/2025   Exercise   Days per week of moderate/strenuous exercise 5 days   Average minutes spent exercising at this level 60 min         1/8/2025   Social Factors   Frequency of gathering with friends or relatives Once a week   Worry food won't last until get money to buy more No   Food not last or not have enough money for food? No   Do you have housing? (Housing is defined as stable permanent housing and does not include staying outside in a car, in a tent, in an abandoned building, in an overnight shelter, or couch-surfing.) Yes   Are you worried about losing your housing? No   Lack of transportation? No   Unable to get utilities (heat,electricity)? No         1/8/2025   Fall Risk   Fallen 2 or more times in the past year? No   Trouble with walking or balance? Yes   Gait Speed Test (Document in seconds) 3.82   Gait Speed Test Interpretation Less than or equal to 5.00 seconds - PASS          1/8/2025   Dental   Dentist two times every year? (!) NO         1/8/2025   TB Screening   Were you born outside of the [place]? No         Today's PHQ-2 Score:       1/8/2025     2:53 PM   PHQ-2 ( 1999 [employer])   Q1: Little interest or pleasure in doing things 0   Q2: Feeling down, depressed or hopeless 0   PHQ-2 Score 0    Q1: Little interest or pleasure in doing things Not at all   Q2: Feeling down, depressed or hopeless Not at all   PHQ-2 Score 0        "Patient-reported           1/8/2025   Substance Use   If I could quit smoking, I would Completely agree   I want to quit smoking, worry about health affects Completely agree   Willing to make a plan to quit smoking Completely agree   Willing to cut down before quitting Completely agree   Alcohol more than 3/day or more than 7/wk Not Applicable   Do you use any other substances recreationally? (!) CANNABIS PRODUCTS     Social History     Tobacco Use    Smoking status: Every Day     Types: Vaping Device    Smokeless tobacco: Former    Tobacco comments:     Quiet cigarettes 4 years ago- uses nicotine vapes   Vaping Use    Vaping status: Every Day    Substances: Nicotine   Substance Use Topics    Alcohol use: No     Alcohol/week: 0.0 standard drinks of alcohol    Drug use: Yes     Comment: marijuana           1/8/2025   STI Screening   New sexual partner(s) since last STI/HIV test? No   ASCVD Risk   The 10-year ASCVD risk score (Salvador MARRERO, et al., 2019) is: 13.2%    Values used to calculate the score:      Age: 54 years      Sex: Male      Is Non- : No      Diabetic: No      Tobacco smoker: Yes      Systolic Blood Pressure: 106 mmHg      Is BP treated: No      HDL Cholesterol: 37 mg/dL      Total Cholesterol: 270 mg/dL           Reviewed and updated as needed this visit by Provider                    No past medical history on file.      Review of Systems  Constitutional, HEENT, cardiovascular, pulmonary, gi and gu systems are negative, except as otherwise noted.     Objective    Exam  /64 (BP Location: Left arm, Patient Position: Sitting, Cuff Size: Adult Regular)   Pulse 78   Temp 98.2  F (36.8  C) (Temporal)   Resp 16   Ht 1.642 m (5' 4.65\")   Wt 73 kg (161 lb)   SpO2 96%   BMI 27.09 kg/m     Estimated body mass index is 27.09 kg/m  as calculated from the following:    Height as of this encounter: 1.642 m (5' 4.65\").    Weight as of this encounter: 73 kg (161 " lb).    Physical Exam  GENERAL: alert and no distress  EYES: Eyes grossly normal to inspection, PERRL and conjunctivae and sclerae normal  HENT: ear canals and TM's normal, nose and mouth without ulcers or lesions  NECK: no adenopathy, no asymmetry, masses, or scars  RESP: lungs clear to auscultation - no rales, rhonchi or wheezes  CV: regular rate and rhythm, normal S1 S2, no S3 or S4, no murmur, click or rub, no peripheral edema  ABDOMEN: soft, nontender, no hepatosplenomegaly, no masses and bowel sounds normal  MS: normal muscle tone and tenderness to palpation left anterior shoudler  SKIN: no suspicious lesions or rashes  NEURO: Normal strength and tone, mentation intact and speech normal  PSYCH: mentation appears normal, affect normal/bright  LYMPH: no cervical, supraclavicular, axillary, or inguinal adenopathy  Diabetic foot exam: normal DP and PT pulses, no trophic changes or ulcerative lesions, and normal sensory exam    Reviewed labs from December    Signed Electronically by: Cosme Parham MD

## 2025-01-09 ENCOUNTER — OFFICE VISIT (OUTPATIENT)
Dept: SURGERY | Facility: CLINIC | Age: 55
End: 2025-01-09
Attending: FAMILY MEDICINE
Payer: MEDICAID

## 2025-01-09 ENCOUNTER — PRE VISIT (OUTPATIENT)
Dept: SURGERY | Facility: CLINIC | Age: 55
End: 2025-01-09

## 2025-01-09 VITALS
HEART RATE: 83 BPM | SYSTOLIC BLOOD PRESSURE: 108 MMHG | BODY MASS INDEX: 26.56 KG/M2 | OXYGEN SATURATION: 100 % | WEIGHT: 159.4 LBS | HEIGHT: 65 IN | DIASTOLIC BLOOD PRESSURE: 72 MMHG

## 2025-01-09 DIAGNOSIS — K40.20 BILATERAL INGUINAL HERNIA WITHOUT OBSTRUCTION OR GANGRENE, RECURRENCE NOT SPECIFIED: Primary | ICD-10-CM

## 2025-01-09 DIAGNOSIS — K42.9 UMBILICAL HERNIA WITHOUT OBSTRUCTION AND WITHOUT GANGRENE: ICD-10-CM

## 2025-01-09 RX ORDER — CEFAZOLIN SODIUM 2 G/50ML
2 SOLUTION INTRAVENOUS SEE ADMIN INSTRUCTIONS
OUTPATIENT
Start: 2025-01-09

## 2025-01-09 RX ORDER — CEFAZOLIN SODIUM 2 G/50ML
2 SOLUTION INTRAVENOUS
OUTPATIENT
Start: 2025-01-09

## 2025-01-09 ASSESSMENT — PAIN SCALES - GENERAL: PAINLEVEL_OUTOF10: MILD PAIN (3)

## 2025-01-09 NOTE — NURSING NOTE
"Chief Complaint   Patient presents with    New Patient     Indirect right inguinal hernia       Vitals:    01/09/25 1356   BP: 108/72   BP Location: Left arm   Patient Position: Sitting   Cuff Size: Adult Regular   Pulse: 83   SpO2: 100%   Weight: 72.3 kg (159 lb 6.4 oz)   Height: 1.638 m (5' 4.5\")       Body mass index is 26.94 kg/m .                          Alvin Sims EMT    "

## 2025-01-09 NOTE — NURSING NOTE
Pre and Post op Patient Education/Teaching Flowsheet  Relevant Diagnosis:  Inguinal Hernia (K40.90)  Teaching Topic:  Pre and post op teaching  Person(s) Involved in teaching:  Patient     Motivation Level:  Asks Questions:  Yes  Eager to Learn:  Yes  Cooperative:  Yes  Receptive (willing/able to accept information):  Yes  Any cultural factors/Yazidism beliefs that may influence understanding or compliance?  No    Patient/caregiver/family demonstrates understanding of the following:  Reason for the appointment, diagnosis, and treatment plan:  Yes  Patient demonstrates understanding of the following:  Pre-op bowel prep:  No  Post-op pain management recommendations (medications, ice compress, binder/athletic supporter (if applicable), etc.:  Yes  Inguinal hernia patients:  Post-op urinary retention- discussed signs/symptoms and visit to ER for Weeks catheter placement and to stay in place for at least 48 hours:  Yes  Restrictions:  Yes  Medications to take the day of surgery:  Per PCP  Blood thinner medications discussed and when to stop (if applicable):  Yes  Wound care:  Yes  Diabetes medication management (if applicable):  Per PCP  Which situations necessitate calling provider and whom to contact:  Discussed how to contact the hospital, nurse, and clinic scheduling staff if necessary    Infection Prevention: Patient demonstrates understanding of the following:  Patient instructed on hand hygiene:  Yes  Surgical procedure site care will be taught and will be reviewed at the time of discharge  Signs and symptoms of infection taught:  Yes  Wound care reviewed and will be taught at the time of discharge  Central venous catheter care will be taught at the time of discharge (if applicable)    Post-op follow-up:  Instructional materials used/given/mailed:  Surgical logistics, post op teaching sheet, and surgical scrub    Logistics:  Date and time of surgery:  TBD  Location of surgery: [facility]- 5th Floor  History and Physical and any other testing necessary prior to surgery:  Yes  Required time line for completion of History and Physical and any pre-op testing:  Yes  Discuss need for someone to drive patient home and stay with them for 24 hours:  Yes  Pre-op showering/scrub information with Surgical Scrub:  Yes  NPO Guidelines:  NPO per Anesthesia Guidelines

## 2025-01-09 NOTE — PATIENT INSTRUCTIONS
You met with Dr. Jerry Ruth.      Today's visit instructions:    Surgery Scheduling  Currently, there is a nationwide shortage of IV Fluids that is preventing us from scheduling any elective surgeries. We will call you to schedule as soon as this situation has been improved.      Reminder:  Surgery Requirements  Your surgery will be at [facility]- 5th Floor.  You will need to arrive 1.5  hours early.  You will need someone to drive you home (over 18 years old) and stay with you for 24 hours after the procedure.  You will need a preop physical with your regular doctor within 30 days of surgery- closer is always better.  Stop any blood thinners, vitamins, minerals, or herbal supplements 5 days before surgery.  If you are taking a prescribed blood thinner or weight loss medication please let us know for specific instructions.  Fasting- a nurse from [facility] will call you 1-2 days before surgery to confirm your procedure and tell you when to stop eating and drinking. If you had you preoperative physical with the Anesthesia Team/PAC, you do not get this call as it is discussed at the time of your visit.  Wash with the soap given/mailed from the clinic the night before surgery and morning of surgery. See instructions in the Surgery Packet.  If you would like a procedure estimate please call [facility] of [facility] at [phone] during the hours of 8 AM - 3 PM (option #2 for on-line request and option #3 for a representative).   Billing Customer Service:  [phone]       If you have questions please contact Glo RN or Ernestina RN during regular clinic hours, Monday through Friday 7:30 AM - 4:00 PM, or you can contact us via [employer] at anytime.       If you have urgent needs after-hours, weekends, or holidays please call the hospital at [phone] and ask to speak with our on-call General Surgery Team.    Appointment [phone]  Nurse Advice (Glo or Ernestina): [phone]    Surgery Scheduler (Roberta): [phone]  Billing Customer Service:  [phone]  Fax: [phone]    After Your Robotic Inguinal Hernia Repair and Open Umbilical Hernia        Incision care   Remove the bandage 1-2 days after surgery but leave the medical tape (Steri-Strips) or glue in place. These will loosen and fall off on their own 1-2 weeks after surgery.   You may take a shower the day after surgery. Carefully wash your incision with soap and water. Do not submerge yourself in water (bath, whirlpool, hot tub, pool, lake) for 14 days after surgery.     Always wash your hands before touching your incisions or removing bandages.   It is not unusual to form a collection of fluid or blood under your incision that may feel firm or squishy- it can take several weeks to months for your body to reabsorb it.  At times, it may even drain.  If that should happen keep the area clean with soap, water,  and cover with a clean gauze dressing. You can change this daily or as needed.  It is not unusual to develop swelling and/or bruising of the scrotum and penis and it can take several weeks or a month to improve.      Other medicines   Wait to start aspirin or blood thinners until the day after surgery. You can continue your regular medicines at your normal time the day after surgery.   Your pain medicine may cause constipation (hard, dry stools). To help with this, take the stool softener your doctor gave you or an over-the-counter stool softener or laxative. You can stop taking this when you are no longer taking pain medicine and your bowel movements are back to normal.      For pain or discomfort   Take the narcotic pain medicine your doctor gave you as needed and as instructed on the bottle. If you prefer to use over-the-counter medication, use acetaminophen (Tylenol) or ibuprofen (Advil, Motrin) as instructed on the box. Do not take Tylenol if it is in your narcotic pain medication.   Use an ice pack on your groin  for 20 minutes at a time as needed for the first 24 hours. Be sure to protect your skin by putting a cloth between the ice pack and your skin.   After 24 hours you can switch to heat for 20 minutes as needed. Be sure to protect your skin by putting a cloth between the heat pack and your skin.   It is normal to feel a lump in your groin after surgery. This lump may take up to 8 weeks to go away.   You may experience right shoulder pain after surgery which will go away 1-4 days after your procedure.  This is related to the gas that was used to inflate your abdomen, it gets trapped between your liver and diaphragm. Walk frequently and apply a heating pad (protecting your skin from the heating pad with a barrier such as a towel).      Activities   No driving until you feel it s safe to do so. Don t drive while taking narcotic pain medicine.   You can return to your other activities as normal, but no lifting in excess of 15-20 pounds for 3 weeks.      Special equipment   Male Patients:  We recommend that you wear scrotal support for the first 3 days after surgery; however, you can wear it longer if you wish.  We suggest using an athletic supporter (Jock Strap), snug briefs, or Spandex biker shorts.     Diet   You can eat your regular meals after surgery.     Dental Care  Please avoid dental care for two weeks prior to hernia repair and for 6 weeks after surgery due to the mesh that may be placed.     When to call the doctor   Call your doctor if you have:   A fever above 101 F (38.3 C) (taken under the tongue), or a fever or chills lasting more than a day.   Redness at the incision site.   Any fluid or blood draining from the incision, especially if it smells bad.    Severe pain that doesn t improve with pain medicine.      Go to the emergency room if:   You can t urinate (pee) or feel pressure when you urinate. We will place a small, thin tube (catheter) to empty your bladder. This needs to stay in place for at least 2  days.      We will call you 2 to 4 days after surgery to review this handout, answer questions and help arrange after-surgery care. If you have questions or concerns, please call [phone] during regular office hours. If you need to call after business hours, call [phone] and ask to page the surgeon on-call.     IMPORTANT:  Prior to your surgical procedure, a nurse will be contacting you to obtain a health history.   If they do not reach you by noon the day prior to your surgery, your surgery will be cancelled. If you have your Pre-Op Surgical Physical (H&P) with the Anesthesia Team (PAC), you are exempt from this call. Phone:  [phone] ([facility]) or [phone] ([place]).

## 2025-01-09 NOTE — LETTER
1/9/2025       RE: Valeriano Rendon  [address]  [place] [facility] [address]     Dear Colleague,    Thank you for referring your patient, Valeriano Rendon, to the [facility] [place] [facility]. Please see a copy of my visit note below.    Valeriano Rendon is a 54 year old male with a  history of a right inguinal mass with the following symptoms of lump and pain.    Onset did occur with lifting.  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    Past medical and surgical history, medications, allergies, family history, and social history were reviewed with the patient. Vape nicotine.    ROS: 10 point review of systems negative except noted in HPI  PHYSICAL EXAM  General appearance- healthy, alert, and in no distress.  Skin- Skin color and turgor normal.  No obvious rashes.  Lungs- Respiratory effort unlabored.  Gait- Normal.  Abdomen - soft non distended, non tender umbilical hernia  BIH.  Impression:  Inguinal hernia, bilateral and umbilical hernia  Recommendation:  Laparoscopic bilateral Inguinal Hernioplasty Robot assisted and open mesh repair umbilical hernia.    A full discussion regarding the alternatives, risks, goals, and potential complications for this surgery was completed today.  The patient understood I would use non recalled mesh and  that the potential problems included but are not limited to:  Infection, bleeding, hematoma, seroma, recurrence, injury to nerve/muscle or involved testicle(if male), ischemic orchitis(if male), and chronic pain.    The patient verbally expressed understanding, was given the opportunity for questions, and gives full informed consent for the procedure.      Today the patient was instructed on the need for a preoperative H&P, NPO status prior to surgery, and the need to stop  anticoagulants prior to surgery.  Additional educational material, soap, and instructions will be mailed out to the patients home.    The total time spent with this patient was 30 minutes.  The total time was spent on the date of encounter doing chart review, history and physical, documentation, patient education, and any further activity as noted above.              Again, thank you for allowing me to participate in the care of your patient.      Sincerely,    Jerry Ruth MD

## 2025-01-09 NOTE — PROGRESS NOTES
Valeriano Rendon is a 54 year old male with a  history of a right inguinal mass with the following symptoms of lump and pain.    Onset did occur with lifting.  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    Past medical and surgical history, medications, allergies, family history, and social history were reviewed with the patient. Vape nicotine.    ROS: 10 point review of systems negative except noted in HPI  PHYSICAL EXAM  General appearance- healthy, alert, and in no distress.  Skin- Skin color and turgor normal.  No obvious rashes.  Lungs- Respiratory effort unlabored.  Gait- Normal.  Abdomen - soft non distended, non tender umbilical hernia  BIH.  Impression:  Inguinal hernia, bilateral and umbilical hernia  Recommendation:  Laparoscopic bilateral Inguinal Hernioplasty Robot assisted and open mesh repair umbilical hernia.    A full discussion regarding the alternatives, risks, goals, and potential complications for this surgery was completed today.  The patient understood I would use non recalled mesh and  that the potential problems included but are not limited to:  Infection, bleeding, hematoma, seroma, recurrence, injury to nerve/muscle or involved testicle(if male), ischemic orchitis(if male), and chronic pain.    The patient verbally expressed understanding, was given the opportunity for questions, and gives full informed consent for the procedure.      Today the patient was instructed on the need for a preoperative H&P, NPO status prior to surgery, and the need to stop anticoagulants prior to surgery.  Additional educational material, soap, and instructions will be mailed out to the patients home.    The total time spent with this patient was 30 minutes.  The total time was spent on the date of encounter doing chart review, history and physical, documentation, patient education, and any further activity as  noted above.

## 2025-01-10 ENCOUNTER — MYC MEDICAL ADVICE (OUTPATIENT)
Dept: FAMILY MEDICINE | Facility: CLINIC | Age: 55
End: 2025-01-10
Payer: MEDICAID

## 2025-01-14 ENCOUNTER — OFFICE VISIT (OUTPATIENT)
Dept: FAMILY MEDICINE | Facility: CLINIC | Age: 55
End: 2025-01-14
Payer: MEDICAID

## 2025-01-14 VITALS
SYSTOLIC BLOOD PRESSURE: 100 MMHG | DIASTOLIC BLOOD PRESSURE: 63 MMHG | HEART RATE: 82 BPM | BODY MASS INDEX: 27.66 KG/M2 | WEIGHT: 162 LBS | TEMPERATURE: 97.4 F | RESPIRATION RATE: 24 BRPM | OXYGEN SATURATION: 96 % | HEIGHT: 64 IN

## 2025-01-14 DIAGNOSIS — J32.0 CHRONIC MAXILLARY SINUSITIS: Primary | ICD-10-CM

## 2025-01-14 DIAGNOSIS — S16.1XXD STRAIN OF NECK MUSCLE, SUBSEQUENT ENCOUNTER: ICD-10-CM

## 2025-01-14 DIAGNOSIS — K13.79 MASS OF ORAL CAVITY: ICD-10-CM

## 2025-01-14 DIAGNOSIS — F17.200 NICOTINE DEPENDENCE, UNCOMPLICATED, UNSPECIFIED NICOTINE PRODUCT TYPE: ICD-10-CM

## 2025-01-14 PROCEDURE — 99214 OFFICE O/P EST MOD 30 MIN: CPT | Performed by: FAMILY MEDICINE

## 2025-01-14 RX ORDER — FLUTICASONE PROPIONATE 50 MCG
1 SPRAY, SUSPENSION (ML) NASAL DAILY
Qty: 16 G | Refills: 1 | Status: SHIPPED | OUTPATIENT
Start: 2025-01-14

## 2025-01-14 NOTE — PROGRESS NOTES
Assessment & Plan     Chronic maxillary sinusitis  recurrent  - Adult ENT  Referral; Future  - amoxicillin-clavulanate (AUGMENTIN) 875-125 MG tablet; Take 1 tablet by mouth 2 times daily for 10 days.  - fluticasone (FLONASE) 50 MCG/ACT nasal spray; Spray 1 spray into both nostrils daily.    Mass of oral cavity  Soft tissue 4-5 mm] see dentist and f ENT  - Adult ENT  Referral; Future    Nicotine dependence, uncomplicated, unspecified nicotine product type  Now vaping   work on quiting completely  - [place] Quit Partner Referral; Future    Strain of neck muscle, subsequent encounter  Chronic  - Physical Therapy  Referral; Future            See Patient Instructions    Roxanna Cisneros is a 54 year old, presenting for the following health issues:  Follow Up (HERNIA SURGERY DENTAL WORK MUST BE DONE TWO WEEKS PRIOR AND SIX WEEKS POST /PT IS CONCERNED ABOUT SINUS INFECTION )      1/14/2025     3:12 PM   Additional Questions   Roomed by Nicole MORALEZ     History of Present Illness       Reason for visit:  Sinus infection   He is taking medications regularly.         Acute Illness  Acute illness concerns: sinus pain/ oral lesion   Neck pain  Onset/Duration: sinus hronic but wrose now  Newly noted bump in mouth  Symptoms:  Fever: No  Chills/Sweats: No  Headache (location?): YES  Sinus Pressure: YES  Conjunctivitis:  No  Ear Pain: no  Rhinorrhea: YES  Congestion: YES  Sore Throat: No  Cough: no  Wheeze: No  Decreased Appetite: No   2) neck pain many years  ago. The pain is positional with movement of neck without radiation of pain down the arms.  Symptoms have been waxing and waning since that time. Prior history of neck problems: recurrent self limited episodes of neck pain in the past and previous osteoarthritis of cervical spine. There is no new numbness, tingling, weakness in the arms.      3)Nicotine/Tobacco Cessation  Valeriano Rendon presents to discuss nicotine/tobacco cessation.    Tobacco  "Use    Smoking status: Every Day     Types: Vaping Device    Smokeless tobacco: Former    Tobacco comments:     Quiet cigarettes 4 years ago- uses nicotine vapes   Vaping Use    Vaping status: Every Day    Substances: Nicotine   Substance and Sexual Activity    Alcohol use: No     Alcohol/week: 0.0 standard drinks of alcohol    Drug use: Yes     Comment: marijuana    Sexual activity: Yes         1/8/2025     2:54 PM   Smoking Cessation - Willing To Make Quit Attempt   If I could quit smoking, I would.  5   I want to quit smoking because I worry about how smoking affects my health.   5   I would be willing to make a plan to quit smoking.  5   I would be willing to cut down my number of cigarettes before quiting.  5   Total Score 20        Patient-reported     Prior treatments tried: None        Review of Systems  Constitutional, HEENT, cardiovascular, pulmonary, gi and gu systems are negative, except as otherwise noted.      Objective    /63 (BP Location: Left arm, Patient Position: Sitting, Cuff Size: Adult Regular)   Pulse 82   Temp 97.4  F (36.3  C) (Temporal)   Resp 24   Ht 1.638 m (5' 4.47\")   Wt 73.5 kg (162 lb)   SpO2 96%   BMI 27.40 kg/m    Body mass index is 27.4 kg/m .  Physical Exam   GENERAL: alert and fatigued  EYES: Eyes grossly normal to inspection, PERRL and conjunctivae and sclerae normal  HENT: normal cephalic/atraumatic, ear canals and TM's normal, nose and mouth without ulcers or lesions, nasal mucosa edematous , rhinorrhea green, oropharynx clear, oral mucous membranes moist, and sinuses: maxillary tenderness on left  NECK: no adenopathy, no asymmetry, masses, or scars  tenderness over lower cervical spine and nuchal area, tenderness over trapezial muscles, normal neurological exam of arms; normal DTR's, motor, sensory exam.    SKIN: no suspicious lesions or rashes  NEURO: Normal strength and tone, mentation intact and speech normal  PSYCH: mentation appears normal, affect " normal/bright  LYMPH: no cervical, supraclavicular, axillary, or inguinal adenopathy    Lab on 12/19/2024   Component Date Value Ref Range Status    LILIA interpretation 12/19/2024 Negative  Negative Final      Cytoplasmic pattern observed. The LILIA test is intended to detect antibodies directed against the cell nucleus. However, antibodies against other cellular structures may also be detected. These are often non-specific, although some clinically significant antibodies directed against cytoplasmic components may be observed. Follow-up testing may be considered if clinically indicated.    Negative:              <1:40  Borderline Positive:   1:40 - 1:80  Positive:              >1:80    Cholesterol 12/19/2024 270 (H)  <200 mg/dL Final    Triglycerides 12/19/2024 102  <150 mg/dL Final    Direct Measure HDL 12/19/2024 37 (L)  >=40 mg/dL Final    LDL Cholesterol Calculated 12/19/2024 213 (H)  <100 mg/dL Final    Non HDL Cholesterol 12/19/2024 233 (H)  <130 mg/dL Final    Patient Fasting > 8hrs? 12/19/2024 Yes   Final           Signed Electronically by: Cosme Parham MD

## 2025-01-15 ENCOUNTER — THERAPY VISIT (OUTPATIENT)
Dept: OCCUPATIONAL THERAPY | Facility: CLINIC | Age: 55
End: 2025-01-15
Attending: FAMILY MEDICINE
Payer: MEDICAID

## 2025-01-15 ENCOUNTER — THERAPY VISIT (OUTPATIENT)
Dept: PHYSICAL THERAPY | Facility: CLINIC | Age: 55
End: 2025-01-15
Payer: MEDICAID

## 2025-01-15 DIAGNOSIS — M25.642 STIFFNESS OF JOINTS OF BOTH HANDS: ICD-10-CM

## 2025-01-15 DIAGNOSIS — S16.1XXD STRAIN OF NECK MUSCLE, SUBSEQUENT ENCOUNTER: Primary | ICD-10-CM

## 2025-01-15 DIAGNOSIS — M79.18 MYALGIA, MULTIPLE SITES: ICD-10-CM

## 2025-01-15 DIAGNOSIS — M25.641 STIFFNESS OF JOINTS OF BOTH HANDS: ICD-10-CM

## 2025-01-15 DIAGNOSIS — M79.18 MYALGIA, MULTIPLE SITES: Primary | ICD-10-CM

## 2025-01-15 PROCEDURE — 97110 THERAPEUTIC EXERCISES: CPT | Mod: GO | Performed by: OCCUPATIONAL THERAPIST

## 2025-01-15 PROCEDURE — 97530 THERAPEUTIC ACTIVITIES: CPT | Mod: GO | Performed by: OCCUPATIONAL THERAPIST

## 2025-01-16 ENCOUNTER — ANCILLARY PROCEDURE (OUTPATIENT)
Dept: CT IMAGING | Facility: CLINIC | Age: 55
End: 2025-01-16
Attending: FAMILY MEDICINE
Payer: MEDICAID

## 2025-01-16 DIAGNOSIS — Z87.891 HISTORY OF SMOKING 25-50 PACK YEARS: ICD-10-CM

## 2025-01-16 PROCEDURE — 71271 CT THORAX LUNG CANCER SCR C-: CPT | Mod: GC | Performed by: RADIOLOGY

## 2025-01-17 ENCOUNTER — TELEPHONE (OUTPATIENT)
Dept: NURSING | Facility: CLINIC | Age: 55
End: 2025-01-17
Payer: COMMERCIAL

## 2025-01-17 DIAGNOSIS — R91.8 ABNORMAL CT LUNG SCREENING: ICD-10-CM

## 2025-01-17 NOTE — TELEPHONE ENCOUNTER
"[facility] Radiology Lung Cancer Screening CT result notification:     LDCT/Lung Cancer Screening CT Exam date: 1/16/25  Radiologist Impression AND Recommendations:   Impression:   1. ACR Assessment Category (2022):  Lung-RADS Category 4A. Suspicious.   Not previously visualized right lower lobe solid pulmonary nodule.     Recommendation:  Lung-RADS Category 4A. Suspicious. 3 month LDCT  (please order [id number]); PET/CT may be used when there is a ? 8 mm  (?268.1 mm3) solid component.     Pertinent Findings:  Nodules: 2  The largest and/or fastest 2 of these nodule(s) are as follows:   - 9.1 x 9.1  mm solid nodule in the right lower lobe on series:  4  image:  176.   - 3  mm solid nodule in the left upper lobe on series:  4 image:  114.     Ordering Provider: Cosme Parham MD Anthmax Solislman did not receive the remaining radiology results from their PCP.        Lung Nodule Program Protocol recommendation [Pertaining to lung nodules]:  Lung RADS 4A Protocol:  Results RN to notify Patient of results/recommendations and offer a referral to [facility] Lung Nodule Clinic within 4 to 6 weeks  Offer referral to [facility] Lung Nodule clinic instead of the 3 month CT. (Yes/No/NA):  Offered and accepted  If Patient agrees to a referral to [facility] Lung Nodule Clinic [9023 - Adult Oncology/Hematology  referral with reason for referral \"Interventional pulmonology (Lung Nodule)\"], place referral to [facility] Lung Nodule Clinic to be seen within 4 to 6 weeks.  (Yes/No/NA):  Yes  If Patient refuses referral, place order for 3 month CT ([id number]).   (Yes/No/NA):  NA  Relay information to Patient:  [facility] Scheduling team, [phone], will be calling Patient within 1 week to schedule appt - appt only M-F. (Yes/No/NA): Yes  Results RN routed telephone encounter as FYI to CNS team (Yes/No): Yes    RN communicated the lung nodule finding to the patient (Yes/No):  Yes  The " patient had the following questions: if pleurisy in that past could have led to developing a nodule  Correct letter sent as per [facility] Lung nodule protocol (Yes/No):  Yes  Did Patient have any CT's of chest previous? (Yes/No/NA) No but Chest x-ray 1/8/2025   If no comparisons used, inquire if patient has had any chest CT's in the past and if so, request priors.    If scheduling LDCT only, RN will contact Image Scheduling Team  Hours available (all sites below):  Mon-Fri 7A to 7P; Sat 7A to 3:30P.  No schedulers available on Sunday.  [facility] ([place], [place], [place], and [place]): [phone]  [place] region ([place] and [place]): [phone]  [place] region ([place], [facility], and [place]): [phone]    Lung Nodule Clinics  Pulomonary (Lung Care) Clinic at the [facility]  Floor 2, Check-In D, [address] [address] [place], [place]  Hours: Mon - Fri 7:00 AM to 5:00 PM  [facility] at [facility]   Floor 2, [address], [place]  [phone]  Hours: Mon - Fri 7:00 AM - 7:00 PM;  Sat 8:00 AM to 12:00 PM (noon)  [facility] [place]  [address] [place], [place], [phone]  Hours: Mon - Fri 8:00 AM - 4:30 PM  [facility]  [address], [place] [address]  Hours: Mon-Thurs 7:00 AM- 6:30 PM;  Friday 7:00 AM- 5:30 PM  [facility] [place]  [address] [place] [address] [phone]  Hours: Mon -Thurs 7:00 AM-7:00 PM;  Friday 12:00 PM (noon) - 4 PM      Erasmo Bains RN  [facility] Macrotherapy [place]  Lung Nodule and Emergency Dept Lab Result RN  Ph# [phone]    Grade 3 transaminitis with hyperbilirubinemia, improving   Posaconazole changed to caspofungin  5/1 Promacta placed on hold  5/4 US Abdomen unremarkable  Promacta resumed on 5/12

## 2025-01-23 ENCOUNTER — PRE VISIT (OUTPATIENT)
Dept: PULMONOLOGY | Facility: CLINIC | Age: 55
End: 2025-01-23
Payer: MEDICAID

## 2025-01-23 ENCOUNTER — ONCOLOGY VISIT (OUTPATIENT)
Dept: PULMONOLOGY | Facility: CLINIC | Age: 55
End: 2025-01-23
Attending: FAMILY MEDICINE
Payer: MEDICAID

## 2025-01-23 VITALS
HEIGHT: 65 IN | SYSTOLIC BLOOD PRESSURE: 110 MMHG | DIASTOLIC BLOOD PRESSURE: 71 MMHG | HEART RATE: 86 BPM | WEIGHT: 163 LBS | OXYGEN SATURATION: 100 % | TEMPERATURE: 98.1 F | RESPIRATION RATE: 20 BRPM | BODY MASS INDEX: 27.16 KG/M2

## 2025-01-23 DIAGNOSIS — R91.8 ABNORMAL CT LUNG SCREENING: ICD-10-CM

## 2025-01-23 PROCEDURE — G0463 HOSPITAL OUTPT CLINIC VISIT: HCPCS | Performed by: STUDENT IN AN ORGANIZED HEALTH CARE EDUCATION/TRAINING PROGRAM

## 2025-01-23 ASSESSMENT — PAIN SCALES - GENERAL: PAINLEVEL_OUTOF10: MODERATE PAIN (4)

## 2025-01-23 NOTE — PROGRESS NOTES
LUNG NODULE & INTERVENTIONAL PULMONARY CLINIC  [facility]    Valeriano Rendon MRN# [id number]   Age: 54 year old YOB: 1970     Reason for Consultation: Lung nodule    Requesting Physician: Cosme Parham MD  [address]  [place] [address]     Assessment and Plan:    Valeriano Rendon is a 54 year old male who presents for evaluation of a lung nodule.    I independently reviewed their CT scan from 25 which reveals a 8mm RLL peripheral lung nodule. No prior CT to compare with.     Plan on repeat CT in 3 months with follow up.     Will send message to PCP about sensation loss in his hands.       Patient indicated understanding and agreed to the plan of care. All questions answered.     Fareed Danielson DO   of Medicine  Interventional Pulmonology  Department of Pulmonary, Allergy, Critical Care and Sleep Medicine   [facility]     History:  Valeriano Rendon is a 54 year old male who presents for evaluation of a lung nodule.   Currenty on abx for dental abscess and sinus infection.   Cough? no  Shortness of breath? Yes after he climbs stairs. May hold his breath. Other exertional activities are ok.   Smoking history: Smoked cigarettes for 30 years. Most he smoked was 1ppd. Quit in Aug 2021. Uses a nicotine vape.   Prior history of lung problems: No.   Family history: Yes, dad had oral cancer. Brother  of lung cancer.   Exposure to fungus/mold: Yes, bathroom remodel.   Exposure to tuberculosis: No  Radon exposure: no  Asbestos exposure: yes  Immunosuppressed? No  Difficulty swallowing? Yes, relative to some pain in the neck.   Weight loss? No  Night sweats? Yes entire adult life.   Fever or chills? No    Medications:  Current Outpatient Medications   Medication Sig Dispense Refill    amoxicillin-clavulanate (AUGMENTIN) 875-125 MG tablet Take 1 tablet by mouth 2 times daily for 10 days. 20  "tablet 0    fluticasone (FLONASE) 50 MCG/ACT nasal spray Spray 1 spray into both nostrils daily. 16 g 1    ibuprofen (ADVIL/MOTRIN) 800 MG tablet Take 1 tablet (800 mg) by mouth every 8 hours as needed for moderate pain 30 tablet 1    ketoconazole (NIZORAL) 2 % external cream Apply topically daily. 60 g 1     No current facility-administered medications for this visit.         Physical exam:  /71   Pulse 86   Temp 98.1  F (36.7  C) (Oral)   Resp 20   Ht 1.638 m (5' 4.5\")   Wt 73.9 kg (163 lb)   SpO2 100%   BMI 27.55 kg/m    Wt Readings from Last 4 Encounters:   01/23/25 73.9 kg (163 lb)   01/14/25 73.5 kg (162 lb)   01/09/25 72.3 kg (159 lb 6.4 oz)   01/08/25 73 kg (161 lb)     General: Well appearing, nonlabored breathing  Neuro: Answering questions appropriately  Psych: Normal affect       "

## 2025-01-23 NOTE — NURSING NOTE
"Oncology Rooming Note    January 23, 2025 12:59 PM   Valeriano Rendon is a 54 year old male who presents for:    Chief Complaint   Patient presents with    Oncology Clinic Visit     New Oncology     Initial Vitals: /71   Pulse 86   Temp 98.1  F (36.7  C) (Oral)   Resp 20   Ht 1.638 m (5' 4.5\")   Wt 73.9 kg (163 lb)   SpO2 100%   BMI 27.55 kg/m   Estimated body mass index is 27.55 kg/m  as calculated from the following:    Height as of this encounter: 1.638 m (5' 4.5\").    Weight as of this encounter: 73.9 kg (163 lb). Body surface area is 1.83 meters squared.  Moderate Pain (4) Comment: Data Unavailable   No LMP for male patient.    Provider entered room to start the visit after obtaining vitals.      Jalen Easley LPN            "

## 2025-01-26 NOTE — PROGRESS NOTES
Chief Complaint   Patient presents with    Consult     Oral cavity mass (was on Abx for possible abscess )- left sinus congestion     History of Present Illness   Valeriano Rendon is a 54 year old male who presents today for evaluation. I am seeing this patient in consultation for chronic maxillary sinusitis, mass of oral cavity at the request of the provider Dr. Cosme Parham. The patient describes symptoms of nose and sinus congestion for the past several years.  The patient was also seen with a lesion inside of his mouth.  He eventually was seen and diagnosed with a tooth infection and placed on Augmentin.  The patient feels that his oral symptoms and his nasal symptoms have improved on the Augmentin.     He reports a remote history of nasal trauma and since that time he has intermittently had some colored nasal drainage more so on the left-hand side.  He denies any significant rhinorrhea, postnasal drainage, taste or smell changes.  No previous history of nose or sinus surgery.  No recent nose or sinus imaging.     The lesion inside the mouth is significantly decreased in size and is thought to be related to a right posterior molar infection.  He plans on seeing a dentist for evaluation of potential root canal in the future after finishing the antibiotics.  He is a former tobacco user but currently is vaping.    Past Medical History  Patient Active Problem List   Diagnosis    CARDIOVASCULAR SCREENING; LDL GOAL LESS THAN 130    Encounter for tobacco use cessation counseling    Family history of melanoma    Chronic right-sided low back pain without sciatica    Strain of neck muscle, subsequent encounter    Myalgia, multiple sites    Stiffness of joints of both hands    Abnormal CT lung screening     Current Medications     Current Outpatient Medications:     fluticasone (FLONASE) 50 MCG/ACT nasal spray, Spray 1 spray into both nostrils daily., Disp: 16 g, Rfl: 1    ibuprofen (ADVIL/MOTRIN) 800 MG tablet,  Take 1 tablet (800 mg) by mouth every 8 hours as needed for moderate pain, Disp: 30 tablet, Rfl: 1    ketoconazole (NIZORAL) 2 % external cream, Apply topically daily., Disp: 60 g, Rfl: 1    Allergies  Allergies   Allergen Reactions    Morphine And Codeine Nausea and Vomiting    Vicodin [Hydrocodone-Acetaminophen] GI Disturbance       Social History   Social History     Socioeconomic History    Marital status: Single   Tobacco Use    Smoking status: Every Day     Types: Vaping Device    Smokeless tobacco: Former    Tobacco comments:     Quiet cigarettes 4 years ago- uses nicotine vapes   Vaping Use    Vaping status: Every Day    Substances: Nicotine   Substance and Sexual Activity    Alcohol use: No     Alcohol/week: 0.0 standard drinks of alcohol    Drug use: Yes     Comment: marijuana    Sexual activity: Yes   Other Topics Concern    Parent/sibling w/ CABG, MI or angioplasty before 65F 55M? No     Social Drivers of Health     Financial Resource Strain: Low Risk  (1/8/2025)    Financial Resource Strain     Within the past 12 months, have you or your family members you live with been unable to get utilities (heat, electricity) when it was really needed?: No   Food Insecurity: Low Risk  (1/8/2025)    Food Insecurity     Within the past 12 months, did you worry that your food would run out before you got money to buy more?: No     Within the past 12 months, did the food you bought just not last and you didn t have money to get more?: No   Transportation Needs: Low Risk  (1/8/2025)    Transportation Needs     Within the past 12 months, has lack of transportation kept you from medical appointments, getting your medicines, non-medical meetings or appointments, work, or from getting things that you need?: No   Physical Activity: Sufficiently Active (1/8/2025)    Exercise Vital Sign     Days of Exercise per Week: 5 days     Minutes of Exercise per Session: 60 min   Stress: Stress Concern Present (1/8/2025)    [place]  "[place] [employer] - Occupational Stress Questionnaire     Feeling of Stress : Rather much   Social Connections: Unknown (1/8/2025)    Social Connection and Isolation Panel [NHANES]     Frequency of Social Gatherings with Friends and Family: Once a week   Interpersonal Safety: Low Risk  (12/19/2024)    Interpersonal Safety     Do you feel physically and emotionally safe where you currently live?: Yes     Within the past 12 months, have you been hit, slapped, kicked or otherwise physically hurt by someone?: No     Within the past 12 months, have you been humiliated or emotionally abused in other ways by your partner or ex-partner?: No   Housing Stability: Low Risk  (1/8/2025)    Housing Stability     Do you have housing? : Yes     Are you worried about losing your housing?: No       Family History  Family History   Problem Relation Age of Onset    Melanoma Other         maternal aunt and uncle    Skin Cancer No family hx of        Review of Systems  As per HPI and PMHx, otherwise 10+ comprehensive system review is negative.    Physical Exam  /64   Pulse 97   Resp 16   Ht 1.638 m (5' 4.5\")   Wt 72.9 kg (160 lb 12.8 oz)   SpO2 97%   BMI 27.17 kg/m    GENERAL: The patient is a pleasant, cooperative 54 year old male in no acute distress.  HEAD: Normocephalic, atraumatic. Hair and scalp are normal.  EYES: Pupils are equal, round, reactive to light and accommodation. Extraocular movements are intact. The sclera nonicteric without injection. The extraocular structures are normal.  EARS: Normal shape and symmetry. No tenderness when palpating the mastoid or tragal areas bilaterally. No mastoid erythema or fluctuance. Otoscopic exam on the right reveals a minimal amount of cerumen. The right tympanic membrane is round, intact without evidence of effusion, good landmarks.  No retraction, granulation, or drainage. Otoscopic exam on the left reveals a minimal amount of cerumen. The left tympanic " membrane is round, intact without evidence of effusion, good landmarks.  No retraction, granulation, or drainage.  NOSE: Nares are patent.  Nasal mucosa is pink and moist.  The nasal septum deviates to the right superiorly into the left inferiorly off the maxillary crest with a spur posteriorly.  The patient has moderate erythema and edema.  There is some moderate sticky, inflammatory mucus.  The inferior turbinates are moderately hypertrophied.  No nasal cavity masses, polyps, or mucopurulence on anterior rhinoscopy.  ORAL CAVITY: Lips are normal. Dentition is in reasonably good repair. Mucous membranes are moist. Tongue is mobile, protrudes to the midline.  Palate elevates symmetrically. Tonsils are 1+, symmetric. No erythema or exudate.  Careful examination of the right mandibular alveolus shows a raised area near a posterior mandibular molar.  No fluctuance or erythema.  No additional oral cavity or oropharyngeal masses, lesions, ulcerations, or leukoplakia.  NECK: Supple, trachea is midline. There is no palpable cervical lymphadenopathy or masses bilaterally. Palpation of the bilateral parotid and submandibular areas reveal no masses. No thyromegaly.    NEUROLOGIC: Cranial nerves II through XII are grossly intact. Voice is strong. Patient is House-Brackmann I/VI bilaterally.  CARDIOVASCULAR: Extremities are warm and well-perfused. No significant peripheral edema.  RESPIRATORY: Patient has nonlabored breathing without cough, wheeze, stridor.  PSYCHIATRIC: Patient is alert and oriented. Mood and affect appear normal.  SKIN: Warm and dry. No scalp, face, or neck lesions noted.    Procedure: Flexible Nasal Endoscopy  Indication: Nasal congestion, nasal obstruction, sinus pressure    To best visualize the sinonasal anatomyand due to the chief complaint and HPI, I proceeded with flexible fiberoptic nasal endoscopy. The bilateral nasal cavities were anesthetized and decongested. The bilateral nasal cavities were then  examined using flexible fiberoptic nasal endoscope. The right nasal cavity was without masses, polyps, or mucopurulence. The right middle turbinate and middle meatus was normal in appearance. The sphenoethmoid recess, inferior meatus, superior meatus, and frontal sinus outflow areas were clear. The left nasal cavity was without masses, polyps, or mucopurulence. The left middle turbinate and middle meatus was normal in appearance. The sphenoethmoid recess, inferior meatus, superior meatus, and frontal sinus outflow areas were clear. The sinonasal mucosa appeared boggy and inflamed with sticky, inflammatory mucus. The nasal septum deviates to the right superiorly and the left more inferiorly off the maxillary crest and with a spur towards the left. The inferior turbinates were moderately hypertrophied. The nasopharynx had a normal appearance with normal Eustachian tube openings and fossa of Rosenmuller bilaterally.  Minimal adenoid tissue. The scope was removed. The patient tolerated the procedure well.    Assessment and Plan     ICD-10-CM    1. Chronic sinusitis, unspecified location  J32.9 NASAL ENDOSCOPY, DIAGNOSTIC     Adult ENT  Referral      2. Mass of oral cavity  K13.79 Adult ENT  Referral      3. Nasal obstruction  J34.89       4. Deviated nasal septum  J34.2       5. Hypertrophy of both inferior nasal turbinates  J34.3       6. Chronic rhinitis  J31.0       7. Dental infection  K04.7         It was my pleasure seeing Valeriano Rendon today in clinic.  The patient presents with chronic nasal congestion, nasal obstruction.  It is hard to know if this is related solely to his structural causes of nasal obstruction or if he might have some underlying allergic inflammation.  Will have him continue the Flonase that was prescribed.  We discussed using nasal saline irrigation prior to the Flonase.  If he has acute symptoms, he can try Afrin for 3 days prior to irrigation.  I would recommend  a CT scan of the sinuses when the patient is acutely symptomatic to determine if the patient is having sinusitis and to visualize the anatomy.  The patient will contact me for CT scan PRIOR to receiving antibiotic therapy.  We will review the study at that time to determine any evidence of sinus disease that might be helped with surgical intervention.  In the meantime, the patient is advised to contact my office if there are any problems taking the medications prescribed.    We will hold off on allergy evaluation at this time, but would consider in the future if they do not improve with medical management.      The patient will follow-up with his dentist regarding his oral lesions/tooth infection.      Tong Harvey MD  Department of Otolaryngology-Head and Neck Surgery  [facility]

## 2025-01-29 ENCOUNTER — OFFICE VISIT (OUTPATIENT)
Dept: OTOLARYNGOLOGY | Facility: CLINIC | Age: 55
End: 2025-01-29
Payer: COMMERCIAL

## 2025-01-29 VITALS
SYSTOLIC BLOOD PRESSURE: 110 MMHG | BODY MASS INDEX: 26.79 KG/M2 | OXYGEN SATURATION: 97 % | RESPIRATION RATE: 16 BRPM | HEIGHT: 65 IN | HEART RATE: 97 BPM | DIASTOLIC BLOOD PRESSURE: 64 MMHG | WEIGHT: 160.8 LBS

## 2025-01-29 DIAGNOSIS — K13.79 MASS OF ORAL CAVITY: ICD-10-CM

## 2025-01-29 DIAGNOSIS — J34.3 HYPERTROPHY OF BOTH INFERIOR NASAL TURBINATES: ICD-10-CM

## 2025-01-29 DIAGNOSIS — K04.7 DENTAL INFECTION: ICD-10-CM

## 2025-01-29 DIAGNOSIS — J34.89 NASAL OBSTRUCTION: ICD-10-CM

## 2025-01-29 DIAGNOSIS — J31.0 CHRONIC RHINITIS: ICD-10-CM

## 2025-01-29 DIAGNOSIS — J34.2 DEVIATED NASAL SEPTUM: ICD-10-CM

## 2025-01-29 DIAGNOSIS — J32.9 CHRONIC SINUSITIS, UNSPECIFIED LOCATION: Primary | ICD-10-CM

## 2025-01-29 NOTE — LETTER
1/29/2025      Valeriano Rendon  [address]  [facility] [address]      Dear Colleague,    Thank you for referring your patient, Valeriano Rendon, to the [facility]. Please see a copy of my visit note below.    Chief Complaint   Patient presents with     Consult     Oral cavity mass (was on Abx for possible abscess )- left sinus congestion     History of Present Illness   Valeriano Rendon is a 54 year old male who presents today for evaluation. I am seeing this patient in consultation for chronic maxillary sinusitis, mass of oral cavity at the request of the provider Dr. Cosme Parham. The patient describes symptoms of nose and sinus congestion for the past several years.  The patient was also seen with a lesion inside of his mouth.  He eventually was seen and diagnosed with a tooth infection and placed on Augmentin.  The patient feels that his oral symptoms and his nasal symptoms have improved on the Augmentin.     He reports a remote history of nasal trauma and since that time he has intermittently had some colored nasal drainage more so on the left-hand side.  He denies any significant rhinorrhea, postnasal drainage, taste or smell changes.  No previous history of nose or sinus surgery.  No recent nose or sinus imaging.     The lesion inside the mouth is significantly decreased in size and is thought to be related to a right posterior molar infection.  He plans on seeing a dentist for evaluation of potential root canal in the future after finishing the antibiotics.  He is a former tobacco user but currently is vaping.    Past Medical History  Patient Active Problem List   Diagnosis     CARDIOVASCULAR SCREENING; LDL GOAL LESS THAN 130     Encounter for tobacco use cessation counseling     Family history of melanoma     Chronic right-sided low back pain without sciatica     Strain of neck muscle, subsequent encounter     Myalgia, multiple sites     Stiffness of  joints of both hands     Abnormal CT lung screening     Current Medications     Current Outpatient Medications:      fluticasone (FLONASE) 50 MCG/ACT nasal spray, Spray 1 spray into both nostrils daily., Disp: 16 g, Rfl: 1     ibuprofen (ADVIL/MOTRIN) 800 MG tablet, Take 1 tablet (800 mg) by mouth every 8 hours as needed for moderate pain, Disp: 30 tablet, Rfl: 1     ketoconazole (NIZORAL) 2 % external cream, Apply topically daily., Disp: 60 g, Rfl: 1    Allergies  Allergies   Allergen Reactions     Morphine And Codeine Nausea and Vomiting     Vicodin [Hydrocodone-Acetaminophen] GI Disturbance       Social History   Social History     Socioeconomic History     Marital status: Single   Tobacco Use     Smoking status: Every Day     Types: Vaping Device     Smokeless tobacco: Former     Tobacco comments:     Quiet cigarettes 4 years ago- uses nicotine vapes   Vaping Use     Vaping status: Every Day     Substances: Nicotine   Substance and Sexual Activity     Alcohol use: No     Alcohol/week: 0.0 standard drinks of alcohol     Drug use: Yes     Comment: marijuana     Sexual activity: Yes   Other Topics Concern     Parent/sibling w/ CABG, MI or angioplasty before 65F 55M? No     Social Drivers of Health     Financial Resource Strain: Low Risk  (1/8/2025)    Financial Resource Strain      Within the past 12 months, have you or your family members you live with been unable to get utilities (heat, electricity) when it was really needed?: No   Food Insecurity: Low Risk  (1/8/2025)    Food Insecurity      Within the past 12 months, did you worry that your food would run out before you got money to buy more?: No      Within the past 12 months, did the food you bought just not last and you didn t have money to get more?: No   Transportation Needs: Low Risk  (1/8/2025)    Transportation Needs      Within the past 12 months, has lack of transportation kept you from medical appointments, getting your medicines, non-medical  "meetings or appointments, work, or from getting things that you need?: No   Physical Activity: Sufficiently Active (1/8/2025)    Exercise Vital Sign      Days of Exercise per Week: 5 days      Minutes of Exercise per Session: 60 min   Stress: Stress Concern Present (1/8/2025)    [facility] - Occupational Stress Questionnaire      Feeling of Stress : Rather much   Social Connections: Unknown (1/8/2025)    Social Connection and Isolation Panel [NHANES]      Frequency of Social Gatherings with Friends and Family: Once a week   Interpersonal Safety: Low Risk  (12/19/2024)    Interpersonal Safety      Do you feel physically and emotionally safe where you currently live?: Yes      Within the past 12 months, have you been hit, slapped, kicked or otherwise physically hurt by someone?: No      Within the past 12 months, have you been humiliated or emotionally abused in other ways by your partner or ex-partner?: No   Housing Stability: Low Risk  (1/8/2025)    Housing Stability      Do you have housing? : Yes      Are you worried about losing your housing?: No       Family History  Family History   Problem Relation Age of Onset     Melanoma Other         maternal aunt and uncle     Skin Cancer No family hx of        Review of Systems  As per HPI and PMHx, otherwise 10+ comprehensive system review is negative.    Physical Exam  /64   Pulse 97   Resp 16   Ht 1.638 m (5' 4.5\")   Wt 72.9 kg (160 lb 12.8 oz)   SpO2 97%   BMI 27.17 kg/m    GENERAL: The patient is a pleasant, cooperative 54 year old male in no acute distress.  HEAD: Normocephalic, atraumatic. Hair and scalp are normal.  EYES: Pupils are equal, round, reactive to light and accommodation. Extraocular movements are intact. The sclera nonicteric without injection. The extraocular structures are normal.  EARS: Normal shape and symmetry. No tenderness when palpating the mastoid or tragal areas bilaterally. No mastoid erythema or " fluctuance. Otoscopic exam on the right reveals a minimal amount of cerumen. The right tympanic membrane is round, intact without evidence of effusion, good landmarks.  No retraction, granulation, or drainage. Otoscopic exam on the left reveals a minimal amount of cerumen. The left tympanic membrane is round, intact without evidence of effusion, good landmarks.  No retraction, granulation, or drainage.  NOSE: Nares are patent.  Nasal mucosa is pink and moist.  The nasal septum deviates to the right superiorly into the left inferiorly off the maxillary crest with a spur posteriorly.  The patient has moderate erythema and edema.  There is some moderate sticky, inflammatory mucus.  The inferior turbinates are moderately hypertrophied.  No nasal cavity masses, polyps, or mucopurulence on anterior rhinoscopy.  ORAL CAVITY: Lips are normal. Dentition is in reasonably good repair. Mucous membranes are moist. Tongue is mobile, protrudes to the midline.  Palate elevates symmetrically. Tonsils are 1+, symmetric. No erythema or exudate.  Careful examination of the right mandibular alveolus shows a raised area near a posterior mandibular molar.  No fluctuance or erythema.  No additional oral cavity or oropharyngeal masses, lesions, ulcerations, or leukoplakia.  NECK: Supple, trachea is midline. There is no palpable cervical lymphadenopathy or masses bilaterally. Palpation of the bilateral parotid and submandibular areas reveal no masses. No thyromegaly.    NEUROLOGIC: Cranial nerves II through XII are grossly intact. Voice is strong. Patient is House-Brackmann I/VI bilaterally.  CARDIOVASCULAR: Extremities are warm and well-perfused. No significant peripheral edema.  RESPIRATORY: Patient has nonlabored breathing without cough, wheeze, stridor.  PSYCHIATRIC: Patient is alert and oriented. Mood and affect appear normal.  SKIN: Warm and dry. No scalp, face, or neck lesions noted.    Procedure: Flexible Nasal Endoscopy  Indication:  Nasal congestion, nasal obstruction, sinus pressure    To best visualize the sinonasal anatomyand due to the chief complaint and HPI, I proceeded with flexible fiberoptic nasal endoscopy. The bilateral nasal cavities were anesthetized and decongested. The bilateral nasal cavities were then examined using flexible fiberoptic nasal endoscope. The right nasal cavity was without masses, polyps, or mucopurulence. The right middle turbinate and middle meatus was normal in appearance. The sphenoethmoid recess, inferior meatus, superior meatus, and frontal sinus outflow areas were clear. The left nasal cavity was without masses, polyps, or mucopurulence. The left middle turbinate and middle meatus was normal in appearance. The sphenoethmoid recess, inferior meatus, superior meatus, and frontal sinus outflow areas were clear. The sinonasal mucosa appeared boggy and inflamed with sticky, inflammatory mucus. The nasal septum deviates to the right superiorly and the left more inferiorly off the maxillary crest and with a spur towards the left. The inferior turbinates were moderately hypertrophied. The nasopharynx had a normal appearance with normal Eustachian tube openings and fossa of Rosenmuller bilaterally.  Minimal adenoid tissue. The scope was removed. The patient tolerated the procedure well.    Assessment and Plan     ICD-10-CM    1. Chronic sinusitis, unspecified location  J32.9 NASAL ENDOSCOPY, DIAGNOSTIC     Adult ENT  Referral      2. Mass of oral cavity  K13.79 Adult ENT  Referral      3. Nasal obstruction  J34.89       4. Deviated nasal septum  J34.2       5. Hypertrophy of both inferior nasal turbinates  J34.3       6. Chronic rhinitis  J31.0       7. Dental infection  K04.7         It was my pleasure seeing Valeriano Rendon today in clinic.  The patient presents with chronic nasal congestion, nasal obstruction.  It is hard to know if this is related solely to his structural causes of  nasal obstruction or if he might have some underlying allergic inflammation.  Will have him continue the Flonase that was prescribed.  We discussed using nasal saline irrigation prior to the Flonase.  If he has acute symptoms, he can try Afrin for 3 days prior to irrigation.  I would recommend a CT scan of the sinuses when the patient is acutely symptomatic to determine if the patient is having sinusitis and to visualize the anatomy.  The patient will contact me for CT scan PRIOR to receiving antibiotic therapy.  We will review the study at that time to determine any evidence of sinus disease that might be helped with surgical intervention.  In the meantime, the patient is advised to contact my office if there are any problems taking the medications prescribed.    We will hold off on allergy evaluation at this time, but would consider in the future if they do not improve with medical management.      The patient will follow-up with his dentist regarding his oral lesions/tooth infection.      Tong Harvey MD  Department of Otolaryngology-Head and Neck Surgery  [facility]       Again, thank you for allowing me to participate in the care of your patient.        Sincerely,        Tong Harvey MD    Electronically signed

## 2025-01-29 NOTE — PATIENT INSTRUCTIONS
RECURRENT ACUTE SINUSITIS INSTRUCTIONS    Nasal rinse and topical nasal spray once daily.   Sick Sinus Plan with symptoms of sinus infection.  Contact office or schedule sinus CT PRIOR to receiving antibiotics.    You will be starting nasal saline irrigations and will need to obtain the following:      - NeilMed Sinus Rinse 8 oz Kit  - Distilled or filtered water   - Normal saline salt packets    Place filtered or distilled water into the NeilMed bottle up to the fill line (DO NOT USE TAP OR WELL WATER).  Place the pre-made salt packet in the 8 oz of saline.  Shake the bottle to suspend into solution.  Lean head forward over a sink or a basin.  Rinse each side of the nose with one-half of the bottle (each squeeze is about one-half of the bottle). Rinse the nose daily.     If you use topical nasal sprays, apply following irrigation.    SICK SINUS PLAN  If you feel like you are getting a sinus infection, obtain oxymetazoline (Afrin) nasal spray.  Oxymetazoline (Afrin) is available over the counter.  Place 2 sprays in each nostril.  Perform nasal saline irrigation in each nostril 20-30 minutes following application of the oxymetazoline spray.  You can use this medication for up to 3 days or 6 doses.  If your sinus symptoms are persistent, please contact your ENT surgeon.    Video example: [email or web address].com/[email or web address]

## 2025-02-05 NOTE — TELEPHONE ENCOUNTER
VS: /66 (BP Location: Right arm)   Pulse 80   Temp 98.6  F (37  C) (Oral)   Resp 18   Wt 84 kg (185 lb 3 oz)   LMP  (LMP Unknown)   SpO2 93%   BMI 31.79 kg/m     O2: 4 LPM via nasal cannula, tried titrating saturation dropping below 90%, denies SOB and chest pain  CPAP on at HS connected to 3 LMP    Output: Due to void, with hesitancy, at 1755 PVR was 635, straight cath done and was able to drain 730 ml, FYI message sent to medicine, just to continue monitoring retention and need for another straight catheterization   Last BM: 2-4-2025   Activity: SBA with gait belt, NWB on LUE   Skin: Surgical wound on L shoulder   Pain: On scheduled tylenol, toradol and flexeril   CMS: A&Ox4, with numbness and tingling on LUE due to nerve block    Dressing: L shoulder CDI   Diet: Regular diet   LDA: PIV on R hand saline locked   Equipment: Personal items, call light and IV pole   Plan: Pending, to continue POC   Additional Info:         RECORDS STATUS - ALL OTHER DIAGNOSIS      RECORDS RECEIVED FROM: [facility] - Internal records   DATE RECEIVED: 1/20

## 2025-02-06 ENCOUNTER — TELEPHONE (OUTPATIENT)
Dept: GASTROENTEROLOGY | Facility: CLINIC | Age: 55
End: 2025-02-06
Payer: COMMERCIAL

## 2025-02-06 NOTE — TELEPHONE ENCOUNTER
Pre visit planning completed.      Procedure details:    Patient scheduled for Colonoscopy on 2/12/25.     Approximate arrival time: 0645. Procedure time 0730.   *Ensure patient is aware that endoscopy team will be calling about 2 days prior to procedure date to confirm arrival time as this may change.     Facility location: [facility]; [address], [place] [address]. Check in location: 2nd Floor at Surgery desk.  *Disclaimer: Drivers are to check in with patient and stay on campus during procedure.     Sedation type: Conscious sedation     Pre op exam needed? No.    Indication for procedure: screening      Chart review:     Electronic implanted devices? No    Recent diagnosis of diverticulitis within the last 6 weeks? No      Medication review:    Diabetic? No    Anticoagulants? No    Weight loss medication/injectable? No GLP-1 medication per patient's medication list. Nursing to verify with pre-assessment call.    Other medication HOLDING recommendations:  N/A      Prep for procedure:     Bowel prep recommendation: Standard Miralax.   Due to: standard bowel prep    Procedure information and instructions sent via [employer]         Corinne Kliber, RN  Endoscopy Procedure Pre Assessment   [phone] option 2

## 2025-02-07 NOTE — TELEPHONE ENCOUNTER
Sent to  anes:    Patient was seen for ZARAGOZA 1/8/25, with recommendation for chest xrays.  Chest Xrays completed.     Noted in 1/17/25  encounter:  abnormal CT with lung nodule and a PFT ordered, however, not completed       Per Dr Kim:   Fuller, Benjamin, MD Kliber, Corinne, RN  If normal oxygentation and not in respiratory distress (both of which seem to be the case based on notes). Can proceed without PFTs.            Ok to proceed - will discuss with patient oxygen levels during pre assessment.    Corinne Kliber, RN  Endoscopy Procedure Pre Assessment RN  [phone] option 2

## 2025-02-07 NOTE — TELEPHONE ENCOUNTER
Attempted to contact patient in order to complete pre assessment questions.     No answer. Left message to return call to [phone] option 2.    Callback communication sent via [employer].    Corinne Kliber, RN

## 2025-02-10 ENCOUNTER — TELEPHONE (OUTPATIENT)
Dept: GASTROENTEROLOGY | Facility: CLINIC | Age: 55
End: 2025-02-10
Payer: COMMERCIAL

## 2025-02-11 NOTE — TELEPHONE ENCOUNTER
Pre assessment completed for upcoming procedure.   (Please see previous telephone encounter notes for complete details)    Procedure details:    Approximate time and facility location reviewed.   Patient is aware that endoscopy team will be calling about 2 days prior to confirm arrival time.    Designated  policy reviewed and that site requests drivers to check in and stay on campus. Instructed to have someone stay 6  hours post procedure.   *Disclaimer - please notify the [facility] RN GI staff with any  issues/concerns.     Medication review:    Medications reviewed. Please see supporting documentation below. Holding recommendations discussed (if applicable).       Prep for procedure:     Procedure prep instructions reviewed.        Any additional information needed:  Writer advised patient if oxygenation low or if he is in any respiratory distress the procedure may be cancelled.  The patient verbalizes understanding and states he has not had any respiratory issues recently.      Patient verbalized understanding and had no questions or concerns at this time.      Corinne Kliber, RN  Endoscopy Procedure Pre Assessment   [phone] option 2

## 2025-02-12 ENCOUNTER — HOSPITAL ENCOUNTER (OUTPATIENT)
Facility: AMBULATORY SURGERY CENTER | Age: 55
Discharge: HOME | End: 2025-02-12
Payer: COMMERCIAL

## 2025-02-12 VITALS
RESPIRATION RATE: 16 BRPM | HEART RATE: 52 BPM | TEMPERATURE: 97.2 F | OXYGEN SATURATION: 99 % | SYSTOLIC BLOOD PRESSURE: 112 MMHG | DIASTOLIC BLOOD PRESSURE: 75 MMHG

## 2025-02-12 DIAGNOSIS — Z12.11 ENCOUNTER FOR SCREENING COLONOSCOPY: Primary | ICD-10-CM

## 2025-02-12 LAB — COLONOSCOPY: NORMAL

## 2025-02-12 RX ORDER — ONDANSETRON 4 MG/1
4 TABLET, ORALLY DISINTEGRATING ORAL EVERY 6 HOURS PRN
Status: DISCONTINUED | OUTPATIENT
Start: 2025-02-12 | End: 2025-02-13 | Stop reason: HOSPADM

## 2025-02-12 RX ORDER — FLUMAZENIL 0.1 MG/ML
0.2 INJECTION, SOLUTION INTRAVENOUS
Status: ACTIVE | OUTPATIENT
Start: 2025-02-12 | End: 2025-02-12

## 2025-02-12 RX ORDER — LIDOCAINE 40 MG/G
CREAM TOPICAL
Status: DISCONTINUED | OUTPATIENT
Start: 2025-02-12 | End: 2025-02-13 | Stop reason: HOSPADM

## 2025-02-12 RX ORDER — NALOXONE HYDROCHLORIDE 0.4 MG/ML
0.4 INJECTION, SOLUTION INTRAMUSCULAR; INTRAVENOUS; SUBCUTANEOUS
Status: DISCONTINUED | OUTPATIENT
Start: 2025-02-12 | End: 2025-02-13 | Stop reason: HOSPADM

## 2025-02-12 RX ORDER — ONDANSETRON 2 MG/ML
4 INJECTION INTRAMUSCULAR; INTRAVENOUS
Status: DISCONTINUED | OUTPATIENT
Start: 2025-02-12 | End: 2025-02-13 | Stop reason: HOSPADM

## 2025-02-12 RX ORDER — NALOXONE HYDROCHLORIDE 0.4 MG/ML
0.2 INJECTION, SOLUTION INTRAMUSCULAR; INTRAVENOUS; SUBCUTANEOUS
Status: DISCONTINUED | OUTPATIENT
Start: 2025-02-12 | End: 2025-02-13 | Stop reason: HOSPADM

## 2025-02-12 RX ORDER — PROCHLORPERAZINE MALEATE 10 MG
10 TABLET ORAL EVERY 6 HOURS PRN
Status: DISCONTINUED | OUTPATIENT
Start: 2025-02-12 | End: 2025-02-13 | Stop reason: HOSPADM

## 2025-02-12 RX ORDER — FENTANYL CITRATE 50 UG/ML
INJECTION, SOLUTION INTRAMUSCULAR; INTRAVENOUS PRN
Status: DISCONTINUED | OUTPATIENT
Start: 2025-02-12 | End: 2025-02-12 | Stop reason: HOSPADM

## 2025-02-12 RX ORDER — ONDANSETRON 2 MG/ML
4 INJECTION INTRAMUSCULAR; INTRAVENOUS EVERY 6 HOURS PRN
Status: DISCONTINUED | OUTPATIENT
Start: 2025-02-12 | End: 2025-02-13 | Stop reason: HOSPADM

## 2025-02-12 NOTE — H&P
Pre-Endoscopy History and Physical     Valeriano Rendon MRN# [id number]   YOB: 1970 Age: 54 year old     Date of Procedure: 2/12/2025  Primary care provider: Cosme Parham  Type of Endoscopy: colonoscopy  Reason for Procedure: Screening  Type of Anesthesia Anticipated: Moderate Sedation    HPI:    Valeriano is a 54 year old male who will be undergoing the above procedure.      A history and physical has been performed. The patient's medications and allergies have been reviewed. The risks and benefits of the procedure and the sedation options and risks were discussed with the patient.  I specifically discussed about possible sedation medication reaction, bleeding, and one of the more rare complications of perforation.  All questions were answered and informed consent was obtained.      He denies a personal or family history of anesthesia complications or bleeding disorders.     Allergies   Allergen Reactions    Morphine And Codeine Nausea and Vomiting    Vicodin [Hydrocodone-Acetaminophen] GI Disturbance        Cannot display prior to admission medications because the patient has not been admitted in this contact.       Patient Active Problem List   Diagnosis    CARDIOVASCULAR SCREENING; LDL GOAL LESS THAN 130    Encounter for tobacco use cessation counseling    Family history of melanoma    Chronic right-sided low back pain without sciatica    Strain of neck muscle, subsequent encounter    Myalgia, multiple sites    Stiffness of joints of both hands    Abnormal CT lung screening        History reviewed. No pertinent past medical history.     History reviewed. No pertinent surgical history.    Social History     Tobacco Use    Smoking status: Every Day     Types: Vaping Device    Smokeless tobacco: Former    Tobacco comments:     Quiet cigarettes 4 years ago- uses nicotine vapes   Substance Use Topics    Alcohol use: No     Alcohol/week: 0.0 standard drinks of alcohol       Family  "History   Problem Relation Age of Onset    Melanoma Other         maternal aunt and uncle    Skin Cancer No family hx of        REVIEW OF SYSTEMS:     5 point ROS negative except as noted above in HPI, including Gen., Resp., CV, GI &  system review.      PHYSICAL EXAM:   /69   Pulse 77   Temp 97.2  F (36.2  C) (Temporal)   Resp 16   SpO2 99%  Estimated body mass index is 27.17 kg/m  as calculated from the following:    Height as of 1/29/25: 1.638 m (5' 4.5\").    Weight as of 1/29/25: 72.9 kg (160 lb 12.8 oz).   GENERAL APPEARANCE: healthy, alert, and no distress  MENTAL STATUS: alert and oriented x 3  AIRWAY EXAM: Mallampatti Class II (visualization of the soft palate, fauces, and uvula)  RESP: lungs clear to auscultation - no rales, rhonchi or wheezes  CV: regular rates and rhythm      DIAGNOSTICS:    Not indicated      IMPRESSION   ASA Class 2 - Mild systemic disease        PLAN:       Plan for colonoscopy. We discussed the risks, benefits and alternatives and the patient wished to proceed.    The above has been forwarded to the consulting provider.      Signed Electronically by: Girish Henry DO  February 12, 2025   "

## 2025-03-10 ENCOUNTER — TELEPHONE (OUTPATIENT)
Dept: SURGERY | Facility: CLINIC | Age: 55
End: 2025-03-10
Payer: COMMERCIAL

## 2025-03-10 PROBLEM — K42.9 UMBILICAL HERNIA WITHOUT OBSTRUCTION AND WITHOUT GANGRENE: Status: ACTIVE | Noted: 2025-01-09

## 2025-03-10 PROBLEM — K40.20 BILATERAL INGUINAL HERNIA WITHOUT OBSTRUCTION OR GANGRENE, RECURRENCE NOT SPECIFIED: Status: ACTIVE | Noted: 2025-01-09

## 2025-03-10 NOTE — TELEPHONE ENCOUNTER
Patient is scheduled for surgery with Dr. Jerry Ruth    Spoke with: Blayne    Date of Surgery: Thursday May 1, 2025    Location: [facility] OR    Informed patient they will need an adult  YES    Pre op with Provider Dr. Ruth    H&P: Scheduled with PCP Dr. Cosme Parham    Surgery packet: sending via [employer] as per patient request     Additional comments:   - patient is moving in mid-April, so he either wanted a March DOS or a DOS after 4/25/2025      Radha melchor on 3/10/2025 at 9:05 AM

## 2025-04-17 ENCOUNTER — OFFICE VISIT (OUTPATIENT)
Dept: FAMILY MEDICINE | Facility: CLINIC | Age: 55
End: 2025-04-17
Payer: COMMERCIAL

## 2025-04-17 VITALS
RESPIRATION RATE: 18 BRPM | WEIGHT: 161 LBS | HEART RATE: 96 BPM | HEIGHT: 64 IN | TEMPERATURE: 98 F | OXYGEN SATURATION: 95 % | DIASTOLIC BLOOD PRESSURE: 72 MMHG | BODY MASS INDEX: 27.49 KG/M2 | SYSTOLIC BLOOD PRESSURE: 107 MMHG

## 2025-04-17 DIAGNOSIS — Z01.818 PRE-OP EXAM: Primary | ICD-10-CM

## 2025-04-17 DIAGNOSIS — K40.20 BILATERAL INGUINAL HERNIA WITHOUT OBSTRUCTION OR GANGRENE, RECURRENCE NOT SPECIFIED: ICD-10-CM

## 2025-04-17 DIAGNOSIS — E78.00 HYPERCHOLESTEREMIA: ICD-10-CM

## 2025-04-17 DIAGNOSIS — M25.512 CHRONIC LEFT SHOULDER PAIN: ICD-10-CM

## 2025-04-17 DIAGNOSIS — G89.29 CHRONIC LEFT SHOULDER PAIN: ICD-10-CM

## 2025-04-17 DIAGNOSIS — Z23 NEED FOR VARICELLA VACCINE: ICD-10-CM

## 2025-04-17 LAB
ERYTHROCYTE [DISTWIDTH] IN BLOOD BY AUTOMATED COUNT: 11.6 % (ref 10–15)
HCT VFR BLD AUTO: 38.5 % (ref 40–53)
HGB BLD-MCNC: 13.3 G/DL (ref 13.3–17.7)
MCH RBC QN AUTO: 32 PG (ref 26.5–33)
MCHC RBC AUTO-ENTMCNC: 34.5 G/DL (ref 31.5–36.5)
MCV RBC AUTO: 93 FL (ref 78–100)
PLATELET # BLD AUTO: 199 10E3/UL (ref 150–450)
RBC # BLD AUTO: 4.15 10E6/UL (ref 4.4–5.9)
WBC # BLD AUTO: 9.1 10E3/UL (ref 4–11)

## 2025-04-17 RX ORDER — ATORVASTATIN CALCIUM 20 MG/1
20 TABLET, FILM COATED ORAL DAILY
Qty: 90 TABLET | Refills: 3 | Status: SHIPPED | OUTPATIENT
Start: 2025-04-17

## 2025-04-17 NOTE — PROGRESS NOTES
Preoperative Evaluation  [facility]  [address]  [address]  [facility] [address]  Phone: [phone]  Fax: [phone]  Primary Provider: Cosme Parham MD  Pre-op Performing Provider: Cosme Parham MD  Apr 17, 2025 4/17/2025   Surgical Information   What procedure is being done?hernia repair pre op physical   Facility or Hospital where procedure/surgery will be performed:[facility]   Who is doing the procedure / surgery?Dr.Granja dr montero   Date of surgery / procedure:5/1/25 288496   Time of surgery / procedure: unknown   Where do you plan to recover after surgery?home at home with family     Fax number for surgical facility: Note does not need to be faxed, will be available electronically in Epic.    Assessment & Plan     The proposed surgical procedure is considered LOW risk.    Pre-op exam  Ok for procedure  - CBC with platelets; Future  - CBC with platelets    Bilateral inguinal hernia without obstruction or gangrene, recurrence not specified  See surgical consult  - CBC with platelets; Future  - CBC with platelets    Hypercholesteremia  Start after procedure  Follow up in 6 months.   - atorvastatin (LIPITOR) 20 MG tablet; Take 1 tablet (20 mg) by mouth daily.    Need for varicella vaccine  Has no recall if he has disease  Terat sliding scale indicted  - Varicella Zoster Virus Antibody IgG; Future  - Varicella Zoster Virus Antibody IgG    Chronic left shoulder pain  Not improving  - Orthopedic  Referral; Future            - No identified additional risk factors other than previously addressed         Recommendation  Approval given to proceed with proposed procedure, without further diagnostic evaluation.        Roxanna Cisneros is a 54 year old, presenting for the following:  Pre-Op Exam (Hernia repair on 5/1/25 with  at [facility]  [place]//)        HPI: Valeriano Rendon is a 54 year old male with a  history of a right inguinal mass with the following symptoms of lump and pain.            4/17/2025   Pre-Op Questionnaire   Have you ever had a heart attack or stroke? No   Have you ever had surgery on your heart or blood vessels, such as a stent placement, a coronary artery bypass, or surgery on an artery in your head, neck, heart, or legs? No   Do you have chest pain with activity? No   Do you have a history of heart failure? No   Do you currently have a cold, bronchitis or symptoms of other infection? No   Do you have a cough, shortness of breath, or wheezing? No   Do you or anyone in your family have previous history of blood clots? (!) UNKNOWN    Do you or does anyone in your family have a serious bleeding problem such as prolonged bleeding following surgeries or cuts? (!) UNKNOWN    Have you ever had problems with anemia or been told to take iron pills? No   Have you had any abnormal blood loss such as black, tarry or bloody stools? No   Have you ever had a blood transfusion? No   Are you willing to have a blood transfusion if it is medically needed before, during, or after your surgery? Yes   Have you or any of your relatives ever had problems with anesthesia? (!) UNKNOWN    Do you have sleep apnea, excessive snoring or daytime drowsiness? No   Do you have any artifical heart valves or other implanted medical devices like a pacemaker, defibrillator, or continuous glucose monitor? No   Do you have artificial joints? No   Are you allergic to latex? No     Advance Care Planning        Preoperative Review of    reviewed - no record of controlled substances prescribed.      Status of Chronic Conditions:  HYPERLIPIDEMIA - Patient has a long history of significant Hyperlipidemia , will start medication first      Patient Active Problem List    Diagnosis Date Noted    Abnormal CT lung screening 01/17/2025     Priority: Medium      Referred to [facility] by [facility] Results Team.       Bilateral inguinal hernia without obstruction or gangrene, recurrence not specified 01/09/2025     Priority: Medium    Umbilical hernia without obstruction and without gangrene 01/09/2025     Priority: Medium    Strain of neck muscle, subsequent encounter 12/18/2024     Priority: Medium    Myalgia, multiple sites 12/18/2024     Priority: Medium    Stiffness of joints of both hands 12/18/2024     Priority: Medium    Chronic right-sided low back pain without sciatica 08/07/2017     Priority: Medium    Family history of melanoma 10/12/2016     Priority: Medium    CARDIOVASCULAR SCREENING; LDL GOAL LESS THAN 130 05/09/2016     Priority: Medium    Encounter for tobacco use cessation counseling 05/09/2016     Priority: Medium      No past medical history on file.  Past Surgical History:   Procedure Laterality Date    COLONOSCOPY N/A 2/12/2025    Procedure: COLONOSCOPY, WITH POLYPECTOMY AND BIOPSY;  Surgeon: Girish Henry DO;  Location: [facility] OR    COLONOSCOPY WITH CO2 INSUFFLATION N/A 2/12/2025    Procedure: Colonoscopy with CO2 insufflation;  Surgeon: Girish Henry DO;  Location: [facility] OR     Current Outpatient Medications   Medication Sig Dispense Refill    ibuprofen (ADVIL/MOTRIN) 800 MG tablet Take 1 tablet (800 mg) by mouth every 8 hours as needed for moderate pain 30 tablet 1    fluticasone (FLONASE) 50 MCG/ACT nasal spray Spray 1 spray into both nostrils daily. (Patient not taking: Reported on 4/17/2025) 16 g 1    ketoconazole (NIZORAL) 2 % external cream Apply topically daily. (Patient not taking: Reported on 4/17/2025) 60 g 1       Allergies   Allergen Reactions    Morphine And Codeine Nausea and Vomiting    Vicodin [Hydrocodone-Acetaminophen] GI Disturbance        Social History     Tobacco Use    Smoking status: Every Day     Types: Vaping Device    Smokeless tobacco: Former    Tobacco comments:     Quiet cigarettes 4 years  "ago- uses nicotine vapes   Substance Use Topics    Alcohol use: No     Alcohol/week: 0.0 standard drinks of alcohol       History   Drug Use     Comment: marijuana             Review of Systems  Constitutional, HEENT, cardiovascular, pulmonary, gi and gu systems are negative, except as otherwise noted.    Objective    /72 (BP Location: Left arm, Patient Position: Sitting, Cuff Size: Adult Regular)   Pulse 96   Temp 98  F (36.7  C) (Temporal)   Resp 18   Ht 1.637 m (5' 4.45\")   Wt 73 kg (161 lb)   SpO2 95%   BMI 27.25 kg/m     Estimated body mass index is 27.25 kg/m  as calculated from the following:    Height as of this encounter: 1.637 m (5' 4.45\").    Weight as of this encounter: 73 kg (161 lb).  Physical Exam  GENERAL: alert and no distress  EYES: Eyes grossly normal to inspection, PERRL and conjunctivae and sclerae normal  HENT: ear canals and TM's normal, nose and mouth without ulcers or lesions  NECK: no adenopathy, no asymmetry, masses, or scars  RESP: lungs clear to auscultation - no rales, rhonchi or wheezes  CV: regular rate and rhythm, normal S1 S2, no S3 or S4, no murmur, click or rub, no peripheral edema  ABDOMEN: soft, nontender, no hepatosplenomegaly, and bowel sounds normal  MS: no gross musculoskeletal defects noted, no edema  SKIN: no suspicious lesions or rashes  NEURO: Normal strength and tone, mentation intact and speech normal  BACK: no CVA tenderness, no paralumbar tenderness  PSYCH: mentation appears normal, affect normal/bright    Recent Labs   Lab Test 12/16/24  0937      POTASSIUM 4.5   CR 0.94   A1C 5.8*        Diagnostics  Recent Results (from the past 720 hours)   CBC with platelets    Collection Time: 04/17/25  2:38 PM   Result Value Ref Range    WBC Count 9.1 4.0 - 11.0 10e3/uL    RBC Count 4.15 (L) 4.40 - 5.90 10e6/uL    Hemoglobin 13.3 13.3 - 17.7 g/dL    Hematocrit 38.5 (L) 40.0 - 53.0 %    MCV 93 78 - 100 fL    MCH 32.0 26.5 - 33.0 pg    MCHC 34.5 31.5 - 36.5 " g/dL    RDW 11.6 10.0 - 15.0 %    Platelet Count 199 150 - 450 10e3/uL      No EKG required for low risk surgery (cataract, skin procedure, breast biopsy, etc).    Revised Cardiac Risk Index (RCRI)  The patient has the following serious cardiovascular risks for perioperative complications:   - No serious cardiac risks = 0 points     RCRI Interpretation: 0 points: Class I (very low risk - 0.4% complication rate)         Signed Electronically by: Cosme Parham MD  A copy of this evaluation report is provided to the requesting physician.

## 2025-04-21 ENCOUNTER — PATIENT OUTREACH (OUTPATIENT)
Dept: CARE COORDINATION | Facility: CLINIC | Age: 55
End: 2025-04-21
Payer: COMMERCIAL

## 2025-04-24 ENCOUNTER — ONCOLOGY VISIT (OUTPATIENT)
Dept: PULMONOLOGY | Facility: CLINIC | Age: 55
End: 2025-04-24
Attending: STUDENT IN AN ORGANIZED HEALTH CARE EDUCATION/TRAINING PROGRAM
Payer: COMMERCIAL

## 2025-04-24 ENCOUNTER — ANCILLARY PROCEDURE (OUTPATIENT)
Dept: CT IMAGING | Facility: CLINIC | Age: 55
End: 2025-04-24
Attending: STUDENT IN AN ORGANIZED HEALTH CARE EDUCATION/TRAINING PROGRAM
Payer: COMMERCIAL

## 2025-04-24 VITALS
HEART RATE: 65 BPM | SYSTOLIC BLOOD PRESSURE: 119 MMHG | TEMPERATURE: 98.1 F | WEIGHT: 162 LBS | BODY MASS INDEX: 27.42 KG/M2 | DIASTOLIC BLOOD PRESSURE: 73 MMHG | RESPIRATION RATE: 18 BRPM | OXYGEN SATURATION: 100 %

## 2025-04-24 DIAGNOSIS — R91.8 ABNORMAL CT LUNG SCREENING: ICD-10-CM

## 2025-04-24 DIAGNOSIS — R91.1 LUNG NODULE: Primary | ICD-10-CM

## 2025-04-24 PROCEDURE — G0463 HOSPITAL OUTPT CLINIC VISIT: HCPCS | Performed by: STUDENT IN AN ORGANIZED HEALTH CARE EDUCATION/TRAINING PROGRAM

## 2025-04-24 PROCEDURE — 71250 CT THORAX DX C-: CPT | Mod: GC | Performed by: RADIOLOGY

## 2025-04-24 ASSESSMENT — PAIN SCALES - GENERAL: PAINLEVEL_OUTOF10: NO PAIN (0)

## 2025-04-24 NOTE — PROGRESS NOTES
LUNG NODULE & INTERVENTIONAL PULMONARY CLINIC  [facility]    Valeriano Rendon MRN# [id number]   Age: 54 year old YOB: 1970     Reason for Consultation: Lung nodule    Requesting Physician: Referred Self, MD  No address on file     Assessment and Plan:    Valeriano Rendon is a 54 year old male who presents for evaluation of a lung nodule.    I independently reviewed their CT scan from 4/24/25 which reveals a stable appearing RLL nodule. Suspect this is rounded atelectasis.     Plan to repeat CT with follow up in 6 months.       Patient indicated understanding and agreed to the plan of care. All questions answered.     Fareed Danielson DO   of Medicine  Interventional Pulmonology  Department of Pulmonary, Allergy, Critical Care and Sleep Medicine   [facility]     History:  Valeriano Rendon is a 54 year old male who presents for evaluation of a lung nodule.   No signifant changes to his health.   Getting an MRI of his neck/shoulder.     Medications:  Current Outpatient Medications   Medication Sig Dispense Refill    atorvastatin (LIPITOR) 20 MG tablet Take 1 tablet (20 mg) by mouth daily. 90 tablet 3     No current facility-administered medications for this visit.         Physical exam:  /73   Pulse 65   Temp 98.1  F (36.7  C)   Resp 18   Wt 73.5 kg (162 lb)   SpO2 100%   BMI 27.42 kg/m    Wt Readings from Last 4 Encounters:   04/24/25 73.5 kg (162 lb)   04/17/25 73 kg (161 lb)   01/29/25 72.9 kg (160 lb 12.8 oz)   01/23/25 73.9 kg (163 lb)     General: Well appearing, nonlabored breathing  Neuro: Answering questions appropriately  Psych: Normal affect

## 2025-04-24 NOTE — NURSING NOTE
"Oncology Rooming Note    April 24, 2025 9:24 AM   Valeriano Rendon is a 54 year old male who presents for:    Chief Complaint   Patient presents with    Oncology Clinic Visit     Lung nodules     Initial Vitals: /73   Pulse 65   Temp 98.1  F (36.7  C)   Resp 18   Wt 73.5 kg (162 lb)   SpO2 100%   BMI 27.42 kg/m   Estimated body mass index is 27.42 kg/m  as calculated from the following:    Height as of 4/17/25: 1.637 m (5' 4.45\").    Weight as of this encounter: 73.5 kg (162 lb). Body surface area is 1.83 meters squared.  No Pain (0) Comment: Data Unavailable   No LMP for male patient.  Allergies reviewed: Yes  Medications reviewed: Yes    Medications: Medication refills not needed today.  Pharmacy name entered into EPIC:    [facility] [place], [place] - [address] [place] [address] [address] [id number] [place], [place] - [address] [facility] [address] [facility] [id number] [place], [place] - [address]    Frailty Screening:   Is the patient here for a new oncology consult visit in cancer care? 2. No    PHQ9:  Did this patient require a PHQ9?: No      Clinical concerns:        Aurora Perez              "

## 2025-04-28 ENCOUNTER — ANESTHESIA EVENT (OUTPATIENT)
Dept: SURGERY | Facility: AMBULATORY SURGERY CENTER | Age: 55
End: 2025-04-28
Payer: COMMERCIAL

## 2025-04-30 ASSESSMENT — LIFESTYLE VARIABLES: TOBACCO_USE: 1

## 2025-04-30 NOTE — ANESTHESIA PREPROCEDURE EVALUATION
Anesthesia Pre-Procedure Evaluation    Patient: Valeriano Rendon   MRN: [id number] : 1970        Procedure : Procedure(s):  HERNIORRHAPHY, BILATERAL INGUINAL, ROBOT-ASSISTED  HERNIORRHAPHY, UMBILICAL, OPEN          No past medical history on file.   Past Surgical History:   Procedure Laterality Date     COLONOSCOPY N/A 2025    Procedure: COLONOSCOPY, WITH POLYPECTOMY AND BIOPSY;  Surgeon: Girish Henry DO;  Location: [facility]     COLONOSCOPY WITH CO2 INSUFFLATION N/A 2025    Procedure: Colonoscopy with CO2 insufflation;  Surgeon: Girish Henry DO;  Location: [facility] OR      Allergies   Allergen Reactions     Morphine And Codeine Nausea and Vomiting     Vicodin [Hydrocodone-Acetaminophen] GI Disturbance      Social History     Tobacco Use     Smoking status: Every Day     Types: Vaping Device     Smokeless tobacco: Former     Tobacco comments:     Quiet cigarettes 4 years ago- uses nicotine vapes   Substance Use Topics     Alcohol use: No     Alcohol/week: 0.0 standard drinks of alcohol      Wt Readings from Last 1 Encounters:   25 73.5 kg (162 lb)        Anesthesia Evaluation   Pt has had prior anesthetic. Type: MAC.        ROS/MED HX  ENT/Pulmonary: Comment: Still vapes nicotine    (+)                tobacco use, Past use,                       Neurologic:  - neg neurologic ROS     Cardiovascular:       METS/Exercise Tolerance:     Hematologic:       Musculoskeletal: Comment: Left shoulder pain, chronic      GI/Hepatic:  - neg GI/hepatic ROS     Renal/Genitourinary:  - neg Renal ROS     Endo:  - neg endo ROS     Psychiatric/Substance Use:     (+)     Recreational drug usage: Cannabis.    Infectious Disease:  - neg infectious disease ROS     Malignancy:  - neg malignancy ROS     Other: Comment: Bilat inguinal hernia           Physical Exam    Airway        Mallampati: II   TM distance: > 3 FB   Neck ROM: full   Mouth opening: > 3 cm    Respiratory Devices and Support         Dental      "  (+) Minor Abnormalities - some fillings, tiny chips      Cardiovascular   cardiovascular exam normal       Rhythm and rate: regular     Pulmonary   pulmonary exam normal        breath sounds clear to auscultation       OUTSIDE LABS:  CBC:   Lab Results   Component Value Date    WBC 9.1 04/17/2025    HGB 13.3 04/17/2025    HCT 38.5 (L) 04/17/2025     04/17/2025     BMP:   Lab Results   Component Value Date     12/16/2024     05/09/2016    POTASSIUM 4.5 12/16/2024    POTASSIUM 3.7 05/09/2016    CHLORIDE 102 12/16/2024    CHLORIDE 105 05/09/2016    CO2 27 12/16/2024    CO2 27 05/09/2016    BUN 20.1 (H) 12/16/2024    BUN 12 05/09/2016    CR 0.94 12/16/2024    CR 0.87 05/09/2016     (H) 12/16/2024    GLC 91 05/09/2016     COAGS: No results found for: \"PTT\", \"INR\", \"FIBR\"  POC: No results found for: \"BGM\", \"HCG\", \"HCGS\"  HEPATIC:   Lab Results   Component Value Date    ALBUMIN 4.1 12/16/2024    PROTTOTAL 7.7 12/16/2024    ALT 13 12/16/2024    AST 19 12/16/2024    ALKPHOS 106 12/16/2024    BILITOTAL 0.2 12/16/2024     OTHER:   Lab Results   Component Value Date    A1C 5.8 (H) 12/16/2024    LIANET 9.3 12/16/2024    TSH 1.34 12/16/2024       Anesthesia Plan    ASA Status:  2    NPO Status:  NPO Appropriate    Anesthesia Type: General.     - Airway: ETT   Induction: Intravenous.   Maintenance: TIVA.        Consents    Anesthesia Plan(s) and associated risks, benefits, and realistic alternatives discussed. Questions answered and patient/representative(s) expressed understanding.     - Discussed:     - Discussed with:  Patient      - Extended Intubation/Ventilatory Support Discussed: No.      - Patient is DNR/DNI Status: No     Use of blood products discussed: No .     Postoperative Care    Pain management: Multi-modal analgesia.   PONV prophylaxis: Ondansetron (or other 5HT-3), Dexamethasone or Solumedrol, Background Propofol Infusion     Comments:               Leah Garrett MD    Clinically " "Significant Risk Factors Present on Admission                             # Overweight: Estimated body mass index is 27.42 kg/m  as calculated from the following:    Height as of 4/17/25: 1.637 m (5' 4.45\").    Weight as of 4/24/25: 73.5 kg (162 lb).                "

## 2025-05-01 ENCOUNTER — ANESTHESIA (OUTPATIENT)
Dept: SURGERY | Facility: AMBULATORY SURGERY CENTER | Age: 55
End: 2025-05-01
Payer: COMMERCIAL

## 2025-05-01 ENCOUNTER — HOSPITAL ENCOUNTER (OUTPATIENT)
Facility: AMBULATORY SURGERY CENTER | Age: 55
Discharge: HOME | End: 2025-05-01
Attending: SURGERY
Payer: COMMERCIAL

## 2025-05-01 VITALS
HEIGHT: 65 IN | TEMPERATURE: 98.6 F | BODY MASS INDEX: 26.66 KG/M2 | SYSTOLIC BLOOD PRESSURE: 131 MMHG | DIASTOLIC BLOOD PRESSURE: 80 MMHG | WEIGHT: 160 LBS | HEART RATE: 67 BPM | RESPIRATION RATE: 18 BRPM | OXYGEN SATURATION: 100 %

## 2025-05-01 DIAGNOSIS — K40.20 BILATERAL INGUINAL HERNIA WITHOUT OBSTRUCTION OR GANGRENE, RECURRENCE NOT SPECIFIED: Primary | ICD-10-CM

## 2025-05-01 RX ORDER — DEXAMETHASONE SODIUM PHOSPHATE 10 MG/ML
4 INJECTION, SOLUTION INTRAMUSCULAR; INTRAVENOUS
Status: ACTIVE | OUTPATIENT
Start: 2025-05-01

## 2025-05-01 RX ORDER — HYDROMORPHONE HYDROCHLORIDE 1 MG/ML
0.2 INJECTION, SOLUTION INTRAMUSCULAR; INTRAVENOUS; SUBCUTANEOUS EVERY 5 MIN PRN
Status: DISCONTINUED | OUTPATIENT
Start: 2025-05-01 | End: 2025-05-01 | Stop reason: HOSPADM

## 2025-05-01 RX ORDER — ONDANSETRON 2 MG/ML
4 INJECTION INTRAMUSCULAR; INTRAVENOUS EVERY 30 MIN PRN
Status: ACTIVE | OUTPATIENT
Start: 2025-05-01

## 2025-05-01 RX ORDER — OXYCODONE HYDROCHLORIDE 5 MG/1
10 TABLET ORAL
Status: ACTIVE | OUTPATIENT
Start: 2025-05-01

## 2025-05-01 RX ORDER — PROPOFOL 10 MG/ML
INJECTION, EMULSION INTRAVENOUS CONTINUOUS PRN
Status: DISCONTINUED | OUTPATIENT
Start: 2025-05-01 | End: 2025-05-01

## 2025-05-01 RX ORDER — PROPOFOL 10 MG/ML
INJECTION, EMULSION INTRAVENOUS PRN
Status: DISCONTINUED | OUTPATIENT
Start: 2025-05-01 | End: 2025-05-01

## 2025-05-01 RX ORDER — ONDANSETRON 2 MG/ML
INJECTION INTRAMUSCULAR; INTRAVENOUS PRN
Status: DISCONTINUED | OUTPATIENT
Start: 2025-05-01 | End: 2025-05-01

## 2025-05-01 RX ORDER — LABETALOL HYDROCHLORIDE 5 MG/ML
10 INJECTION, SOLUTION INTRAVENOUS
Status: DISCONTINUED | OUTPATIENT
Start: 2025-05-01 | End: 2025-05-01 | Stop reason: HOSPADM

## 2025-05-01 RX ORDER — HYDROMORPHONE HYDROCHLORIDE 1 MG/ML
0.4 INJECTION, SOLUTION INTRAMUSCULAR; INTRAVENOUS; SUBCUTANEOUS EVERY 5 MIN PRN
Status: DISCONTINUED | OUTPATIENT
Start: 2025-05-01 | End: 2025-05-01 | Stop reason: HOSPADM

## 2025-05-01 RX ORDER — SODIUM CHLORIDE, SODIUM LACTATE, POTASSIUM CHLORIDE, CALCIUM CHLORIDE 600; 310; 30; 20 MG/100ML; MG/100ML; MG/100ML; MG/100ML
INJECTION, SOLUTION INTRAVENOUS CONTINUOUS
Status: DISCONTINUED | OUTPATIENT
Start: 2025-05-01 | End: 2025-05-01 | Stop reason: HOSPADM

## 2025-05-01 RX ORDER — DEXAMETHASONE SODIUM PHOSPHATE 10 MG/ML
4 INJECTION, SOLUTION INTRAMUSCULAR; INTRAVENOUS
Status: DISCONTINUED | OUTPATIENT
Start: 2025-05-01 | End: 2025-05-01 | Stop reason: HOSPADM

## 2025-05-01 RX ORDER — ONDANSETRON 2 MG/ML
4 INJECTION INTRAMUSCULAR; INTRAVENOUS EVERY 30 MIN PRN
Status: DISCONTINUED | OUTPATIENT
Start: 2025-05-01 | End: 2025-05-01 | Stop reason: HOSPADM

## 2025-05-01 RX ORDER — DEXAMETHASONE SODIUM PHOSPHATE 4 MG/ML
INJECTION, SOLUTION INTRA-ARTICULAR; INTRALESIONAL; INTRAMUSCULAR; INTRAVENOUS; SOFT TISSUE PRN
Status: DISCONTINUED | OUTPATIENT
Start: 2025-05-01 | End: 2025-05-01

## 2025-05-01 RX ORDER — LIDOCAINE 40 MG/G
CREAM TOPICAL
Status: DISCONTINUED | OUTPATIENT
Start: 2025-05-01 | End: 2025-05-01 | Stop reason: HOSPADM

## 2025-05-01 RX ORDER — LIDOCAINE HYDROCHLORIDE 20 MG/ML
INJECTION, SOLUTION INFILTRATION; PERINEURAL PRN
Status: DISCONTINUED | OUTPATIENT
Start: 2025-05-01 | End: 2025-05-01

## 2025-05-01 RX ORDER — ACETAMINOPHEN 500 MG
500-1000 TABLET ORAL EVERY 6 HOURS PRN
COMMUNITY

## 2025-05-01 RX ORDER — FENTANYL CITRATE 50 UG/ML
INJECTION, SOLUTION INTRAMUSCULAR; INTRAVENOUS PRN
Status: DISCONTINUED | OUTPATIENT
Start: 2025-05-01 | End: 2025-05-01

## 2025-05-01 RX ORDER — CEFAZOLIN SODIUM 2 G/50ML
2 SOLUTION INTRAVENOUS SEE ADMIN INSTRUCTIONS
Status: DISCONTINUED | OUTPATIENT
Start: 2025-05-01 | End: 2025-05-01 | Stop reason: HOSPADM

## 2025-05-01 RX ORDER — HYDROCODONE BITARTRATE AND ACETAMINOPHEN 5; 325 MG/1; MG/1
1-2 TABLET ORAL EVERY 4 HOURS PRN
Qty: 10 TABLET | Refills: 0 | Status: SHIPPED | OUTPATIENT
Start: 2025-05-01

## 2025-05-01 RX ORDER — ACETAMINOPHEN 325 MG/1
975 TABLET ORAL ONCE
Status: COMPLETED | OUTPATIENT
Start: 2025-05-01 | End: 2025-05-01

## 2025-05-01 RX ORDER — ONDANSETRON 4 MG/1
4 TABLET, ORALLY DISINTEGRATING ORAL EVERY 30 MIN PRN
Status: DISCONTINUED | OUTPATIENT
Start: 2025-05-01 | End: 2025-05-01 | Stop reason: HOSPADM

## 2025-05-01 RX ORDER — NALOXONE HYDROCHLORIDE 0.4 MG/ML
0.1 INJECTION, SOLUTION INTRAMUSCULAR; INTRAVENOUS; SUBCUTANEOUS
Status: DISCONTINUED | OUTPATIENT
Start: 2025-05-01 | End: 2025-05-01 | Stop reason: HOSPADM

## 2025-05-01 RX ORDER — ONDANSETRON 4 MG/1
4 TABLET, ORALLY DISINTEGRATING ORAL EVERY 30 MIN PRN
Status: ACTIVE | OUTPATIENT
Start: 2025-05-01

## 2025-05-01 RX ORDER — NALOXONE HYDROCHLORIDE 0.4 MG/ML
0.1 INJECTION, SOLUTION INTRAMUSCULAR; INTRAVENOUS; SUBCUTANEOUS
Status: ACTIVE | OUTPATIENT
Start: 2025-05-01

## 2025-05-01 RX ORDER — KETOROLAC TROMETHAMINE 30 MG/ML
INJECTION, SOLUTION INTRAMUSCULAR; INTRAVENOUS PRN
Status: DISCONTINUED | OUTPATIENT
Start: 2025-05-01 | End: 2025-05-01

## 2025-05-01 RX ORDER — BUPIVACAINE HYDROCHLORIDE 5 MG/ML
INJECTION, SOLUTION EPIDURAL; INTRACAUDAL; PERINEURAL PRN
Status: DISCONTINUED | OUTPATIENT
Start: 2025-05-01 | End: 2025-05-01 | Stop reason: HOSPADM

## 2025-05-01 RX ORDER — CEFAZOLIN SODIUM 2 G/50ML
2 SOLUTION INTRAVENOUS
Status: COMPLETED | OUTPATIENT
Start: 2025-05-01 | End: 2025-05-01

## 2025-05-01 RX ORDER — FENTANYL CITRATE 50 UG/ML
25 INJECTION, SOLUTION INTRAMUSCULAR; INTRAVENOUS EVERY 5 MIN PRN
Status: DISCONTINUED | OUTPATIENT
Start: 2025-05-01 | End: 2025-05-01 | Stop reason: HOSPADM

## 2025-05-01 RX ORDER — OXYCODONE HYDROCHLORIDE 5 MG/1
5 TABLET ORAL
Status: ACTIVE | OUTPATIENT
Start: 2025-05-01

## 2025-05-01 RX ORDER — FENTANYL CITRATE 50 UG/ML
50 INJECTION, SOLUTION INTRAMUSCULAR; INTRAVENOUS EVERY 5 MIN PRN
Status: DISCONTINUED | OUTPATIENT
Start: 2025-05-01 | End: 2025-05-01 | Stop reason: HOSPADM

## 2025-05-01 RX ADMIN — CEFAZOLIN SODIUM 2 G: 2 SOLUTION INTRAVENOUS at 08:27

## 2025-05-01 RX ADMIN — Medication 100 MCG: at 08:52

## 2025-05-01 RX ADMIN — Medication 0.5 MG: at 09:23

## 2025-05-01 RX ADMIN — SODIUM CHLORIDE, SODIUM LACTATE, POTASSIUM CHLORIDE, CALCIUM CHLORIDE: 600; 310; 30; 20 INJECTION, SOLUTION INTRAVENOUS at 07:45

## 2025-05-01 RX ADMIN — Medication 100 MCG: at 08:49

## 2025-05-01 RX ADMIN — Medication 50 MG: at 08:35

## 2025-05-01 RX ADMIN — KETOROLAC TROMETHAMINE 15 MG: 30 INJECTION, SOLUTION INTRAMUSCULAR; INTRAVENOUS at 09:49

## 2025-05-01 RX ADMIN — FENTANYL CITRATE 100 MCG: 50 INJECTION, SOLUTION INTRAMUSCULAR; INTRAVENOUS at 08:32

## 2025-05-01 RX ADMIN — DEXAMETHASONE SODIUM PHOSPHATE 4 MG: 4 INJECTION, SOLUTION INTRA-ARTICULAR; INTRALESIONAL; INTRAMUSCULAR; INTRAVENOUS; SOFT TISSUE at 08:44

## 2025-05-01 RX ADMIN — ONDANSETRON 4 MG: 2 INJECTION INTRAMUSCULAR; INTRAVENOUS at 09:48

## 2025-05-01 RX ADMIN — Medication 100 MCG: at 09:14

## 2025-05-01 RX ADMIN — Medication 20 MG: at 09:22

## 2025-05-01 RX ADMIN — ACETAMINOPHEN 975 MG: 325 TABLET ORAL at 07:45

## 2025-05-01 RX ADMIN — PROPOFOL 200 MG: 10 INJECTION, EMULSION INTRAVENOUS at 08:32

## 2025-05-01 RX ADMIN — PROPOFOL 200 MCG/KG/MIN: 10 INJECTION, EMULSION INTRAVENOUS at 08:38

## 2025-05-01 RX ADMIN — LIDOCAINE HYDROCHLORIDE 100 MG: 20 INJECTION, SOLUTION INFILTRATION; PERINEURAL at 08:34

## 2025-05-01 NOTE — ANESTHESIA PROCEDURE NOTES
Airway       Patient location during procedure: OR       Procedure Start/Stop Times: 5/1/2025 8:39 AM  Staff -        CRNA: Daly Curry APRN CRNA       Performed By: CRNA  Consent for Airway        Urgency: elective  Indications and Patient Condition       Indications for airway management: varsha-procedural and airway protection       Induction type:intravenous       Mask difficulty assessment: 1 - vent by mask    Final Airway Details       Final airway type: endotracheal airway       Successful airway: ETT - single and Oral  Endotracheal Airway Details        ETT size (mm): 7.5       Cuffed: yes       Cuff volume (mL): 8       Successful intubation technique: direct laryngoscopy       DL Blade Type: Hardy 2       Grade View of Cords: 1       Adjucts: stylet       Position: Right       Measured from: gums/teeth       Secured at (cm): 22       Bite block used: None    Post intubation assessment        Placement verified by: capnometry, equal breath sounds and chest rise        Number of attempts at approach: 1       Secured with: tape       Ease of procedure: easy       Dentition: Intact    Medication(s) Administered   Medication Administration Time: 5/1/2025 8:39 AM

## 2025-05-01 NOTE — DISCHARGE INSTRUCTIONS
How to Relieve Pain After Your Robotic or Laparoscopic Abdominal Surgery    After your surgery, you may have different types of pain.  It's normal to have pain near your incisions, but you may also feel bloated or have pain in different areas of your body, especially your shoulders.  This is from the gas that was put into your abdomen during your surgery.  The gas makes room for your surgeon to see, but it also puts pressure on the inside of your abdomen and can move to other areas.    The referred pain to your shoulders and bloating discomfort can improve with frequent short, non-strenuous walks (inside your home).  Try to start walking within 1 to 2 hours after surgery.  You can also place a hot pack or heating pad on the painful area (don't put it directly on your skin)  Lastly, the bloat and referred pain will dissipate with time as the body reabsorbs the gas that was placed in your abdomen.    If your pain isn't controlled after trying these things and taking pain medication, call your doctor.         [facility]  Home Care Following Anesthesia  For 24 hours after surgery:  Get plenty of rest.  A responsible adult must stay with you for at least 24 hours after you leave the surgery center.  Do not drive or use heavy equipment.  If you have weakness or tingling, don't drive or use heavy equipment until this feeling goes away.   Do not drink alcohol.   Avoid strenuous or risky activities.  Ask for help when climbing stairs.  You may feel lightheaded.  IF so, sit for a few minutes before standing.  Have someone help you get up.   If you have nausea (feel sick to your stomach): Drink only clear liquids such as apple juice, ginger ale, broth or 7-Up.  Rest may also help.  Be sure to drink enough fluids.  Move to a regular diet as you feel able.   You may have a slight fever.  Call the doctor if your fever is over 100 F (37.7 C) (taken under the tongue) or lasts longer than 24  "hours.  You may have a dry mouth, a sore throat, muscle aches or trouble sleeping. These should go away after 24 hours.  Do not make important or legal decisions.   It is recommended to avoid smoking.        Today you received a Marcaine or bupivacaine block to numb the nerves near your surgery site.  This is a block using local anesthetic or \"numbing\" medication injected around the nerves to anesthetize or \"numb\" the area supplied by those nerves.  This block is injected into the muscle layer near your surgical site.  The medication may numb the location where you had surgery for 6-18 hours, but may last up to 24 hours.  If your surgical site is an arm or leg you should be careful with your affected limb, since it is possible to injure your limb without being aware of it due to the numbing.  Until full feeling returns, you should guard against bumping or hitting your limb, and avoid extreme hot or cold temperatures on the skin.  As the block wears off, the feeling will return as a tingling or prickly sensation near your surgical site.  You will experience more discomfort from your incision as the feeling returns.  You may want to take a pain pill (a narcotic or Tylenol if this was prescribed by your surgeon) when you start to experience mild pain before the pain beccomes more severe.  If your pain medications do not control your pain you should notifiy your surgeon.    Tips for taking pain medications  To get the best pain relief possible, remember these points:  Take pain medications as directed, before pain becomes severe.  Pain medication can upset your stomach: taking it with food may help.  Constipation is a common side effect of pain medication. Drink plenty of  fluids.  Eat foods high in fiber. Take a stool softener if recommended by your doctor or pharmacist.  Do not drink alcohol, drive or operate machinery while taking pain medications.  Ask about other ways to control pain, such as with heat, ice or " relaxation.    Tylenol/Acetaminophen Consumption    If you feel your pain relief is insufficient, you may take Tylenol/Acetaminophen in addition to your narcotic pain medication.   Be careful not to exceed 4,000 mg of Tylenol/Acetaminophen in a 24 hour period from all sources.  If you are taking extra strength Tylenol/acetaminophen (500 mg), the maximum dose is 8 tablets in 24 hours.  If you are taking regular strength acetaminophen (325 mg), the maximum dose is 12 tablets in 24 hours.    Call a doctor for any of the following:  Signs of infection (fever, growing tenderness at the surgery site, a large amount of drainage or bleeding, severe pain, foul-smelling drainage, redness, swelling).  It has been over 8 to 10 hours since surgery and you are still not able to urinate (pass water).  Headache for over 24 hours.  Numbness, tingling or weakness the day after surgery (if you had spinal anesthesia).  Signs of Covid-19 infection (temperature over 100 degrees, shortness of breath, cough, loss of taste/smell, generalized body aches, persistent headache, chills, sore throat, nausea/vomiting/diarrhea)    Your doctor is:  Dr. Jerry Ruth, General Surgery: [phone]                  After hours and weekends call the hospital @ [phone] and ask for the resident on call for:  General Surgery  For emergency care, call the:  [facility]:  [phone] (TTY for hearing impaired: [phone])

## 2025-05-01 NOTE — ANESTHESIA CARE TRANSFER NOTE
Patient: Valeriano Rendon    Procedure: Procedure(s):  HERNIORRHAPHY, BILATERAL INGUINAL, ROBOT-ASSISTED  HERNIORRHAPHY, UMBILICAL, OPEN       Diagnosis: Bilateral inguinal hernia without obstruction or gangrene, recurrence not specified [K40.20]  Umbilical hernia without obstruction and without gangrene [K42.9]  Diagnosis Additional Information: No value filed.    Anesthesia Type:   General     Note:    Oropharynx: oropharynx clear of all foreign objects and spontaneously breathing  Level of Consciousness: awake  Oxygen Supplementation: face mask  Level of Supplemental Oxygen (L/min / FiO2): 6  Independent Airway: airway patency satisfactory and stable  Dentition: dentition unchanged  Vital Signs Stable: post-procedure vital signs reviewed and stable  Report to RN Given: handoff report given  Patient transferred to: PACU    Handoff Report: Identifed the Patient, Identified the Reponsible Provider, Reviewed the pertinent medical history, Discussed the surgical course, Reviewed Intra-OP anesthesia mangement and issues during anesthesia, Set expectations for post-procedure period and Allowed opportunity for questions and acknowledgement of understanding      Vitals:  Vitals Value Taken Time   BP     Temp 36.3    Pulse 76 05/01/25 1007   Resp 16 05/01/25 1007   SpO2 99 % 05/01/25 1007   Vitals shown include unfiled device data.    Electronically Signed By: RAYMOND Shipman CRNA  May 1, 2025  10:09 AM

## 2025-05-01 NOTE — OP NOTE
Operative report    Preop diagnosis: Bilateral inguinal hernia and umbilical hernia  Postop diagnosis: Same    Operative procedure: Laparoscopic bilateral inguinal hernioplasty, robot assisted and open mesh repair umbilical hernia    Surgeon: ABEL Ruth  Anesthesia: general endotracheal.    Indications for procedure: Patient presents with symptomatic bilateral inguinal hernia with an umbilical hernia found at visit  Full informed consent was obtain in clinic and reconfirmed in today's preoperative discussion.    Operative findings: Bilateral inguinal direct hernia, small umbilical hernia approximately 2 x 2 cm.    Procedure: The patient was brought to the operating room and put under general anesthesia. The abdomen widely prepped and draped in the usual sterile fashion. A supraumbilical skin incision was made after establishing pneumoperitoneum per my routine with veres needle. An 8mm post was placed supra umbilical and camera introduced. An 8 port was then placed at the right, and another 8 was placed at the left lateral per routine technique, and a Veress needle was placed suprapubic per routine technique. The  15 x 10 cm Parietex ProGrip were placed in the abdominal cavity under direct vision as was the 3-0 Stratafix. At this point, the robot was docked. Attention was turned to the console, where the peritoneum at the right than the left was opened to enter the preperitoneal space where the cord was peritonealized without difficulty after take down of hernia sac directs. The myopectineal orifice was well visualized with the mesh unrolled to completely cover the bilateral  myopectineal orifices with the mesh carried down to Maverick ligament both sides and just crossing midline under the Veress. The peritoneal defect were then closed with a running 3-0 Stratafix. The Veress was opened to atmosphere, and the abdomen was then insufflated to show good decompression of the preperitoneal space and good placement of the  mesh. At this point the needles were removed under direct vision. The abdomen then deflated, and ports were removed.  The umbilical hernia was then addressed per my routine using 2-0 PDS and 0 PDS closing the small defect with a small underlay of pariah Dick ProGrip mesh per routine.  Skin closed with subcuticular, and Steri-Strips were applied.     Estimated blood loss: Minimal  Pathology: none  The patient was taken to the recovery room where he was without difficulty or apparent complication.

## 2025-05-01 NOTE — ANESTHESIA POSTPROCEDURE EVALUATION
Patient: Valeriano Rendon    Procedure: Procedure(s):  HERNIORRHAPHY, BILATERAL INGUINAL, ROBOT-ASSISTED  HERNIORRHAPHY, UMBILICAL, OPEN       Anesthesia Type:  General    Note:  Disposition: Outpatient   Postop Pain Control: Uneventful            Sign Out: Well controlled pain   PONV: No   Neuro/Psych: Uneventful            Sign Out: Acceptable/Baseline neuro status   Airway/Respiratory: Uneventful            Sign Out: Acceptable/Baseline resp. status   CV/Hemodynamics: Uneventful            Sign Out: Acceptable CV status; No obvious hypovolemia; No obvious fluid overload   Other NRE: NONE   DID A NON-ROUTINE EVENT OCCUR? No       Last vitals:  Vitals Value Taken Time   /69 05/01/25 1030   Temp 37.9  C (100.22  F) 05/01/25 1035   Pulse 61 05/01/25 1035   Resp 12 05/01/25 1035   SpO2 96 % 05/01/25 1032   Vitals shown include unfiled device data.    Electronically Signed By: Leah Garrett MD  May 1, 2025  11:32 AM

## 2025-05-05 ENCOUNTER — PATIENT OUTREACH (OUTPATIENT)
Dept: SURGERY | Facility: CLINIC | Age: 55
End: 2025-05-05
Payer: COMMERCIAL

## 2025-05-05 NOTE — PROGRESS NOTES
05/05/25    9:59 AM     Valeriano Rendon is a patient of Dr. Jerry Ruth that underwent laparoscopic bilateral inguinal hernia repair and open ventral/umbilical hernia repair approximately 4 days ago (5/1).  Attempted to contact patient via telephone for a status update and review post-op  teaching.  LM on VM to call office.  Await return call.      Of note:  Wound:  Steri-strips  Follow-up:  Routine  Restrictions:  - No strenuous exercise for 3-4 weeks  - No lifting, pushing, pulling more than 15-20 pounds for 3-4 weeks  New medications:  Norco  Equipment/Supplies:  Athletic Supporter  Diet:  Regular

## 2025-05-05 NOTE — PROGRESS NOTES
RN Post-Op/Post-Discharge Care Coordination Note    Spoke with Patient.    Support  Patient able to care for self independently     Health Status  Nausea/Vomiting: Patient denies nausea/vomiting.  Eating/drinking: Patient is able to eat and drink without any complaints.  Diet:  Regular  Bowel habits: Patient reports having a normal bowel movement.  Drains (ROSLYN): N/A  Fevers/chills: Patient denies any fever or chills.  Incisions: Patient denies any signs and symptoms of infection..  Wound closure:  Steri-strips  Pain: Minimal and is medicating with OTC analgesics PRN per package instructions    Activity/Restrictions  No lifting in excess of 15-20 pounds for 3-4 weeks    Equipment  Athletic Supporter    Pathology reviewed with patient:  N/A    Forms/Letters  No    All of his questions were answered including reviewing restrictions and wound care.  She will call this office if he has any further questions and/or concerns.      In lieu of a post-op clinic patient that patient would like to be contacted in approximately 7-10 days via telephone.    A copy of this note was routed to the primary surgeon.      Whom and When to Call  Patient acknowledges understanding of how to manage any medication changes and   when to seek medical care.     Patient advised that if after hour medical concerns arise to please call [phone] and choose option 4 to speak to the physician on call.

## 2025-05-13 ENCOUNTER — PATIENT OUTREACH (OUTPATIENT)
Dept: SURGERY | Facility: CLINIC | Age: 55
End: 2025-05-13
Payer: COMMERCIAL

## 2025-05-13 NOTE — PROGRESS NOTES
Valeriano Rendon was contacted several days post procedure via telephone for a status update and elected to have a telephone follow -up call approximately 7-10 days after original contact in lieu of a clinic visit with Dr. Jerry Ruth.  He continues to do well and the steri-strips  have peeled off.  The patients wounds are healed and the area is C/D/I.  He is afebrile, pain free, and timo po; the patient is slowly resuming his normal activity.   Discussed restrictions including no lifting in excess of 15-20 pounds for 3 weeks.    All of Valeriano Rendon question's were answered and  she would like to return to the clinic on a PRN basis.  The patient is aware that  she may schedule a post op appointment at any time.    A copy of this note was routed to the patients surgeon.

## 2025-06-04 ENCOUNTER — OFFICE VISIT (OUTPATIENT)
Dept: ORTHOPEDICS | Facility: CLINIC | Age: 55
End: 2025-06-04
Attending: FAMILY MEDICINE
Payer: COMMERCIAL

## 2025-06-04 DIAGNOSIS — G89.29 CHRONIC LEFT SHOULDER PAIN: ICD-10-CM

## 2025-06-04 DIAGNOSIS — M54.12 LEFT CERVICAL RADICULOPATHY: Primary | ICD-10-CM

## 2025-06-04 DIAGNOSIS — M25.512 CHRONIC LEFT SHOULDER PAIN: ICD-10-CM

## 2025-06-04 PROCEDURE — 99204 OFFICE O/P NEW MOD 45 MIN: CPT | Performed by: FAMILY MEDICINE

## 2025-06-04 SDOH — HEALTH STABILITY: PHYSICAL HEALTH: ON AVERAGE, HOW MANY DAYS PER WEEK DO YOU ENGAGE IN MODERATE TO STRENUOUS EXERCISE (LIKE A BRISK WALK)?: 7 DAYS

## 2025-06-04 NOTE — LETTER
6/4/2025      Valeriano Rendon  [address]  [facility] [address]      Dear Colleague,    Thank you for referring your patient, Valeriano Rendon, to the [facility] [place]. Please see a copy of my visit note below.    CHIEF COMPLAINT:  No chief complaint on file.       HISTORY OF PRESENT ILLNESS  Mr. Rendon is a pleasant 54 year old year old male who presents to clinic today with left shoulder pain.  Valeriano explains that he has pain present in the trapezius muscle into his neck. He references limited ROM of cervical region and dull achy pain.     Notes left sided shoulder pain in addition to cervical region pain.  Pain in shoulder isolates to trapezius area and posteriorly to scapular area. He has dealt with this for years and has seen PT for this as well.  He was seen by Minh Cueva on 1/15/25.    Onset: gradual  Location: left shoulder, trapezius  Quality:  aching and dull  Duration: 2.5 years   Severity: 3/10 at worst  Timing: intermittent episodes with lateral cranial flexion and rotation  Modifying factors:  resting and non-use makes it better, movement and use makes it worse  Associated signs & symptoms: pain, tenderness, and limited ROM  Previous similar pain: Yes, chronic issue  Treatments to date: Massage gun, Ice    Additional workup with PCP has included rheumatolgic labs including lyme, carmen, RF, Uric acid negative 12/16/24    Additional history: as documented    Review of Systems:  A 10-point review of systems was obtained and is negative except for as noted in the HPI.       MEDICAL HISTORY  Patient Active Problem List   Diagnosis     CARDIOVASCULAR SCREENING; LDL GOAL LESS THAN 130     Encounter for tobacco use cessation counseling     Family history of melanoma     Chronic right-sided low back pain without sciatica     Strain of neck muscle, subsequent encounter     Myalgia, multiple sites     Stiffness of joints of both hands     Abnormal CT lung  screening     Bilateral inguinal hernia without obstruction or gangrene, recurrence not specified     Umbilical hernia without obstruction and without gangrene       Current Outpatient Medications   Medication Sig Dispense Refill     acetaminophen (TYLENOL) 500 MG tablet Take 500-1,000 mg by mouth every 6 hours as needed for mild pain.       atorvastatin (LIPITOR) 20 MG tablet Take 1 tablet (20 mg) by mouth daily. 90 tablet 3     HYDROcodone-acetaminophen (NORCO) 5-325 MG tablet Take 1-2 tablets by mouth every 4 hours as needed for moderate to severe pain. 10 tablet 0       Allergies   Allergen Reactions     Morphine And Codeine Nausea and Vomiting     Vicodin [Hydrocodone-Acetaminophen] GI Disturbance       Family History   Problem Relation Age of Onset     Melanoma Other         maternal aunt and uncle     Skin Cancer No family hx of        Additional medical/Social/Surgical histories reviewed in [facility] and updated as appropriate.       PHYSICAL EXAM  There were no vitals taken for this visit.    General  - normal appearance, in no obvious distress  HEENT  - conjunctivae not injected, moist mucous membranes  CV  - normal radial pulse  Pulm  - normal respiratory pattern, non-labored  Musculoskeletal - cervical spine  - inspection: normal bone and joint alignment, no obvious kyphosis  - palpation: Left cervical paraspinal tenderness.  - ROM: Limited rotation and side bending of cervical region.  - strength: upper extremities 5/5 in all planes  - special tests:  (-) Spurling bilaterally  Musculoskeletal - Right shoulder  - inspection: normal bone and joint alignment, no obvious deformity, no scapular winging, no AC step-off  - palpation: Slight tenderness left infraspinatus region of shoulder posterolaterally. No tenderness of AC, greater tuberosity, or other shoulder regions.  - ROM:  180 deg flexion   45 deg extension   150 deg abduction   90 deg ER   70 deg IR  - strength: 5/5  strength, 5/5 in all shoulder  planes  - special tests:  (-) Speed's  (-) Neer  (-) Hawkin's  (-) Vamshi  (-) [place]  Neuro  - no sensory or motor deficit, grossly normal coordination, normal muscle tone  Skin  - no ecchymosis, erythema, warmth, or induration, no obvious rash  Psych  - interactive, appropriate, normal mood and affect    IMAGING : XR shoulder left 3 views. Final results and radiologist's interpretation, available in the [facility] health record. Images were reviewed with the patient/family members in the office today. My personal interpretation of the performed imaging is no acute osseous abnormality or significant degenerative changes of joint.       ASSESSMENT & PLAN  Mr. Rendon is a 54 year old year old male with history of lumbar degenerative disc disease who presents to clinic today with chronic cervical and left shoulder pain over the past 2.5 years.    Blayne and I discussed consideration for returning to PT.  We also discussed possibility of injections to improve cervical pain radiating into left shoulder region.   I suspect cervical origin as primary pain generator.  Less likely infraspinatus contribution.     Diagnosis:   Cervical radiculitis rating left upper extremity.  Pain of left shoulder, chronic    Blayne notes that he would be interested in an MRI of the cervical region based on chronicity which I believe to be reasonable.  Discussed concern for nerve impingement referring pain to shoulder.  Discussed if he is eligible for epidural steroid injection, he would be interested in considering this.  If cervical MRI does not show contributory pathology, consider trigger point injections.   Additional use of a muscle relaxant relaxant discussed over the next 2 weeks which was prescribed, tizanidine.  We can also reevaluate strategy for PT based on MRI.    Follow up in person after MRI    It was a pleasure seeing Valeriano today.    Cam Fieror DO, SUYAPA    Primary Care Sports Medicine  Department of Orthopedic  Surgery  [facility]    Again, thank you for allowing me to participate in the care of your patient.        Sincerely,        Cam Fierro, DO    Electronically signed

## 2025-06-04 NOTE — PROGRESS NOTES
CHIEF COMPLAINT:  No chief complaint on file.       HISTORY OF PRESENT ILLNESS  Mr. Rendon is a pleasant 54 year old year old male who presents to clinic today with left shoulder pain.  Valeriano explains that he has pain present in the trapezius muscle into his neck. He references limited ROM of cervical region and dull achy pain.     Notes left sided shoulder pain in addition to cervical region pain.  Pain in shoulder isolates to trapezius area and posteriorly to scapular area. He has dealt with this for years and has seen PT for this as well.  He was seen by Minh Cueva on 1/15/25.    Onset: gradual  Location: left shoulder, trapezius  Quality:  aching and dull  Duration: 2.5 years   Severity: 3/10 at worst  Timing: intermittent episodes with lateral cranial flexion and rotation  Modifying factors:  resting and non-use makes it better, movement and use makes it worse  Associated signs & symptoms: pain, tenderness, and limited ROM  Previous similar pain: Yes, chronic issue  Treatments to date: Massage gun, Ice    Additional workup with PCP has included rheumatolgic labs including lyme, carmen, RF, Uric acid negative 12/16/24    Additional history: as documented    Review of Systems:  A 10-point review of systems was obtained and is negative except for as noted in the HPI.       MEDICAL HISTORY  Patient Active Problem List   Diagnosis    CARDIOVASCULAR SCREENING; LDL GOAL LESS THAN 130    Encounter for tobacco use cessation counseling    Family history of melanoma    Chronic right-sided low back pain without sciatica    Strain of neck muscle, subsequent encounter    Myalgia, multiple sites    Stiffness of joints of both hands    Abnormal CT lung screening    Bilateral inguinal hernia without obstruction or gangrene, recurrence not specified    Umbilical hernia without obstruction and without gangrene       Current Outpatient Medications   Medication Sig Dispense Refill    acetaminophen (TYLENOL) 500 MG tablet Take  500-1,000 mg by mouth every 6 hours as needed for mild pain.      atorvastatin (LIPITOR) 20 MG tablet Take 1 tablet (20 mg) by mouth daily. 90 tablet 3    HYDROcodone-acetaminophen (NORCO) 5-325 MG tablet Take 1-2 tablets by mouth every 4 hours as needed for moderate to severe pain. 10 tablet 0       Allergies   Allergen Reactions    Morphine And Codeine Nausea and Vomiting    Vicodin [Hydrocodone-Acetaminophen] GI Disturbance       Family History   Problem Relation Age of Onset    Melanoma Other         maternal aunt and uncle    Skin Cancer No family hx of        Additional medical/Social/Surgical histories reviewed in [facility] and updated as appropriate.       PHYSICAL EXAM  There were no vitals taken for this visit.    General  - normal appearance, in no obvious distress  HEENT  - conjunctivae not injected, moist mucous membranes  CV  - normal radial pulse  Pulm  - normal respiratory pattern, non-labored  Musculoskeletal - cervical spine  - inspection: normal bone and joint alignment, no obvious kyphosis  - palpation: Left cervical paraspinal tenderness.  - ROM: Limited rotation and side bending of cervical region.  - strength: upper extremities 5/5 in all planes  - special tests:  (-) Spurling bilaterally  Musculoskeletal - Right shoulder  - inspection: normal bone and joint alignment, no obvious deformity, no scapular winging, no AC step-off  - palpation: Slight tenderness left infraspinatus region of shoulder posterolaterally. No tenderness of AC, greater tuberosity, or other shoulder regions.  - ROM:  180 deg flexion   45 deg extension   150 deg abduction   90 deg ER   70 deg IR  - strength: 5/5  strength, 5/5 in all shoulder planes  - special tests:  (-) Speed's  (-) Neer  (-) Hawkin's  (-) Vamshi  (-) [place]'s  Neuro  - no sensory or motor deficit, grossly normal coordination, normal muscle tone  Skin  - no ecchymosis, erythema, warmth, or induration, no obvious rash  Psych  - interactive, appropriate,  normal mood and affect    IMAGING : XR shoulder left 3 views. Final results and radiologist's interpretation, available in the [facility] health record. Images were reviewed with the patient/family members in the office today. My personal interpretation of the performed imaging is no acute osseous abnormality or significant degenerative changes of joint.       ASSESSMENT & PLAN  Mr. Rendon is a 54 year old year old male with history of lumbar degenerative disc disease who presents to clinic today with chronic cervical and left shoulder pain over the past 2.5 years.    Blayne and I discussed consideration for returning to PT.  We also discussed possibility of injections to improve cervical pain radiating into left shoulder region.   I suspect cervical origin as primary pain generator.  Less likely infraspinatus contribution.     Diagnosis:   Cervical radiculitis rating left upper extremity.  Pain of left shoulder, chronic    Blayne notes that he would be interested in an MRI of the cervical region based on chronicity which I believe to be reasonable.  Discussed concern for nerve impingement referring pain to shoulder.  Discussed if he is eligible for epidural steroid injection, he would be interested in considering this.  If cervical MRI does not show contributory pathology, consider trigger point injections.   Additional use of a muscle relaxant relaxant discussed over the next 2 weeks which was prescribed, tizanidine.  We can also reevaluate strategy for PT based on MRI.    Follow up in person after MRI    It was a pleasure seeing Valeriano today.    Cam Fierro DO, SUYAPA    Primary Care Sports Medicine  Department of Orthopedic Surgery  [facility]

## 2025-07-01 ENCOUNTER — OFFICE VISIT (OUTPATIENT)
Dept: ORTHOPEDICS | Facility: CLINIC | Age: 55
End: 2025-07-01
Payer: COMMERCIAL

## 2025-07-01 ENCOUNTER — ANCILLARY PROCEDURE (OUTPATIENT)
Dept: GENERAL RADIOLOGY | Facility: CLINIC | Age: 55
End: 2025-07-01
Attending: ORTHOPAEDIC SURGERY
Payer: COMMERCIAL

## 2025-07-01 VITALS — HEIGHT: 66 IN | BODY MASS INDEX: 25.07 KG/M2 | WEIGHT: 156 LBS

## 2025-07-01 DIAGNOSIS — M47.22 CERVICAL SPONDYLOSIS WITH RADICULOPATHY: ICD-10-CM

## 2025-07-01 DIAGNOSIS — M48.02 FORAMINAL STENOSIS OF CERVICAL REGION: ICD-10-CM

## 2025-07-01 DIAGNOSIS — M54.12 CERVICAL RADICULOPATHY: ICD-10-CM

## 2025-07-01 DIAGNOSIS — M47.22 CERVICAL RADICULOPATHY DUE TO DEGENERATIVE JOINT DISEASE OF SPINE: ICD-10-CM

## 2025-07-01 DIAGNOSIS — M54.12 CERVICAL RADICULOPATHY: Primary | ICD-10-CM

## 2025-07-01 PROCEDURE — 99204 OFFICE O/P NEW MOD 45 MIN: CPT | Mod: GC | Performed by: ORTHOPAEDIC SURGERY

## 2025-07-01 PROCEDURE — 72050 X-RAY EXAM NECK SPINE 4/5VWS: CPT | Performed by: RADIOLOGY

## 2025-07-01 NOTE — NURSING NOTE
"Reason For Visit:   Chief Complaint   Patient presents with    Consult     Cervical pain referral from Dr. Fierro        Primary MD: Cosme Parham  Ref. MD: Dr. Fierro    ?  No  Occupation. Unemployed  Currently working? No.  Work status?  Not working.  Date of injury: June 2022  Type of injury: Fall.  Date of surgery: 5/1/2025  Type of surgery: HERNIORRHAPHY, BILATERAL INGUINAL, ROBOT-ASSISTED   Smoker: No  Request smoking cessation information: No    Ht 1.669 m (5' 5.7\")   Wt 70.8 kg (156 lb)   BMI 25.41 kg/m      Pain Assessment  Patient Currently in Pain: Yes  Primary Pain Location: Neck    Oswestry (JOCELYNE) Questionnaire        3/13/2018     2:00 PM   OSWESTRY DISABILITY INDEX   Count 10    Sum 16    Oswestry Score (%) 32 %        Data saved with a previous flowsheet row definition            Neck Disability Index (NDI) Questionnaire        12/18/2024     8:33 AM   Neck Disability Index (NDI)   Neck Disability Index: Count 10   NDI: Total Score = SUM (points for all 10 findings) 23   Neck Disability in Percent = (Total Score) / 50 * 100 46 (%)              Visual Analog Pain Scale  Back Pain Scale 0-10: 0  Right leg pain: 0  Left leg pain: 0  Neck Pain Scale 0-10: 4  Right arm pain: 4  Left arm pain: 4    Promis 10 Assessment         No data to display                         Asael Edwards CMA    "

## 2025-07-01 NOTE — LETTER
7/1/2025      Valeriano Rendon  [address]  [facility] [address]      Dear Colleague,    Thank you for referring your patient, Valeriano Rendon, to the [facility] [place]. Please see a copy of my visit note below.    Spine Surgery Consultation    REFERRING PHYSICIAN: Cam Fierro   PRIMARY CARE PHYSICIAN: Cosme Parham           Chief Complaint:   Consult (Cervical pain referral from Dr. Fierro )      History of Present Illness:  Symptom Profile Including: location of symptoms, onset, severity, exacerbating/alleviating factors, previous treatments:        Valeriano Rendon is a 55 year old male who presents with chronic left-sided neck pain.  Patient states this has been ongoing for approximately 4 years, however just obtained insurance and presents to discuss treatment options.  Localizes pain over the trapezius muscle and is worse with turning his head to the left side.  He cannot drive some days because the pain is so bad and he can't turn his head to see the mirrors.  He has been out of work as a result of these symptoms..  He does notes bilateral subjective weakness and left-sided numbness.  He has completed physical therapy the past few days.  He did trial physical therapy for his neck pain however this seemed to exacerbate his symptoms.  He is currently treating his pain with ibuprofen which he states is insignificant.  Reports no problems with walking or balance.      Recently underwent right hernia repair with general surgery last month.         Past Medical History:   No past medical history on file.         Past Surgical History:     Past Surgical History:   Procedure Laterality Date     COLONOSCOPY N/A 2/12/2025    Procedure: COLONOSCOPY, WITH POLYPECTOMY AND BIOPSY;  Surgeon: Girish Henry DO;  Location: [facility] OR     COLONOSCOPY WITH CO2 INSUFFLATION N/A 2/12/2025    Procedure: Colonoscopy with CO2 insufflation;  Surgeon: Carl  "DO Girish;  Location: [facility] OR     DAVINCI HERNIORRHAPHY INGUINAL Bilateral 5/1/2025    Procedure: HERNIORRHAPHY, BILATERAL INGUINAL, ROBOT-ASSISTED;  Surgeon: Jerry Ruth MD;  Location: [facility] OR     HERNIORRHAPHY UMBILICAL N/A 5/1/2025    Procedure: HERNIORRHAPHY, UMBILICAL, OPEN;  Surgeon: Jerry Ruth MD;  Location: [facility] OR            Social History:     Social History     Tobacco Use     Smoking status: Every Day     Types: Vaping Device     Smokeless tobacco: Former     Tobacco comments:     Quiet cigarettes 4 years ago- uses nicotine vapes   Substance Use Topics     Alcohol use: No     Alcohol/week: 0.0 standard drinks of alcohol            Family History:     Family History   Problem Relation Age of Onset     Melanoma Other         maternal aunt and uncle     Skin Cancer No family hx of             Allergies:     Allergies   Allergen Reactions     Morphine And Codeine Nausea and Vomiting     Vicodin [Hydrocodone-Acetaminophen] GI Disturbance            Medications:     Current Outpatient Medications   Medication Sig Dispense Refill     acetaminophen (TYLENOL) 500 MG tablet Take 500-1,000 mg by mouth every 6 hours as needed for mild pain.       atorvastatin (LIPITOR) 20 MG tablet Take 1 tablet (20 mg) by mouth daily. 90 tablet 3     HYDROcodone-acetaminophen (NORCO) 5-325 MG tablet Take 1-2 tablets by mouth every 4 hours as needed for moderate to severe pain. 10 tablet 0     tiZANidine (ZANAFLEX) 4 MG tablet Take 1 tablet (4 mg) by mouth nightly as needed for muscle spasms. 20 tablet 0     No current facility-administered medications for this visit.             Review of Systems:     A 10 point ROS was performed and reviewed. Specific responses to these questions are noted at the end of the document.         Physical Exam:   Vitals: Ht 1.669 m (5' 5.7\")   Wt 70.8 kg (156 lb)   BMI 25.41 kg/m    Constitutional: awake, alert, cooperative, no apparent distress, appears stated age.    Eyes: The " sclera are white.  Ears, Nose, Throat: The trachea is midline.  Psychiatric: The patient has a normal affect.  Respiratory: breathing non-labored  Cardiovascular: The extremities are warm and perfused.  Skin: no obvious rashes or lesions.  Musculoskeletal, Neurologic, and Spine:   Cervical spine:    Appearance -no gross step-offs, kyphosis.    Motor -     C5: Deltoids R 5/5 and L 5/5 strength    C6: Biceps R 5/5 and L 5/5 strength     C7: Triceps R 5/5 and L 5/5 strength     C8:  R 5/5 and L 5/5 strength     T1: Dorsal interossei R 4/5 and L 4/5 strength        Sensation: intact to light touch in C5-T1      Special Tests -      Lhermitte's Test - Negative     Spurling's Test - Positive to the left side     Gonsalez's Test - Negative       Lumbar Spine:    Appearance - No gross stepoffs or deformities    Motor -     L2-3: Hip flexion R 5/5  And L 5/5 strength          L3/4:  Knee extension R 5/5 and L 5/5 strength         L4/5:  Foot dorsiflexion R 5/5 L 5/5 and       EHL dorsiflexion R 4/5 L 4/5 strength         S1:  Plantarflexion/Peroneal Muscles  R 5/5 and L 5/5 strength    Sensation: intact to light touch L3-S1 distribution BLE        Neurologic:      REFLEXES Right Left   Biceps 2+ 2+   Triceps 2+ 2+   Brachioradialis 2+ 2+   Patella 2+ 2+   Ankle jerk 2+ 2+   Babinski No upgoing great toe No upgoing great toe   Clonus 0 beats 0 beats     Alignment:  Patient stands with a neutral standing sagittal balance.           Imaging:   We ordered and independently reviewed new radiographs at this clinic visit. The results were discussed with the patient.  Findings include:    MRI of the cervical spine obtained on 6/12/2025 was reviewed and showed moderate spondylolisthesis at C2-C4 levels, severe left worse than right foraminal stenosis C5-6 and C6-7    Radiographs show mild sopndylosis C5-7 in the disc space                     Assessment and Plan:   Assessment:  The patient is a 55-year-old male presenting with  chronic cervical spine symptoms. He has previously trialed physical therapy, which exacerbated his pain, and has failed other conserv ative management with oral medications and activity modification.  He is disabled by the symptoms given that he cannot work.   Given the chronic nature of his symptoms we discussed that epidural steroid injections are unlikely to provide meaningful or durable relief.      We reviewed definitive surgical options, including anterior cervical discectomy and fusion  versus cervical disc replacement. We discussed the relative advantages and risks of each: ACDF offers predictable fusion and long-term durability but results in loss of motion at the involved level and potential for adjacent segment degeneration. Cervical disc replacement, by contrast, preserves motion at the affected segment and may reduce stress on adjacent levels, though carries its own risks, including heterotopic ossification, implant failure, and the need for future revision.    After reviewing imaging and discussing all options in detail, the patient elected to proceed with cervical disc replacement. This is a reasonable choice given his clinical presentation, absence of significant facet arthropathy, and preserved motion segment on prior imaging.    Risks of this surgery include risk of infection, risk of dural tear resulting in CSF leak which might result in headaches, or possible need for lumbar drain, or possible revision surgery in the setting of a persistent leak. Possible nerve root injury resulting in numbness weakness or paralysis into the arms or legs. Possible radiculitis which could result in similar symptoms or could result in significant neurogenic type pain. Risk of incomplete decompression which might require revision surgery in the future.  Risk of adjacent segment problems requiring surgery in the future. Risk of incomplete relief of symptoms possibly requiring revision surgery in the future.  Furthermore, although rare, there are risks of major vessel injury such as to the vertebral artery or major organ injury such as to the esophagus or trachea from the surgery.  There are risks of dysphagia or dysphonia which can make it hard to swallow or talk. I explained that in fact most patients will have some period of swallowing difficulty following the surgery.  In some cases these things occur as a result of laryngeal nerve injury, and we discussed this possibility as well. There is a risk of hematoma formation requiring urgent return to the OR for airway compromise.  There is a risk of blood clots in the legs or the lungs.  Lastly, although rare, there are certainly risks of the anesthetic including stroke heart attack and death.    For anterior cervical disc replacement we use medtronic prestige LP devices.      No further conservative care is indicated, and the surgery is medically necessary for the following reasons:    There is no indication for further physical therapy in this case.  Further physical therapy would delay necessary medical treatment and prolong the patient's suffering with no benefit and the delay would increase the risk of neurologic decline and/or symptom worsening unnecessarily, with no benefit to the patient and possible harm.       The patient has already trialed oral medications as listed in the medication history, and has trialed activity modification such as decreasing their bending and twisting, and decreasing their walking distance.  In spite of this, and in spite of the conservative therapies documented above, the patient has significant functional disability in their activities of daily living such as prolonged sitting and standing, prolonged walking, and daily chores, each of which are very difficult or painful because of this spinal problem.  Therefore the proposed surgery is medically necessary and the patient has failed conservative care.      There are no untreated,  underlying mental health conditions (including but not limited to psychological conditions or drug or alcohol abuse) that will preclude an appropriate recovery from this surgery or that are contraindications to proceeding with surgery.       Plan:  - Obtain cervical spine x-rays today to assess alignment and dynamic motion  - Order cervical spine CT to evaluate endplate morphology, uncovertebral joint anatomy, and surgical planning  - Refer to anesthesia for preoperative medical clearance  - Proceed with surgical scheduling once imaging and clearance are complete  - Patient understands the risks, benefits, and postoperative expectations, and is in agreement with the plan for disc replacement      Hernandez Lopez MD   Orthopaedic resident, PGY-4      Attending MD (Dr. Jaspreet Conner) : I personally performed greater than 50% of the effort related to this patient visit and am responsible for the medical decision making and billing in this case.  I met with the patient, reviewed and verified the history and physical exam of the patient and discussed the patient's management with the other clinical providers involved in this patient's care including any involved residents or physicians assistants. I also personally reviewed the imaging and I personally formulated the treatment plan and diagnosis in their entirety.  I reviewed the above note and agree with the documented findings and plan of care, which were communicated to the patient.      Jaspreet Conner MD    Respectfully,  Jaspreet Conner MD  Spine Surgery  [facility]      Again, thank you for allowing me to participate in the care of your patient.        Sincerely,        Jaspreet Conner MD    Electronically signed

## 2025-07-01 NOTE — PROGRESS NOTES
Spine Surgery Consultation    REFERRING PHYSICIAN: Cam Fierro   PRIMARY CARE PHYSICIAN: Cosme Parham           Chief Complaint:   Consult (Cervical pain referral from Dr. Fierro )      History of Present Illness:  Symptom Profile Including: location of symptoms, onset, severity, exacerbating/alleviating factors, previous treatments:        Valeriano Rendon is a 55 year old male who presents with chronic left-sided neck pain.  Patient states this has been ongoing for approximately 4 years, however just obtained insurance and presents to discuss treatment options.  Localizes pain over the trapezius muscle and is worse with turning his head to the left side.  He cannot drive some days because the pain is so bad and he can't turn his head to see the mirrors.  He has been out of work as a result of these symptoms..  He does notes bilateral subjective weakness and left-sided numbness.  He has completed physical therapy the past few days.  He did trial physical therapy for his neck pain however this seemed to exacerbate his symptoms.  He is currently treating his pain with ibuprofen which he states is insignificant.  Reports no problems with walking or balance.      Recently underwent right hernia repair with general surgery last month.         Past Medical History:   No past medical history on file.         Past Surgical History:     Past Surgical History:   Procedure Laterality Date    COLONOSCOPY N/A 2/12/2025    Procedure: COLONOSCOPY, WITH POLYPECTOMY AND BIOPSY;  Surgeon: Girish Henry DO;  Location: [facility] OR    COLONOSCOPY WITH CO2 INSUFFLATION N/A 2/12/2025    Procedure: Colonoscopy with CO2 insufflation;  Surgeon: Girish Henry DO;  Location: [facility] OR    DAVINCI HERNIORRHAPHY INGUINAL Bilateral 5/1/2025    Procedure: HERNIORRHAPHY, BILATERAL INGUINAL, ROBOT-ASSISTED;  Surgeon: Jerry Ruth MD;  Location: [facility] OR    HERNIORRHAPHY UMBILICAL N/A 5/1/2025    Procedure: HERNIORRHAPHY,  "UMBILICAL, OPEN;  Surgeon: Jerry Ruth MD;  Location: [facility] OR            Social History:     Social History     Tobacco Use    Smoking status: Every Day     Types: Vaping Device    Smokeless tobacco: Former    Tobacco comments:     Quiet cigarettes 4 years ago- uses nicotine vapes   Substance Use Topics    Alcohol use: No     Alcohol/week: 0.0 standard drinks of alcohol            Family History:     Family History   Problem Relation Age of Onset    Melanoma Other         maternal aunt and uncle    Skin Cancer No family hx of             Allergies:     Allergies   Allergen Reactions    Morphine And Codeine Nausea and Vomiting    Vicodin [Hydrocodone-Acetaminophen] GI Disturbance            Medications:     Current Outpatient Medications   Medication Sig Dispense Refill    acetaminophen (TYLENOL) 500 MG tablet Take 500-1,000 mg by mouth every 6 hours as needed for mild pain.      atorvastatin (LIPITOR) 20 MG tablet Take 1 tablet (20 mg) by mouth daily. 90 tablet 3    HYDROcodone-acetaminophen (NORCO) 5-325 MG tablet Take 1-2 tablets by mouth every 4 hours as needed for moderate to severe pain. 10 tablet 0    tiZANidine (ZANAFLEX) 4 MG tablet Take 1 tablet (4 mg) by mouth nightly as needed for muscle spasms. 20 tablet 0     No current facility-administered medications for this visit.             Review of Systems:     A 10 point ROS was performed and reviewed. Specific responses to these questions are noted at the end of the document.         Physical Exam:   Vitals: Ht 1.669 m (5' 5.7\")   Wt 70.8 kg (156 lb)   BMI 25.41 kg/m    Constitutional: awake, alert, cooperative, no apparent distress, appears stated age.    Eyes: The sclera are white.  Ears, Nose, Throat: The trachea is midline.  Psychiatric: The patient has a normal affect.  Respiratory: breathing non-labored  Cardiovascular: The extremities are warm and perfused.  Skin: no obvious rashes or lesions.  Musculoskeletal, Neurologic, and Spine: "   Cervical spine:    Appearance -no gross step-offs, kyphosis.    Motor -     C5: Deltoids R 5/5 and L 5/5 strength    C6: Biceps R 5/5 and L 5/5 strength     C7: Triceps R 5/5 and L 5/5 strength     C8:  R 5/5 and L 5/5 strength     T1: Dorsal interossei R 4/5 and L 4/5 strength        Sensation: intact to light touch in C5-T1      Special Tests -      Lhermitte's Test - Negative     Spurling's Test - Positive to the left side     Gonsalez's Test - Negative       Lumbar Spine:    Appearance - No gross stepoffs or deformities    Motor -     L2-3: Hip flexion R 5/5  And L 5/5 strength          L3/4:  Knee extension R 5/5 and L 5/5 strength         L4/5:  Foot dorsiflexion R 5/5 L 5/5 and       EHL dorsiflexion R 4/5 L 4/5 strength         S1:  Plantarflexion/Peroneal Muscles  R 5/5 and L 5/5 strength    Sensation: intact to light touch L3-S1 distribution BLE        Neurologic:      REFLEXES Right Left   Biceps 2+ 2+   Triceps 2+ 2+   Brachioradialis 2+ 2+   Patella 2+ 2+   Ankle jerk 2+ 2+   Babinski No upgoing great toe No upgoing great toe   Clonus 0 beats 0 beats     Alignment:  Patient stands with a neutral standing sagittal balance.           Imaging:   We ordered and independently reviewed new radiographs at this clinic visit. The results were discussed with the patient.  Findings include:    MRI of the cervical spine obtained on 6/12/2025 was reviewed and showed moderate spondylolisthesis at C2-C4 levels, severe left worse than right foraminal stenosis C5-6 and C6-7    Radiographs show mild sopndylosis C5-7 in the disc space                     Assessment and Plan:   Assessment:  The patient is a 55-year-old male presenting with chronic cervical spine symptoms. He has previously trialed physical therapy, which exacerbated his pain, and has failed other conserv ative management with oral medications and activity modification.  He is disabled by the symptoms given that he cannot work.   Given the chronic  nature of his symptoms we discussed that epidural steroid injections are unlikely to provide meaningful or durable relief.      We reviewed definitive surgical options, including anterior cervical discectomy and fusion  versus cervical disc replacement. We discussed the relative advantages and risks of each: ACDF offers predictable fusion and long-term durability but results in loss of motion at the involved level and potential for adjacent segment degeneration. Cervical disc replacement, by contrast, preserves motion at the affected segment and may reduce stress on adjacent levels, though carries its own risks, including heterotopic ossification, implant failure, and the need for future revision.    After reviewing imaging and discussing all options in detail, the patient elected to proceed with cervical disc replacement. This is a reasonable choice given his clinical presentation, absence of significant facet arthropathy, and preserved motion segment on prior imaging.    Risks of this surgery include risk of infection, risk of dural tear resulting in CSF leak which might result in headaches, or possible need for lumbar drain, or possible revision surgery in the setting of a persistent leak. Possible nerve root injury resulting in numbness weakness or paralysis into the arms or legs. Possible radiculitis which could result in similar symptoms or could result in significant neurogenic type pain. Risk of incomplete decompression which might require revision surgery in the future.  Risk of adjacent segment problems requiring surgery in the future. Risk of incomplete relief of symptoms possibly requiring revision surgery in the future. Furthermore, although rare, there are risks of major vessel injury such as to the vertebral artery or major organ injury such as to the esophagus or trachea from the surgery.  There are risks of dysphagia or dysphonia which can make it hard to swallow or talk. I explained that in fact  most patients will have some period of swallowing difficulty following the surgery.  In some cases these things occur as a result of laryngeal nerve injury, and we discussed this possibility as well. There is a risk of hematoma formation requiring urgent return to the OR for airway compromise.  There is a risk of blood clots in the legs or the lungs.  Lastly, although rare, there are certainly risks of the anesthetic including stroke heart attack and death.    For anterior cervical disc replacement we use medtronic prestige LP devices.      No further conservative care is indicated, and the surgery is medically necessary for the following reasons:    There is no indication for further physical therapy in this case.  Further physical therapy would delay necessary medical treatment and prolong the patient's suffering with no benefit and the delay would increase the risk of neurologic decline and/or symptom worsening unnecessarily, with no benefit to the patient and possible harm.       The patient has already trialed oral medications as listed in the medication history, and has trialed activity modification such as decreasing their bending and twisting, and decreasing their walking distance.  In spite of this, and in spite of the conservative therapies documented above, the patient has significant functional disability in their activities of daily living such as prolonged sitting and standing, prolonged walking, and daily chores, each of which are very difficult or painful because of this spinal problem.  Therefore the proposed surgery is medically necessary and the patient has failed conservative care.      There are no untreated, underlying mental health conditions (including but not limited to psychological conditions or drug or alcohol abuse) that will preclude an appropriate recovery from this surgery or that are contraindications to proceeding with surgery.       Plan:  - Obtain cervical spine x-rays today to  assess alignment and dynamic motion  - Order cervical spine CT to evaluate endplate morphology, uncovertebral joint anatomy, and surgical planning  - Refer to anesthesia for preoperative medical clearance  - Proceed with surgical scheduling once imaging and clearance are complete  - Patient understands the risks, benefits, and postoperative expectations, and is in agreement with the plan for disc replacement      Hernandez Lopez MD   Orthopaedic resident, PGY-4      Attending MD (Dr. Jaspreet Conner) : I personally performed greater than 50% of the effort related to this patient visit and am responsible for the medical decision making and billing in this case.  I met with the patient, reviewed and verified the history and physical exam of the patient and discussed the patient's management with the other clinical providers involved in this patient's care including any involved residents or physicians assistants. I also personally reviewed the imaging and I personally formulated the treatment plan and diagnosis in their entirety.  I reviewed the above note and agree with the documented findings and plan of care, which were communicated to the patient.      Jaspreet Conner MD    Respectfully,  Jaspreet Conner MD  Spine Surgery  [facility]

## 2025-07-02 ENCOUNTER — TELEPHONE (OUTPATIENT)
Dept: NEUROSURGERY | Facility: CLINIC | Age: 55
End: 2025-07-02
Payer: COMMERCIAL

## 2025-07-02 NOTE — TELEPHONE ENCOUNTER
FUTURE VISIT INFORMATION      SURGERY INFORMATION:  Date: 7/25/25  Location: [facility]  Surgeon:  Jaspreet Conner MD   Anesthesia Type:  General   Procedure: Cervical 5 to 7 anterior cervical decompression and disc replacement with Niteroige LP device, fluoroscopy and microscope   Consult: 7/1/25     RECORDS REQUESTED FROM:       Primary Care Provider: Cosme Parham MD    Pertinent Medical History: Allergies: Morphine And Codeine, Vicodin [Hydrocodone-acetaminophen]    Nicotine vape use

## 2025-07-02 NOTE — TELEPHONE ENCOUNTER
Outgoing call to schedule surgery with Dr. Conner    Spoke with: Blayne (patient)    Reason for Surgical Date: Next Available    Date of Surgery: 7/25/25    Estimated Arrival time Discussed with Patient:  No    Location of surgery: [facility]     Pre-Op H&P: Scheduled with PAC 7/9/25 at 8:00am    Imaging: Yes CT scan, scheduled at [place] on 7/9/25 at 7:00am    Additional Pre-Op Appointments: No     Post-Op Appt Date w/ NP/PA:  N/A      Post-Op Appt Date w/ Surgeon  9/9/25 at 8:50am     Additional Post-Op Appointments: No     Discussed with patient PAC RN will provide arrival time and instructions for surgery at the time of the appointment: [[place] locations only]: Yes    Standard Surgery Packet Sent: Yes 07/02/25  via Received in clinic by clinic staff       Additional Comments: N/A      Marion Doss MA on 7/2/2025 at 11:38 AM

## 2025-07-03 NOTE — PROGRESS NOTES
Teaching Flowsheet     Visit Type: In Clinic    Time Start: .  Time End: .  Total Time Spent: 15 min.    Surgeon: Ernesto  [place] [facility] [place] of Anesthesia: General  Worker's Compensation Procedure: No    Pertinent Medical History: .  Were medical conditions reviewed and appropriate for location? Yes  BMI: Overweight BMI 25-29.9    Relevant Diagnosis: spine  Teaching Topic: preop    Person(s) involved in teaching:   Patient & SO  : No.   Verified Patient's Phone Number: YES: .    Caregiver//  Name: NA ADMIT  Phone Number: .   Relationship: .  Consent to Communicate on file: Yes  Authorization to Share Protected Health Information- Person to person communication signed by patient and authorized the following person or people:      Motivation Level:  Asks Questions: Yes  Eager to Learn: Yes  Cooperative: Yes  Receptive (willing/able to accept information): Yes  Any cultural factors/Taoism beliefs that may influence understanding or compliance? No     Patient and Caregiver demonstrates understanding of the following:  Reason for the appointment, diagnosis and treatment plan: Yes  Knowledge of proper use of medications and conditions for which they are ordered (with special attention to potential side effects or drug interactions): Yes  Which situations necessitate calling provider and whom to contact: Yes     Teaching Concerns Addressed:   Proper use and care of medical equip, care aids, etc.: Yes  Nutritional needs and diet plan: Yes  Pain management techniques: Yes  Wound Care: Yes  How and/when to access community resources: Yes  Need for pre-op with in 30 days: YES, to be done with PAC.      Does patient have difficulty getting a ride to appointments (post-ops, PT/OT): No  Patient's plan after discharge: home with family or spouse     Instructional Materials Used/Given:  two bottles of chlorhexidine soap and a surgery packet given to patient  in clinic. Instructed patient to buy or get two 8 ounces bottles of antiseptic surgical soap called 4% CHG. Common name brand of this soap are Hibiclens and Exidine. I told them they can find this at their local pharmacy, clinic or retail store. If they have trouble finding it, I told them to ask their pharmacist to help them find a substitute.   - Important Contact Info/Phone Numbers: emphasizing clinic number [phone] and after hours number [phone]  - Map/location of surgery and follow-up appointments  - Showering instructions  - Stoplight Tool     -Next step: schedule a surgery date.    Claudette Spence RN

## 2025-07-08 LAB
ABO + RH BLD: NORMAL
BLD GP AB SCN SERPL QL: NEGATIVE
SPECIMEN EXP DATE BLD: NORMAL

## 2025-07-09 ENCOUNTER — PRE VISIT (OUTPATIENT)
Dept: SURGERY | Facility: CLINIC | Age: 55
End: 2025-07-09

## 2025-07-09 ENCOUNTER — LAB (OUTPATIENT)
Dept: LAB | Facility: CLINIC | Age: 55
End: 2025-07-09
Payer: COMMERCIAL

## 2025-07-09 ENCOUNTER — OFFICE VISIT (OUTPATIENT)
Dept: SURGERY | Facility: CLINIC | Age: 55
End: 2025-07-09
Payer: COMMERCIAL

## 2025-07-09 ENCOUNTER — HOSPITAL ENCOUNTER (OUTPATIENT)
Dept: CT IMAGING | Facility: CLINIC | Age: 55
Discharge: HOME | End: 2025-07-09
Attending: ORTHOPAEDIC SURGERY
Payer: COMMERCIAL

## 2025-07-09 VITALS
OXYGEN SATURATION: 98 % | HEART RATE: 75 BPM | BODY MASS INDEX: 24.94 KG/M2 | TEMPERATURE: 97.9 F | DIASTOLIC BLOOD PRESSURE: 70 MMHG | RESPIRATION RATE: 16 BRPM | HEIGHT: 66 IN | SYSTOLIC BLOOD PRESSURE: 113 MMHG | WEIGHT: 155.2 LBS

## 2025-07-09 DIAGNOSIS — Z01.818 PREOP EXAMINATION: Primary | ICD-10-CM

## 2025-07-09 DIAGNOSIS — M47.22 CERVICAL SPONDYLOSIS WITH RADICULOPATHY: ICD-10-CM

## 2025-07-09 DIAGNOSIS — M54.12 CERVICAL RADICULOPATHY: ICD-10-CM

## 2025-07-09 DIAGNOSIS — R73.03 PREDIABETES: ICD-10-CM

## 2025-07-09 DIAGNOSIS — M48.02 FORAMINAL STENOSIS OF CERVICAL REGION: ICD-10-CM

## 2025-07-09 DIAGNOSIS — Z01.818 PREOP EXAMINATION: ICD-10-CM

## 2025-07-09 DIAGNOSIS — M47.22 CERVICAL RADICULOPATHY DUE TO DEGENERATIVE JOINT DISEASE OF SPINE: ICD-10-CM

## 2025-07-09 LAB
ERYTHROCYTE [DISTWIDTH] IN BLOOD BY AUTOMATED COUNT: 11.7 % (ref 10–15)
EST. AVERAGE GLUCOSE BLD GHB EST-MCNC: 117 MG/DL
FERRITIN SERPL-MCNC: 140 NG/ML (ref 31–409)
HBA1C MFR BLD: 5.7 %
HCT VFR BLD AUTO: 38.6 % (ref 40–53)
HGB BLD-MCNC: 12.9 G/DL (ref 13.3–17.7)
HOLD SPECIMEN: NORMAL
IRON BINDING CAPACITY (ROCHE): 289 UG/DL (ref 240–430)
IRON SATN MFR SERPL: 25 % (ref 15–46)
IRON SERPL-MCNC: 72 UG/DL (ref 61–157)
MCH RBC QN AUTO: 31.8 PG (ref 26.5–33)
MCHC RBC AUTO-ENTMCNC: 33.4 G/DL (ref 31.5–36.5)
MCV RBC AUTO: 95 FL (ref 78–100)
PLATELET # BLD AUTO: 211 10E3/UL (ref 150–450)
RBC # BLD AUTO: 4.06 10E6/UL (ref 4.4–5.9)
WBC # BLD AUTO: 6.8 10E3/UL (ref 4–11)

## 2025-07-09 PROCEDURE — 85027 COMPLETE CBC AUTOMATED: CPT | Performed by: PATHOLOGY

## 2025-07-09 PROCEDURE — 82728 ASSAY OF FERRITIN: CPT | Performed by: PATHOLOGY

## 2025-07-09 PROCEDURE — 99000 SPECIMEN HANDLING OFFICE-LAB: CPT | Performed by: PATHOLOGY

## 2025-07-09 PROCEDURE — 36415 COLL VENOUS BLD VENIPUNCTURE: CPT | Performed by: PATHOLOGY

## 2025-07-09 PROCEDURE — 83540 ASSAY OF IRON: CPT | Performed by: PATHOLOGY

## 2025-07-09 PROCEDURE — 72125 CT NECK SPINE W/O DYE: CPT

## 2025-07-09 PROCEDURE — 86900 BLOOD TYPING SEROLOGIC ABO: CPT

## 2025-07-09 PROCEDURE — 83550 IRON BINDING TEST: CPT | Performed by: PATHOLOGY

## 2025-07-09 PROCEDURE — 99204 OFFICE O/P NEW MOD 45 MIN: CPT | Mod: 24

## 2025-07-09 PROCEDURE — 83036 HEMOGLOBIN GLYCOSYLATED A1C: CPT

## 2025-07-09 RX ORDER — IBUPROFEN 200 MG
400 TABLET ORAL EVERY 4 HOURS PRN
COMMUNITY

## 2025-07-09 ASSESSMENT — ENCOUNTER SYMPTOMS
SEIZURES: 0
ORTHOPNEA: 0

## 2025-07-09 ASSESSMENT — PAIN SCALES - GENERAL: PAINLEVEL_OUTOF10: MILD PAIN (3)

## 2025-07-09 ASSESSMENT — LIFESTYLE VARIABLES: TOBACCO_USE: 1

## 2025-07-09 NOTE — H&P
Pre-Operative H & P     CC:  Preoperative exam to assess for increased cardiopulmonary risk while undergoing surgery and anesthesia.    Date of Encounter: 7/9/2025  Primary Care Physician:  Cosme Parham     Reason for visit:   Encounter Diagnoses   Name Primary?    Preop examination Yes    Cervical radiculopathy due to degenerative joint disease of spine     Cervical radiculopathy     Cervical spondylosis with radiculopathy     Foraminal stenosis of cervical region     Prediabetes        HPI  Valeriano Rendon is a 55 year old male who presents for pre-operative H & P in preparation for  Procedure Information       Case: [id number] Date/Time: 07/25/25 1025    Procedure: Cervical 5 to 7 anterior cervical decompression and disc replacement with medtronic prestige LP device, fluoroscopy and microscope (Spine)    Anesthesia type: General    Diagnosis:       Cervical radiculopathy due to degenerative joint disease of spine [M47.22]      Cervical radiculopathy [M54.12]      Cervical spondylosis with radiculopathy [M47.22]      Foraminal stenosis of cervical region [M48.02]    Pre-op diagnosis:       Cervical radiculopathy due to degenerative joint disease of spine [M47.22]      Cervical radiculopathy [M54.12]      Cervical spondylosis with radiculopathy [M47.22]      Foraminal stenosis of cervical region [M48.02]    [facility]    Providers: Jaspreet Conner MD            Valeriano Rendon is a 55-year-old male with a past medical history significant for tobacco use, hyperlipidemia, and prediabetes who has chronic left-sided neck pain ongoing for the past 4 years. He did trial physical therapy for his neck pain however this seemed to exacerbate his symptoms. MRI of the cervical spine obtained on 6/12/2025 was reviewed and showed moderate spondylolisthesis at C2-C4 levels, severe left worse than right foraminal stenosis C5-6 and C6-7. He was subsequently referred to Dr. Conner on  7/1/2025 and the above surgery was recommended for further management.     History is obtained from the patient and chart review    Hx of abnormal bleeding or anti-platelet use: Denies.      Past Medical History  Past Medical History:   Diagnosis Date    Prediabetes        Past Surgical History  Past Surgical History:   Procedure Laterality Date    COLONOSCOPY N/A 2/12/2025    Procedure: COLONOSCOPY, WITH POLYPECTOMY AND BIOPSY;  Surgeon: Girish Henry DO;  Location: [facility]    COLONOSCOPY WITH CO2 INSUFFLATION N/A 2/12/2025    Procedure: Colonoscopy with CO2 insufflation;  Surgeon: Girish Henry DO;  Location: [facility]    DAVINCI HERNIORRHAPHY INGUINAL Bilateral 5/1/2025    Procedure: HERNIORRHAPHY, BILATERAL INGUINAL, ROBOT-ASSISTED;  Surgeon: Jerry Ruth MD;  Location: [facility] OR    HERNIORRHAPHY UMBILICAL N/A 5/1/2025    Procedure: HERNIORRHAPHY, UMBILICAL, OPEN;  Surgeon: Jerry Ruth MD;  Location: [facility] OR       Prior to Admission Medications  Current Outpatient Medications   Medication Sig Dispense Refill    atorvastatin (LIPITOR) 20 MG tablet Take 1 tablet (20 mg) by mouth daily. (Patient taking differently: Take 20 mg by mouth every morning.) 90 tablet 3    ibuprofen (ADVIL/MOTRIN) 200 MG tablet Take 400 mg by mouth every 4 hours as needed for pain.         Allergies  Allergies   Allergen Reactions    Morphine And Codeine Nausea and Vomiting    Vicodin [Hydrocodone-Acetaminophen] GI Disturbance       Social History  Social History     Socioeconomic History    Marital status: Single     Spouse name: Not on file    Number of children: Not on file    Years of education: Not on file    Highest education level: Not on file   Occupational History    Not on file   Tobacco Use    Smoking status: Every Day     Types: Vaping Device    Smokeless tobacco: Former    Tobacco comments:     Quiet cigarettes 4 years ago- uses nicotine vapes   Vaping Use    Vaping status: Every Day    Substances:  Nicotine   Substance and Sexual Activity    Alcohol use: No     Alcohol/week: 0.0 standard drinks of alcohol    Drug use: Yes     Comment: marijuana    Sexual activity: Yes   Other Topics Concern    Parent/sibling w/ CABG, MI or angioplasty before 65F 55M? No   Social History Narrative    Not on file     Social Drivers of Health     Financial Resource Strain: Low Risk  (1/8/2025)    Financial Resource Strain     Within the past 12 months, have you or your family members you live with been unable to get utilities (heat, electricity) when it was really needed?: No   Food Insecurity: Low Risk  (1/8/2025)    Food Insecurity     Within the past 12 months, did you worry that your food would run out before you got money to buy more?: No     Within the past 12 months, did the food you bought just not last and you didn t have money to get more?: No   Transportation Needs: Low Risk  (1/8/2025)    Transportation Needs     Within the past 12 months, has lack of transportation kept you from medical appointments, getting your medicines, non-medical meetings or appointments, work, or from getting things that you need?: No   Physical Activity: Sufficiently Active (6/4/2025)    Exercise Vital Sign     Days of Exercise per Week: 7 days     Minutes of Exercise per Session: 60 min   Stress: Stress Concern Present (1/8/2025)    [employer] - Occupational Stress Questionnaire     Feeling of Stress : Rather much   Social Connections: Unknown (1/8/2025)    Social Connection and Isolation Panel [NHANES]     Frequency of Communication with Friends and Family: Not on file     Frequency of Social Gatherings with Friends and Family: Once a week     Attends Tenriism Services: Not on file     Active Member of Clubs or Organizations: Not on file     Attends Club or Organization Meetings: Not on file     Marital Status: Not on file   Interpersonal Safety: Low Risk  (5/1/2025)    Interpersonal Safety     Do you feel  physically and emotionally safe where you currently live?: Yes     Within the past 12 months, have you been hit, slapped, kicked or otherwise physically hurt by someone?: No     Within the past 12 months, have you been humiliated or emotionally abused in other ways by your partner or ex-partner?: No   Housing Stability: Low Risk  (1/8/2025)    Housing Stability     Do you have housing? : Yes     Are you worried about losing your housing?: No       Family History  Family History   Problem Relation Age of Onset    Melanoma Other         maternal aunt and uncle    Skin Cancer No family hx of     Anesthesia Reaction No family hx of     Venous thrombosis No family hx of        Review of Systems  The complete review of systems is negative other than noted in the HPI or here.   Anesthesia Evaluation   Pt has had prior anesthetic. Type: General and MAC.    No history of anesthetic complications       ROS/MED HX  ENT/Pulmonary:     (+)                tobacco use (vaping nicotine. Quit cigarettes in 2021. 35 pack year history.), Current use,                    (-) asthma, sleep apnea and recent URI   Neurologic:     (+)    peripheral neuropathy, - hands (L>R).                        (-) no seizures and no CVA   Cardiovascular:     (+) Dyslipidemia - -   -  - -                                 No previous cardiac testing  (-) ZARAGOZA and orthopnea/PND   METS/Exercise Tolerance: >4 METS Comment: Walking 3-4 miles continuously daily without any CP, chest tightness, or SOB. Currently doing home projects including drywall and building a deck without any exertional symptoms.    Hematologic:  - neg hematologic  ROS  (-) history of blood clots and history of blood transfusion   Musculoskeletal: Comment: - Chronic neck pain 2/2 spondylolisthesis at C2-C4 levels and foraminal stenosis C5-6 and C6-7  - Chronic low back pain      GI/Hepatic: Comment: - s/p bilateral inguinal hernia and umbilical hernia repair on 5/1/25   (-) GERD  "  Renal/Genitourinary:  - neg Renal ROS  (-) renal disease   Endo: Comment: - Prediabetes. Last A1C 12/16/24 was 5.8.    (-) chronic steroid usage   Psychiatric/Substance Use:     (+)     Recreational drug usage: Cannabis.    Infectious Disease:  - neg infectious disease ROS  (-) Recent Fever   Malignancy:   (+) Malignancy (BCC on face s/p resection), History of Skin.Skin CA Remission status post Surgery.      Other:      (+)  , H/O Chronic Pain,         /70 (BP Location: Right arm, Patient Position: Sitting, Cuff Size: Adult Regular)   Pulse 75   Temp 97.9  F (36.6  C) (Oral)   Resp 16   Ht 1.669 m (5' 5.7\")   Wt 70.4 kg (155 lb 3.2 oz)   SpO2 98%   BMI 25.28 kg/m      Physical Exam   Constitutional: Awake, alert, cooperative, no apparent distress, and appears stated age.  Eyes: Pupils equal, round and reactive to light, extra ocular muscles intact, sclera clear, conjunctiva normal.  HENT: Normocephalic, oral pharynx with moist mucus membranes. No goiter appreciated.   Respiratory: Clear to auscultation bilaterally, no crackles or wheezing.  Cardiovascular: Regular rate and rhythm, normal S1 and S2, and no murmur noted.  Carotids +2, no bruits. No edema. Palpable pulses to radial arteries.   GI: Normal bowel sounds, soft, non-distended, non-tender, no masses palpated, no hepatosplenomegaly.    Lymph/Hematologic: No cervical lymphadenopathy and no supraclavicular lymphadenopathy.  Genitourinary:  Deferred.   Skin: Warm and dry.    Musculoskeletal: Limited neck extension. There is no redness, warmth, or swelling of the joints. Gross motor strength is normal.    Neurologic: Awake, alert, oriented to name, place and time. Cranial nerves II-XII are grossly intact. Gait is normal.   Neuropsychiatric: Calm, cooperative. Normal affect.     Prior Labs/Diagnostic Studies   All labs and imaging pertinent to the visit personally reviewed     Chest CT 4/24/25:  Impression:   1. Stable size and appearance of 10 mm " subpleural pulmonary nodule in  the right lower lobe, previously category 4A. Given stability, this is  a Lung-RADS category 3 nodule. Recommend six-month low-dose CT  follow-up.  2. Few other scattered pulmonary nodules and small granulomas are  unchanged.    XR Cervical Spine 7/1/25:  Impression:  1. Multilevel anterolisthesis on lateral and flexion views, resolving  with extension.  2. Multilevel cervical degenerative changes, greatest at C5-6 and  C6-7.    MR Cervical 6/12/25:  IMPRESSION:  1.  At C5-C6 there is mild to moderate spinal canal stenosis and moderate to severe bilateral neural foraminal stenosis.  2.  At C6-C7 there is severe left and moderate right neural foraminal stenosis.  3.  Moderate left neural foraminal stenosis at C2-C3 and C3-C4.  4.  Modic type I endplate changes on the left at C2-C3.  5.  Additional multilevel cervical spondylosis, as detailed above.  6.  Mild prominence of the central cord canal from C5-C6 to C6-C7, may represent a thin syrinx.          EKG/ stress test - if available please see in ROS above     The patient's records and results pertinent to the visit personally reviewed by this provider.     Outside records reviewed from: Care Everywhere    LAB/DIAGNOSTIC STUDIES TODAY:  T&S, CBC with reflex to iron studies, A1C    Component      Latest Ref Rng 7/9/2025  9:07 AM   WBC      4.0 - 11.0 10e3/uL 6.8    RBC Count      4.40 - 5.90 10e6/uL 4.06 (L)    Hemoglobin      13.3 - 17.7 g/dL 12.9 (L)    Hematocrit      40.0 - 53.0 % 38.6 (L)    MCV      78 - 100 fL 95    MCH      26.5 - 33.0 pg 31.8    MCHC      31.5 - 36.5 g/dL 33.4    RDW      10.0 - 15.0 % 11.7    Platelet Count      150 - 450 10e3/uL 211    Iron      61 - 157 ug/dL 72    Iron Binding Capacity      240 - 430 ug/dL 289    Iron Sat Index      15 - 46 % 25    Estimated Average Glucose      <117 mg/dL 117 (H)    Hemoglobin A1C      <5.7 % 5.7 (H)    Hold Specimen JIC    Ferritin      31 - 409 ng/mL 140        Legend:  (L) Low  (H) High    Assessment    Valeriano Rendon is a 55 year old male seen as a PAC referral for risk assessment and optimization for anesthesia.    Plan/Recommendations  Pt will be optimized for the proposed procedure.  See below for details on the assessment, risk, and preoperative recommendations    NEUROLOGY  - No history of TIA, CVA or seizure    -Post Op delirium risk factors:  No risk identified    ENT  - No current airway concerns.  Will need to be reassessed day of surgery.  Mallampati: I  TM: > 3    -Last ETT 5/1/25:  ETT Placement Date: 05/01/25; Placement Time: 0839 (created via procedure documentation); Mask Ventilation: 1; Induction Type: Intravenous; Ease of Intubation: Easy; Technique: Direct laryngoscopy; Tube Size: 7.5 mm; DL Blade Size: Hardy 2; Grade View: 1; Adjucts: Stylet; Placement Person: CRNA; Attempts: 1       CARDIAC  - No history of CAD, Hypertension, and Afib. Patient is able to achieve > 4 METS and denies any symptoms of chest pain, chest tightness, or SOB.   - Hyperlipidemia managed on atorvastatin - continue uninterrupted leading up to DOS.    - METS (Metabolic Equivalents)  Patient performs 4 or more METS exercise without symptoms             Total Score: 0      RCRI-Very low risk: Class 1 0.4% complication rate             Total Score: 0        PULMONARY  SAMI Low Risk             Total Score: 2    SAMI: Over 50 ys old    SAMI: Male      - Denies asthma or inhaler use  - Currently vaping nicotine. Quit cigarettes in 2021. 35 pack year history.   - Tobacco History    History   Smoking Status    Every Day    Types: Vaping Device   Smokeless Tobacco    Former       GI  - Denies GERD.   PONV Low Risk  Total Score: 1           1 AN PONV: Intended Post Op Opioids      - s/p bilateral inguinal hernia and umbilical hernia repair on 5/1/25.     /RENAL  - Baseline Creatinine  0.94    ENDOCRINE    - BMI: Estimated body mass index is 25.28 kg/m  as calculated from the  "following:    Height as of this encounter: 1.669 m (5' 5.7\").    Weight as of this encounter: 70.4 kg (155 lb 3.2 oz).  Overweight (BMI 25.0-29.9)  - Prediabetes. Last A1C 12/16/24 was 5.8. Updating today.     HEME  VTE Low Risk 0.5%             Total Score: 2    VTE: Male      - No history of abnormal bleeding or antiplatelet use.    A type and screen has been ordered for this patient    -Hgb today is 12.9. Iron infusions not indicated per reflex iron studies.     MSK  Patient is NOT Frail             Total Score: 0      - Chronic neck pain 2/2 spondylolisthesis at C2-C4 levels and foraminal stenosis C5-6 and C6-7 - surgery planned as above.   - Chronic low back pain. Consider careful positioning to help minimize patient discomfort.     PSYCH/SUBSTANCE  - Smoking marijuana - hold x 24 hours prior to surgery.     ONC  - H/o BCC of skin on face s/p resection.    Different anesthesia methods/types have been discussed with the patient, but they are aware that the final plan will be decided by the assigned anesthesia provider on the date of service.      The patient is optimized for their procedure. AVS with information on surgery time/arrival time, meds and NPO status given by nursing staff. No further diagnostic testing indicated.        45 minutes were spent on the date of the encounter performing chart review, history and exam, documentation and/or discussion with other providers about the issues documented above.    RAYMOND Louise CNP  Preoperative Assessment Center  [facility]  Clinic and Surgery Center  Phone: [phone]  Fax: [phone]    "

## 2025-07-09 NOTE — PATIENT INSTRUCTIONS
Preparing for Your Surgery      Name:  Valeriano Rendon   MRN:  [id number]   :  1970   Today's Date:  2025     The [place] [employer] [address] Construction Project                                Timeline 2025 -2025    This project will affect travel to the [facility] and [facility], as well as the [facility].      Please check the [facility] [address] project website for the most up to date information and give yourself additional time to reach your destination.        Arriving for surgery:  Surgery date:  2025  Arrival time:  8:00 am    Please come to:       [facility] [address]   [address] [place]  [address]     The Green Ramp for patients and visitors is beneath the [facility]. The parking facility entrance is at the intersection of [address] [place] [address] and [address]. Patients and visitors who self-park will receive the reduced hospital parking rate (no ticket validation needed).     [employer] parking, located at the [facility] main entrance on [address] [place] [address], is available Monday - Friday from 7 am to 3:30 pm.     Discounted parking pass options can be purchased from  attendants during business hours.     -Check in at the security desk in the [facility] ([facility])   Lobby. They will direct you to the correct elevators.   -Proceed to the 3rd floor, Adult Surgery Waiting Lounge. [phone]     If you need directions, a wheelchair or escort please stop at the Information Desk in the lobby.  Inform the information person you are here for surgery; a wheelchair and escort to [address] will be provided.   An escort to the Adult Surgery Waiting Lounge will be provided. .    What can I eat or drink?  -  You may eat and drink normally up to 8 hours prior to arrival time. (Until 12:00 am)  -   You may have clear liquids until 2 hours prior to arrival time. (Until 6:00 am)    Examples of clear liquids:  Water  Clear broth  Juices (apple, white grape, white cranberry  and cider) without pulp  Noncarbonated, powder based beverages  (lemonade and Ford-Aid)  Sodas (Sprite, 7-Up, ginger ale and seltzer)  Coffee or tea (without milk or cream)  Gatorade       **No Alcohol or cannabis products for at least 24 hours before surgery.     Which medicines can I take?    Hold Aspirin for 7 days before surgery.   Hold Multivitamins for 7 days before surgery.  Hold Supplements for 7 days before surgery.  **Hold Ibuprofen (Advil, Motrin) for 3 day(s) before surgery--unless otherwise directed by surgeon.   Hold Naproxen (Aleve) for 4 days before surgery.      -  PLEASE TAKE these medications the day of surgery:    Atorvastatin (Lipitor)         How do I prepare myself?  - Please take 2 showers (one the night prior to surgery and one the morning of surgery) using Scrubcare or Hibiclens soap.    Use this soap only from the neck to your toes. Avoid genital area      Leave the soap on your skin for one minute--then rinse thoroughly.      You may use your own shampoo and conditioner. No other hair products.   - Please remove all jewelry and body piercings.  - No lotions, deodorants or fragrance.  - No makeup or fingernail polish.   - Bring your ID and insurance card.    -For patients being admitted to the [facility] [place] [place]  Family members are to take the patient belongings with them and place them in the lockers provided in the Family Lounge.  Please limit the items you bring to 1 bag as the lockers are small.      -If you use a CPAP machine, please bring the CPAP machine, tubing, and mask to hospital.    -If you have a Deep Brain Stimulator, Spinal Cord Stimulator, or any Neuro Stimulator device---you must bring the remote control to the hospital.      ALL PATIENTS GOING HOME THE SAME DAY OF SURGERY ARE REQUIRED TO HAVE A  RESPONSIBLE ADULT TO DRIVE AND BE IN ATTENDANCE WITH THEM FOR 24 HOURS FOLLOWING SURGERY.    Covid testing policy as of 12/06/2022  Your surgeon will notify and schedule you for a COVID test if one is needed before surgery--please direct any questions or COVID symptoms to your surgeon      Questions or Concerns:    - For any questions regarding the day of surgery or your hospital stay, please contact the Pre Admission Nursing Office at [phone].       - If you have health changes between today and your surgery, please call your surgeon.       - For questions after surgery, please call your surgeons office.           Current Visitor Guidelines    2 adult visitors for adult patients in the pre op area    If additional visitors come (beyond a patient care attendant or a group home caregiver), the additional visitors will be asked to wait in the main lobby of the hospital    Visiting hours: 8 a.m. to 8:30 p.m.    Patients confirmed or suspected to have symptoms of COVID 19 or flu:     No visitors allowed for adult patients.   Children (under age 18) can have 1 named visitor.     People who are sick or showing symptoms of COVID 19 or flu:    Are not allowed to visit patients--we can only make exceptions in special situations.       Please follow these guidelines for your visit:          Please maintain social distance          Masking is optional--however at times you may be asked to wear a mask for the safety of yourself and others     Clean your hands with alcohol hand . Do this when you arrive at and leave the building and patient room,    And again after you touch your mask or anything in the room.     Go directly to and from the room you are visiting.     Stay in the patient s room during your visit. Limit going to other places in the hospital as much as possible     Leave bags and jackets at home or in the car.     For everyone s health, please don t come and go during your visit. That includes for  smoking   during your visit.

## 2025-07-15 ENCOUNTER — TELEPHONE (OUTPATIENT)
Dept: NEUROSURGERY | Facility: CLINIC | Age: 55
End: 2025-07-15
Payer: COMMERCIAL

## 2025-07-15 NOTE — TELEPHONE ENCOUNTER
RN called patient to discuss post op plan. Patient stating that he lives in a split level home and was hoping that he didn't have to stay in the hospital for 5 stairs. RN discussed that we will keep patient overnight for monitoring but he is okay to climb 5 stairs to his main floor of his house.     Nimisha Garcia RN

## 2025-07-15 NOTE — TELEPHONE ENCOUNTER
Other: Patient called and stated they he has a guest room he can stay in on the ground floor of his house. Patient is hoping for a same day surgery option. Patients girlfriend is retired RN. Please call patient back.     Could we send this information to you in [employer] or would you prefer to receive a phone call?:   Patient would prefer a phone call   Okay to leave a detailed message?: Yes at Cell number on file:    Telephone Information:   Mobile [phone]

## 2025-07-25 ENCOUNTER — HOSPITAL ENCOUNTER (OUTPATIENT)
Facility: CLINIC | Age: 55
Discharge: HOME | End: 2025-07-25
Attending: ORTHOPAEDIC SURGERY | Admitting: ORTHOPAEDIC SURGERY
Payer: COMMERCIAL

## 2025-07-25 ENCOUNTER — APPOINTMENT (OUTPATIENT)
Dept: GENERAL RADIOLOGY | Facility: CLINIC | Age: 55
End: 2025-07-25
Attending: ORTHOPAEDIC SURGERY
Payer: COMMERCIAL

## 2025-07-25 VITALS
WEIGHT: 156.53 LBS | BODY MASS INDEX: 25.5 KG/M2 | SYSTOLIC BLOOD PRESSURE: 120 MMHG | OXYGEN SATURATION: 99 % | RESPIRATION RATE: 16 BRPM | HEART RATE: 83 BPM | TEMPERATURE: 97.7 F | DIASTOLIC BLOOD PRESSURE: 71 MMHG

## 2025-07-25 DIAGNOSIS — Z98.890 S/P CERVICAL DISC REPLACEMENT: Primary | ICD-10-CM

## 2025-07-25 PROCEDURE — 250N000013 HC RX MED GY IP 250 OP 250 PS 637: Performed by: PHYSICIAN ASSISTANT

## 2025-07-25 PROCEDURE — 360N000085 HC SURGERY LEVEL 5 W/ FLUORO, PER MIN: Performed by: ORTHOPAEDIC SURGERY

## 2025-07-25 PROCEDURE — 250N000009 HC RX 250: Performed by: ORTHOPAEDIC SURGERY

## 2025-07-25 PROCEDURE — 710N000012 HC RECOVERY PHASE 2, PER MINUTE: Performed by: ORTHOPAEDIC SURGERY

## 2025-07-25 PROCEDURE — 250N000025 HC SEVOFLURANE, PER MIN: Performed by: ORTHOPAEDIC SURGERY

## 2025-07-25 PROCEDURE — 272N000001 HC OR GENERAL SUPPLY STERILE: Performed by: ORTHOPAEDIC SURGERY

## 2025-07-25 PROCEDURE — 370N000017 HC ANESTHESIA TECHNICAL FEE, PER MIN: Performed by: ORTHOPAEDIC SURGERY

## 2025-07-25 PROCEDURE — 250N000013 HC RX MED GY IP 250 OP 250 PS 637

## 2025-07-25 PROCEDURE — 999N000180 XR SURGERY CARM FLUORO LESS THAN 5 MIN

## 2025-07-25 PROCEDURE — 999N000141 HC STATISTIC PRE-PROCEDURE NURSING ASSESSMENT: Performed by: ORTHOPAEDIC SURGERY

## 2025-07-25 PROCEDURE — C1889 IMPLANT/INSERT DEVICE, NOC: HCPCS | Performed by: ORTHOPAEDIC SURGERY

## 2025-07-25 PROCEDURE — 710N000010 HC RECOVERY PHASE 1, LEVEL 2, PER MIN: Performed by: ORTHOPAEDIC SURGERY

## 2025-07-25 DEVICE — PRESTIGE LP DISC 6X18MM
Type: IMPLANTABLE DEVICE | Site: SPINE CERVICAL | Status: FUNCTIONAL
Brand: PRESTIGE® LP CERVICAL DISC SYSTEM

## 2025-07-25 DEVICE — IMPLANTABLE DEVICE: Type: IMPLANTABLE DEVICE | Site: SPINE CERVICAL | Status: FUNCTIONAL

## 2025-07-25 RX ORDER — OXYCODONE HYDROCHLORIDE 10 MG/1
10 TABLET ORAL
Status: DISCONTINUED | OUTPATIENT
Start: 2025-07-25 | End: 2025-07-25 | Stop reason: HOSPADM

## 2025-07-25 RX ORDER — AMOXICILLIN 250 MG
1 CAPSULE ORAL 2 TIMES DAILY
Status: DISCONTINUED | OUTPATIENT
Start: 2025-07-25 | End: 2025-07-25 | Stop reason: HOSPADM

## 2025-07-25 RX ORDER — BISACODYL 10 MG
10 SUPPOSITORY, RECTAL RECTAL DAILY PRN
Status: DISCONTINUED | OUTPATIENT
Start: 2025-07-25 | End: 2025-07-25 | Stop reason: HOSPADM

## 2025-07-25 RX ORDER — DEXAMETHASONE SODIUM PHOSPHATE 4 MG/ML
4 INJECTION, SOLUTION INTRA-ARTICULAR; INTRALESIONAL; INTRAMUSCULAR; INTRAVENOUS; SOFT TISSUE
Status: DISCONTINUED | OUTPATIENT
Start: 2025-07-25 | End: 2025-07-25 | Stop reason: HOSPADM

## 2025-07-25 RX ORDER — LIDOCAINE HYDROCHLORIDE ANHYDROUS AND DEXTROSE MONOHYDRATE .8; 5 G/100ML; G/100ML
1-1.5 INJECTION, SOLUTION INTRAVENOUS CONTINUOUS
Status: DISCONTINUED | OUTPATIENT
Start: 2025-07-25 | End: 2025-07-25 | Stop reason: HOSPADM

## 2025-07-25 RX ORDER — HYDROMORPHONE HYDROCHLORIDE 1 MG/ML
0.2 INJECTION, SOLUTION INTRAMUSCULAR; INTRAVENOUS; SUBCUTANEOUS
Status: DISCONTINUED | OUTPATIENT
Start: 2025-07-25 | End: 2025-07-25 | Stop reason: HOSPADM

## 2025-07-25 RX ORDER — GABAPENTIN 100 MG/1
300 CAPSULE ORAL
Status: COMPLETED | OUTPATIENT
Start: 2025-07-25 | End: 2025-07-25

## 2025-07-25 RX ORDER — ACETAMINOPHEN 325 MG/1
975 TABLET ORAL EVERY 8 HOURS
Status: DISCONTINUED | OUTPATIENT
Start: 2025-07-25 | End: 2025-07-25 | Stop reason: HOSPADM

## 2025-07-25 RX ORDER — AMOXICILLIN 250 MG
1 CAPSULE ORAL DAILY
Qty: 30 TABLET | Refills: 0 | Status: SHIPPED | OUTPATIENT
Start: 2025-07-25

## 2025-07-25 RX ORDER — ONDANSETRON 2 MG/ML
4 INJECTION INTRAMUSCULAR; INTRAVENOUS EVERY 30 MIN PRN
Status: DISCONTINUED | OUTPATIENT
Start: 2025-07-25 | End: 2025-07-25 | Stop reason: HOSPADM

## 2025-07-25 RX ORDER — ONDANSETRON 4 MG/1
4 TABLET, ORALLY DISINTEGRATING ORAL EVERY 30 MIN PRN
Status: DISCONTINUED | OUTPATIENT
Start: 2025-07-25 | End: 2025-07-25 | Stop reason: HOSPADM

## 2025-07-25 RX ORDER — SODIUM CHLORIDE, SODIUM LACTATE, POTASSIUM CHLORIDE, CALCIUM CHLORIDE 600; 310; 30; 20 MG/100ML; MG/100ML; MG/100ML; MG/100ML
INJECTION, SOLUTION INTRAVENOUS CONTINUOUS
Status: DISCONTINUED | OUTPATIENT
Start: 2025-07-25 | End: 2025-07-25 | Stop reason: HOSPADM

## 2025-07-25 RX ORDER — CEFAZOLIN SODIUM/WATER 2 G/20 ML
2 SYRINGE (ML) INTRAVENOUS
Status: COMPLETED | OUTPATIENT
Start: 2025-07-25 | End: 2025-07-25

## 2025-07-25 RX ORDER — FENTANYL CITRATE 50 UG/ML
25 INJECTION, SOLUTION INTRAMUSCULAR; INTRAVENOUS EVERY 5 MIN PRN
Status: DISCONTINUED | OUTPATIENT
Start: 2025-07-25 | End: 2025-07-25 | Stop reason: HOSPADM

## 2025-07-25 RX ORDER — OXYCODONE HYDROCHLORIDE 5 MG/1
5 TABLET ORAL EVERY 4 HOURS PRN
Status: DISCONTINUED | OUTPATIENT
Start: 2025-07-25 | End: 2025-07-25 | Stop reason: HOSPADM

## 2025-07-25 RX ORDER — MAGNESIUM HYDROXIDE 1200 MG/15ML
LIQUID ORAL PRN
Status: DISCONTINUED | OUTPATIENT
Start: 2025-07-25 | End: 2025-07-25 | Stop reason: HOSPADM

## 2025-07-25 RX ORDER — CEFAZOLIN SODIUM/WATER 2 G/20 ML
2 SYRINGE (ML) INTRAVENOUS SEE ADMIN INSTRUCTIONS
Status: DISCONTINUED | OUTPATIENT
Start: 2025-07-25 | End: 2025-07-25 | Stop reason: HOSPADM

## 2025-07-25 RX ORDER — FENTANYL CITRATE 50 UG/ML
50 INJECTION, SOLUTION INTRAMUSCULAR; INTRAVENOUS EVERY 5 MIN PRN
Status: DISCONTINUED | OUTPATIENT
Start: 2025-07-25 | End: 2025-07-25 | Stop reason: HOSPADM

## 2025-07-25 RX ORDER — HYDROXYZINE HYDROCHLORIDE 25 MG/1
25 TABLET, FILM COATED ORAL EVERY 6 HOURS PRN
Status: DISCONTINUED | OUTPATIENT
Start: 2025-07-25 | End: 2025-07-25 | Stop reason: HOSPADM

## 2025-07-25 RX ORDER — NALOXONE HYDROCHLORIDE 0.4 MG/ML
0.1 INJECTION, SOLUTION INTRAMUSCULAR; INTRAVENOUS; SUBCUTANEOUS
Status: DISCONTINUED | OUTPATIENT
Start: 2025-07-25 | End: 2025-07-25 | Stop reason: HOSPADM

## 2025-07-25 RX ORDER — ACETAMINOPHEN 325 MG/1
650 TABLET ORAL EVERY 4 HOURS PRN
Qty: 50 TABLET | Refills: 0 | Status: SHIPPED | OUTPATIENT
Start: 2025-07-25

## 2025-07-25 RX ORDER — HYDROMORPHONE HYDROCHLORIDE 1 MG/ML
0.2 INJECTION, SOLUTION INTRAMUSCULAR; INTRAVENOUS; SUBCUTANEOUS EVERY 5 MIN PRN
Status: DISCONTINUED | OUTPATIENT
Start: 2025-07-25 | End: 2025-07-25 | Stop reason: HOSPADM

## 2025-07-25 RX ORDER — ACETAMINOPHEN 325 MG/1
975 TABLET ORAL ONCE
Status: COMPLETED | OUTPATIENT
Start: 2025-07-25 | End: 2025-07-25

## 2025-07-25 RX ORDER — LIDOCAINE 40 MG/G
CREAM TOPICAL
Status: DISCONTINUED | OUTPATIENT
Start: 2025-07-25 | End: 2025-07-25 | Stop reason: HOSPADM

## 2025-07-25 RX ORDER — OXYCODONE HYDROCHLORIDE 5 MG/1
5-10 TABLET ORAL EVERY 6 HOURS PRN
Qty: 32 TABLET | Refills: 0 | Status: SHIPPED | OUTPATIENT
Start: 2025-07-25

## 2025-07-25 RX ORDER — MAGNESIUM SULFATE HEPTAHYDRATE 40 MG/ML
2 INJECTION, SOLUTION INTRAVENOUS ONCE
Status: DISCONTINUED | OUTPATIENT
Start: 2025-07-25 | End: 2025-07-25 | Stop reason: HOSPADM

## 2025-07-25 RX ORDER — POLYETHYLENE GLYCOL 3350 17 G/17G
17 POWDER, FOR SOLUTION ORAL DAILY
Status: DISCONTINUED | OUTPATIENT
Start: 2025-07-26 | End: 2025-07-25 | Stop reason: HOSPADM

## 2025-07-25 RX ORDER — OXYCODONE HYDROCHLORIDE 10 MG/1
10 TABLET ORAL EVERY 4 HOURS PRN
Status: DISCONTINUED | OUTPATIENT
Start: 2025-07-25 | End: 2025-07-25 | Stop reason: HOSPADM

## 2025-07-25 RX ORDER — METHOCARBAMOL 750 MG/1
750 TABLET, FILM COATED ORAL EVERY 6 HOURS PRN
Status: DISCONTINUED | OUTPATIENT
Start: 2025-07-25 | End: 2025-07-25 | Stop reason: HOSPADM

## 2025-07-25 RX ORDER — HYDROMORPHONE HYDROCHLORIDE 1 MG/ML
0.4 INJECTION, SOLUTION INTRAMUSCULAR; INTRAVENOUS; SUBCUTANEOUS EVERY 5 MIN PRN
Status: DISCONTINUED | OUTPATIENT
Start: 2025-07-25 | End: 2025-07-25 | Stop reason: HOSPADM

## 2025-07-25 RX ORDER — METHOCARBAMOL 750 MG/1
750 TABLET, FILM COATED ORAL EVERY 6 HOURS PRN
Qty: 60 TABLET | Refills: 0 | Status: SHIPPED | OUTPATIENT
Start: 2025-07-25

## 2025-07-25 RX ORDER — HYDROMORPHONE HYDROCHLORIDE 1 MG/ML
0.4 INJECTION, SOLUTION INTRAMUSCULAR; INTRAVENOUS; SUBCUTANEOUS
Status: DISCONTINUED | OUTPATIENT
Start: 2025-07-25 | End: 2025-07-25 | Stop reason: HOSPADM

## 2025-07-25 RX ORDER — OXYCODONE HYDROCHLORIDE 5 MG/1
5 TABLET ORAL
Status: DISCONTINUED | OUTPATIENT
Start: 2025-07-25 | End: 2025-07-25 | Stop reason: HOSPADM

## 2025-07-25 RX ADMIN — ACETAMINOPHEN 975 MG: 325 TABLET ORAL at 06:23

## 2025-07-25 RX ADMIN — GABAPENTIN 300 MG: 300 CAPSULE ORAL at 06:23

## 2025-07-25 ASSESSMENT — ACTIVITIES OF DAILY LIVING (ADL)
ADLS_ACUITY_SCORE: 16
ADLS_ACUITY_SCORE: 15
ADLS_ACUITY_SCORE: 16
ADLS_ACUITY_SCORE: 16

## 2025-07-25 NOTE — PROGRESS NOTES
"Per significant other Arti \"the doctors told me he would not need a neck brace.\" But instructions will be given on soft collar and skin checks.  "

## 2025-07-25 NOTE — PROGRESS NOTES
Patient to plan to work on dispo home if drain output at 2pm <20cc. If >20cc page orthospine to place admit orders. Nurse to pull drain if < 20cc at 2pm.     Bishop MATI Ramey PA-C

## 2025-07-25 NOTE — OP NOTE
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 Anterior cervical discectomy and disc replacement with medtronic prestige LP device with removal of disc material and osteophytes at C5-6 and C6-7    Primary Surgeon: Jaspreet Conner MD    FIRST ASSISTANT: Timothy Triana, Fellow Surgeon, The assistant was necessary for all phases of the operation, including discectomy, instrumentation, decompression,  placement of the interbody devices, and all bone work and also for visualization, and closure.  There was no qualified resident available.      ANESTHESIA: General Endotracheal    COMPLICATIONS:  None.    SPECIMENS: None.    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  The patient elected surgical treatment, and understood the indications for this surgery, as well as its risks, benefits, and alternatives. We reviewed the risks and benefits of the surgery in detail. The risks include, but are not limited to, the general risks associated with anesthesia, including death, pulmonary embolism, DVT, stroke, myocardial infarction, pneumonia, and urinary tract infection. Additional risks specific to the surgery include the risk of infection, failure to heal (non-union), dural tear with resultant CSF leak which might necessitate placement of a drain or revision surgery or could result in headaches, nerve injury resulting in weakness or paralysis, risk of  adjacent segment disease, the risks of vascular injury resulting in severe or possibly uncontrollable bleeding, need for revision surgery in the future due to one of the above issues, or risk of incomplete symptom relief.  Valeriano Rendon understands the risks of the surgery and wishes to proceed.  No guarantees were given.    DESCRIPTION OF PROCEDURE:           Valeriano Rendon was taken to the operating  room, where the Anesthesiology Service induced satisfactory general anesthesia.  Ancef was given IV.  Venous thromboembolic prophylaxis  was performed with sequential devices placed on the calves bilaterally.  Weeks catheter was placed under standard sterile techniques. A bump was placed under the shoulders the arms were secured.  All pressure points were well padded.  The anterior neck was prepped and draped in its entirety in the usual sterile fashion. We then held a multidisciplinary time out in which we verified the patient, procedure, antibiotics, and operative plan.  All team members were in agreement.    I then performed a standard angulo-madison approach to the anterior spine from the patient's Left side.  I used surface landmarks to identify the incision location.  The skin was sharply incised and I then switched to electrocautery to go through the platysma.  Sub-platysmal dissection was bluntly performed 3cm above and below the incision to relax the soft tissues.   I then used a combination of scissor dissection and blunt dissection to carefully dissect the plane between the SCM and the soft tissues medially, down to the spine. Crossing vessels were tied off as we encountered them.  A spinal needle was placed into the most accessible vertebral body, and a fluoroscopic image was obtained to verify the level.  The surgical disc space was marked with a pen.      I then used electrocautery to elevate the longus coli over the levels of the planned fusion from C5-C7. We then turned our attention to the  first level of decompression.      I used cautery to define the disc C6-7 space.  A 15 blade was then used to incise the annulus caudal and cephalad. A micro-curette was then used to remove the bulk of the disc material.  I next placed caspar pins above and below and the distractor was used to open up the disc space while a arthur was used to gently provide distraction.  With this increased visualization I then removed the remainder of the posterior disc material down to the level of the PLL.  With the PLL still intact, I next used a bur to remove the posterior osteophytes above the level of the PLL and to thin the uncinates bilaterally.  The bur was then used to remove the anterior lip of the cephalad vertebral body and to produce a square shaped perpendicular surface for disc replacement.  I then sized the graft for a planned 6 x18mm Medtronic Prestige LP device.  While this was prepared on the back table, I then used a microcurette to split the PLL and create space between the PLL and dura.  A 2mm kerrison was then used to resect the PLL out to the level of the uncinates and also to remove any remaining posterior osteophytes.  This dissection was carried out into the foramen until the uptake of the root was clearly visible and until a nerve hook could be freely passed out into the foramen bilaterally, thus completing the foraminotomies.  We then used the serial punches and end-plate preparers to prepare the space and then the disc replacement was placed under fluoroscopic guidance.  I was satisfied with the positioning.    I then moved up to the C5-6 level.  Caspars and trimlines were moved up a level.   A 15 blade was then used to incise the annulus caudal and cephalad. A micro-curette was then used to remove the bulk of the disc material.  I next placed caspar pins above and below and the distractor was used to open up the disc space while a arthur was used to gently provide distraction.  With this increased  visualization I then removed the remainder of the posterior disc material down to the level of the PLL.  With the PLL still intact, I next used a bur to remove the posterior osteophytes above the level of the PLL and to thin the uncinates bilaterally.  The bur was then used to remove the anterior lip of the cephalad vertebral body and to produce a square shaped perpendicular surface for disc replacement.  I then sized the graft for a planned 5x16mm Medtronic Prestige LP device.  While this was prepared on the back table, I then used a microcurette to split the PLL and create space between the PLL and dura.  A 2mm kerrison was then used to resect the PLL out to the level of the uncinates and also to remove any remaining posterior osteophytes.  This dissection was carried out into the foramen until the uptake of the root was clearly visible and until a nerve hook could be freely passed out into the foramen bilaterally, thus completing the foraminotomies.  We then used the serial punches and end-plate preparers to prepare the space and then the disc replacement was placed under fluoroscopic guidance.  I was satisfied with the positioning.      We took our final fluoroscopic radiographs and I was satisfied with the positioning.      I then thoroughly irrigated.  A 1/8 inch hemovac drain was placed and the wound was closed in layers with 3-0 vicryl for the platysma, 3-0 vicryl for the dermis, and 4-0 monocryl and dermabond for the skin.  The wound was dressed with 4x4 and tegaderm.      The patient was extubated and taken to the PACU in stable condition.  There were no immediately evident complications.    I, Jaspreet Conner MD, was personally present and scrubbed for the entire procedure.

## 2025-07-25 NOTE — DISCHARGE INSTRUCTIONS
To contact a doctor, call Dr. Maksim Conner: Orthopedic Clinic [phone]  or:     [phone] and ask for the Resident On Call for:          Orthopedics (answered 24 hours a day)   Emergency Departments:  [facility] Adult Emergency Department: [phone]

## 2025-07-25 NOTE — BRIEF OP NOTE
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 Conner   Assistants:    Timothy Triana DO Spine Fellow   Anesthesia:   General  EBL:    25mL   Total IV Fluids:  See Anesthesia Record  Specimens:   None  Findings:   See Operative Dictation  Complications:  None.    Assessment and Plan: Valeriano Rendon is a 55 year old male now s/p C5-7 CDR on 7/25 with Dr. Conner.     Ortho ([facility]) Primary  Activity:   - Up with assist until independent. No excessive bending or twisting. No lifting >10 lbs x 6 weeks.   Weight bearing status: WBAT.  Pain management:   - Transition to PO narcotics as tolerated. No NSAIDs x 3 months.   Antibiotics: Ancef until drain out   Diet: Begin with clear fluids and progress diet as tolerated.   DVT prophylaxis: SCDs only. No chemical DVT ppx needed.  Bracing/Splinting: None.  Dressings: Keep dressings c/d/i x 7 days.  Drains: Document output per shift, will be discontinued at Orthopedic Surgery discretion.  Physical Therapy/Occupational Therapy: Eval and treat.  Cultures: none.    Consults: Hospitalist if remains inpatient.  Follow-up: Clinic with Dr. Conner in 6 weeks with repeat x-rays.   Disposition: Pending progress with therapies, pain control on orals, and medical stability, anticipate discharge to home on POD #0-1.      The procedure was medically necessary for an assistant. My assistance was necessary for patient positioning, prepping and draping, soft tissue retraction, bone graft preparation and placement, and closure. The assistance that I provided reduced operative time which meant less general  anesthetic for the patient.    Timothy Triana,   Spine Fellow       Implants:   Implant Name Type Inv. Item Serial No.  Lot No. LRB No. Used Action   DISC INTVRTB 18MM 6MM PSTG LP [id number] - [id number] Total Joint Component/Insert DISC INTVRTB 18MM 6MM PSTG LP [id number]  [employer] [id number] N/A 1 Implanted   DISC LP PRESTIGE 5X16MM [id number] - [id number] Total Joint Component/Insert DISC LP PRESTIGE 5X16MM [id number]  [employer] [id number] N/A 1 Implanted

## 2025-07-25 NOTE — PROVIDER NOTIFICATION
Dr. Triana approved pt to take NSAIDS and discussed this bedside with pt and pt's significant other.

## 2025-07-25 NOTE — OR NURSING
"PACU to Inpatient Nursing Handoff    Patient Valeriano Rendon is a 55 year old male who speaks English.   Procedure Procedure(s):  Cervical 5 to 7 anterior cervical decompression and disc replacement with medtronic prestige LP device, fluoroscopy and microscope   Surgeon(s) Primary: Jaspreet Conner MD  Assisting: Bishop KETTY Ramey PA-C  Resident - Assisting: Timothy Triana DO     Allergies   Allergen Reactions    Morphine And Codeine Nausea and Vomiting    Vicodin [Hydrocodone-Acetaminophen] GI Disturbance       Isolation  [unfilled]     Past Medical History   has a past medical history of Prediabetes.    Anesthesia General   Dermatome Level     Preop Meds acetaminophen (Tylenol) - time given: 0623  gabapentin (Neurontin) - time given: 0623   Nerve block Not applicable   Intraop Meds dexamethasone (Decadron)  dexmedetomidine (Precedex): 20 mcg total  fentanyl (Sublimaze): 150 mcg total  hydromorphone (Dilaudid): 1 mg total  ketamine (Ketalar): 50 mg given  ketorolac (Toradol): last given at 1008  ondansetron (Zofran): last given at 1005   Local Meds No   Antibiotics cefazolin (Ancef) - last given at 0742     Pain Patient Currently in Pain: no   PACU meds  Not applicable   PCA / epidural No   Capnography     Telemetry ECG Rhythm: Normal sinus rhythm   Inpatient Telemetry Monitor Ordered? No        Labs Glucose Lab Results   Component Value Date     12/16/2024    GLC 91 05/09/2016       Hgb Lab Results   Component Value Date    HGB 12.9 07/09/2025       INR No results found for: \"INR\"   PACU Imaging Not applicable     Wound/Incision Incision/Surgical Site 05/01/25 Abdomen (Active)   Number of days: 85       Incision/Surgical Site 07/25/25 Anterior Neck (Active)   Incision Assessment WDL 07/25/25 1100   Dressing Per Plan of Care 07/25/25 1040   Closure Sutures;Liquid bandage 07/25/25 1005   Incision Drainage Amount None 07/25/25 1100   Dressing Intervention Clean, dry, intact 07/25/25 1100 "   Number of days: 0      CMS        Equipment Not applicable   Other LDA       IV Access Peripheral IV 07/25/25 Right Hand (Active)   Site Assessment WDL 07/25/25 1040   Line Status Infusing 07/25/25 1040   Dressing Transparent 07/25/25 1040   Dressing Status clean;dry;intact 07/25/25 1040   Dressing Intervention New dressing  07/25/25 0700   Line Intervention Flushed 07/25/25 0700   Line Necessity Yes, meets criteria 07/25/25 1040   Phlebitis Scale 0-->no symptoms 07/25/25 1040   Infiltration? no 07/25/25 1040   Number of days: 0       Peripheral IV 07/25/25 Left Hand (Active)   Site Assessment WDL 07/25/25 1040   Line Status Saline locked 07/25/25 1040   Dressing Transparent 07/25/25 1040   Dressing Status clean;dry;intact 07/25/25 1040   Line Intervention Flushed 07/25/25 1040   Line Necessity Yes, meets criteria 07/25/25 1040   Phlebitis Scale 0-->no symptoms 07/25/25 1040   Infiltration? no 07/25/25 1040   Number of days: 0      Blood Products Not applicable EBL 25 mL   Intake/Output Date 07/25/25 0700 - 07/26/25 0659   Shift 2614-9221 5743-4859 4752-1202 24 Hour Total   INTAKE   P.O. 120   120   I.V. 700   700   IV Piggyback 1000   1000   Shift Total(mL/kg) 1820(25.63)   1820(25.63)   OUTPUT   Shift Total(mL/kg)       Weight (kg) 71 71 71 71      Drains / Weeks Drain Closed/Suction 1 Anterior;Left Neck Accordion 10 Yi (Active)   Site Description WD 07/25/25 1100   Dressing Status Normal: Clean, Dry & Intact 07/25/25 1100   Drainage Appearance Serosanguenous 07/25/25 1100   Status To bulb suction 07/25/25 1100   Number of days: 0      Time of void PreOp Time of Void Prior to Procedure: 0600 (07/25/25 0610)    PostOp      Diapered? No   Bladder Scan Bladder Scan Volume (mL): 297 ml (07/25/25 1100)    mL (07/25/25 1100)  tolerating sips     Vitals    B/P: 113/82  T: 97.5  F (36.4  C)    Temp src: Axillary  P:  Pulse: 91 (07/25/25 1100)          R: 13  O2:  SpO2: 98 %    O2 Device: None (Room air)  (07/25/25 1100)              Family/support present significant other   Patient belongings     Patient transported on cart   DC meds/scripts (obs/outpt) Not applicable   Inpatient Pain Meds Released? Yes       Special needs/considerations None   Tasks needing completion None       PITO Bear

## 2025-09-04 ENCOUNTER — TELEPHONE (OUTPATIENT)
Dept: NEUROSURGERY | Facility: CLINIC | Age: 55
End: 2025-09-04
Payer: COMMERCIAL

## 2025-09-04 DIAGNOSIS — M54.50 ACUTE MIDLINE LOW BACK PAIN WITHOUT SCIATICA: Primary | ICD-10-CM

## 2025-09-04 DIAGNOSIS — Z09 POSTOPERATIVE FOLLOW-UP: ICD-10-CM

## 2025-09-04 DIAGNOSIS — M54.12 CERVICAL RADICULOPATHY: Primary | ICD-10-CM

## 2025-09-04 DIAGNOSIS — Z98.890 S/P CERVICAL DISC REPLACEMENT: ICD-10-CM

## 2025-09-04 DIAGNOSIS — M47.22 CERVICAL SPONDYLOSIS WITH RADICULOPATHY: ICD-10-CM

## 2025-09-04 RX ORDER — METHOCARBAMOL 750 MG/1
750 TABLET, FILM COATED ORAL EVERY 6 HOURS PRN
Qty: 60 TABLET | Refills: 0 | Status: SHIPPED | OUTPATIENT
Start: 2025-09-04

## 2025-09-04 RX ORDER — METHYLPREDNISOLONE 4 MG/1
TABLET ORAL
Qty: 21 TABLET | Refills: 0 | Status: SHIPPED | OUTPATIENT
Start: 2025-09-04

## (undated) DEVICE — DRAPE C-ARMOR 5 SIDED 5523

## (undated) DEVICE — GLOVE PROTEXIS BLUE W/NEU-THERA 8.0  2D73EB80

## (undated) DEVICE — DRAPE MICROSCOPE MICRO-KOVER LEICA 48"X120" 09-MK651

## (undated) DEVICE — SOL ISOPROPYL RUBBING ALCOHOL USP 70% 4OZ HDX-20 I0020

## (undated) DEVICE — SURGICEL HEMOSTAT 2X3" 1953

## (undated) DEVICE — Device

## (undated) DEVICE — SU ETHILON 3-0 PS-1 18" 1663H

## (undated) DEVICE — BIT DRILL FLUTELESS 6975150

## (undated) DEVICE — LINEN BACK PACK 5440

## (undated) DEVICE — IOM SUPPLIES/CASE FEE

## (undated) DEVICE — KIT ENDO FIRST STEP DISINFECTANT 200ML W/POUCH EP-4

## (undated) DEVICE — SU DERMABOND ADVANCED .7ML DNX12

## (undated) DEVICE — DRAPE C-ARM W/STRAPS 42X72" 07-CA104

## (undated) DEVICE — SUTURE VICRYL+ 2-0 CT-2 CR 8X18" VCP726D

## (undated) DEVICE — SUCTION MANIFOLD NEPTUNE 2 SYS 4 PORT 0702-020-000

## (undated) DEVICE — IMM COLLAR CERVICAL MED UNIVERSAL 2.5X24" 79-83520

## (undated) DEVICE — PAD CHUX UNDERPAD 23X24" 7136

## (undated) DEVICE — SOLUTION WATER 1000ML BOTTLE R5000-01

## (undated) DEVICE — DRSG GAUZE 4X8" NON21842

## (undated) DEVICE — DRSG TEGADERM 4X4 3/4" 1626W

## (undated) DEVICE — SUTURE MONOCRYL 3-0 18 PS2 UND MCP497G

## (undated) DEVICE — RX SURGIFLO HEMOSTATIC MATRIX W/THROMBIN 8ML 2994

## (undated) DEVICE — POSITIONER HEAD DONUT FOAM 9" HR 104

## (undated) DEVICE — POSITIONER ARMBOARD FOAM CONVOLUTE CP-501

## (undated) DEVICE — LIFTER SURGICAL ASCENDO SUBMUCOSAL LIFT AGENT BX00712934

## (undated) DEVICE — PREP CHLORAPREP 26ML TINTED HI-LITE ORANGE 930815

## (undated) DEVICE — GOWN IMPERVIOUS SPECIALTY XLG/XLONG 32474

## (undated) DEVICE — TAPE DURAPORE 3" SILK 1538-3

## (undated) DEVICE — TOOL DISSECT MIDAS MR8 14CM MATCH HEAD 3MM MR8-14MH30

## (undated) DEVICE — PREP CHLORAPREP 26ML TINTED ORANGE  260815

## (undated) DEVICE — SOLUTION IRRIGATION 0.9% NACL 1000ML BOTTLE R5200-01

## (undated) RX ORDER — FENTANYL CITRATE-0.9 % NACL/PF 10 MCG/ML
PLASTIC BAG, INJECTION (ML) INTRAVENOUS
Status: DISPENSED
Start: 2025-07-25

## (undated) RX ORDER — ACETAMINOPHEN 325 MG/1
TABLET ORAL
Status: DISPENSED
Start: 2025-07-25

## (undated) RX ORDER — FENTANYL CITRATE 50 UG/ML
INJECTION, SOLUTION INTRAMUSCULAR; INTRAVENOUS
Status: DISPENSED
Start: 2025-05-01

## (undated) RX ORDER — ONDANSETRON 2 MG/ML
INJECTION INTRAMUSCULAR; INTRAVENOUS
Status: DISPENSED
Start: 2025-07-25

## (undated) RX ORDER — CEFAZOLIN SODIUM/WATER 2 G/20 ML
SYRINGE (ML) INTRAVENOUS
Status: DISPENSED
Start: 2025-07-25

## (undated) RX ORDER — HYDROMORPHONE HYDROCHLORIDE 1 MG/ML
INJECTION, SOLUTION INTRAMUSCULAR; INTRAVENOUS; SUBCUTANEOUS
Status: DISPENSED
Start: 2025-05-01

## (undated) RX ORDER — FENTANYL CITRATE 50 UG/ML
INJECTION, SOLUTION INTRAMUSCULAR; INTRAVENOUS
Status: DISPENSED
Start: 2025-02-12

## (undated) RX ORDER — BUPIVACAINE HYDROCHLORIDE 5 MG/ML
INJECTION, SOLUTION EPIDURAL; INTRACAUDAL; PERINEURAL
Status: DISPENSED
Start: 2025-05-01

## (undated) RX ORDER — DEXMEDETOMIDINE HYDROCHLORIDE 4 UG/ML
INJECTION, SOLUTION INTRAVENOUS
Status: DISPENSED
Start: 2025-05-01

## (undated) RX ORDER — SIMETHICONE 40MG/0.6ML
SUSPENSION, DROPS(FINAL DOSAGE FORM)(ML) ORAL
Status: DISPENSED
Start: 2025-02-12

## (undated) RX ORDER — ACETAMINOPHEN 325 MG/1
TABLET ORAL
Status: DISPENSED
Start: 2025-05-01

## (undated) RX ORDER — PROPOFOL 10 MG/ML
INJECTION, EMULSION INTRAVENOUS
Status: DISPENSED
Start: 2025-05-01

## (undated) RX ORDER — BUPIVACAINE HYDROCHLORIDE 2.5 MG/ML
INJECTION, SOLUTION EPIDURAL; INFILTRATION; INTRACAUDAL; PERINEURAL
Status: DISPENSED
Start: 2025-05-01

## (undated) RX ORDER — GABAPENTIN 300 MG/1
CAPSULE ORAL
Status: DISPENSED
Start: 2025-07-25

## (undated) RX ORDER — FENTANYL CITRATE 50 UG/ML
INJECTION, SOLUTION INTRAMUSCULAR; INTRAVENOUS
Status: DISPENSED
Start: 2025-07-25

## (undated) RX ORDER — CEFAZOLIN SODIUM 2 G/50ML
SOLUTION INTRAVENOUS
Status: DISPENSED
Start: 2025-05-01

## (undated) RX ORDER — FENTANYL CITRATE-0.9 % NACL/PF 10 MCG/ML
PLASTIC BAG, INJECTION (ML) INTRAVENOUS
Status: DISPENSED
Start: 2025-05-01

## (undated) RX ORDER — PROPOFOL 10 MG/ML
INJECTION, EMULSION INTRAVENOUS
Status: DISPENSED
Start: 2025-07-25

## (undated) RX ORDER — KETOROLAC TROMETHAMINE 30 MG/ML
INJECTION, SOLUTION INTRAMUSCULAR; INTRAVENOUS
Status: DISPENSED
Start: 2025-07-25

## (undated) RX ORDER — DEXAMETHASONE SODIUM PHOSPHATE 4 MG/ML
INJECTION, SOLUTION INTRA-ARTICULAR; INTRALESIONAL; INTRAMUSCULAR; INTRAVENOUS; SOFT TISSUE
Status: DISPENSED
Start: 2025-07-25

## (undated) RX ORDER — HYDROMORPHONE HYDROCHLORIDE 1 MG/ML
INJECTION, SOLUTION INTRAMUSCULAR; INTRAVENOUS; SUBCUTANEOUS
Status: DISPENSED
Start: 2025-07-25